# Patient Record
Sex: FEMALE | Race: BLACK OR AFRICAN AMERICAN | Employment: FULL TIME | URBAN - METROPOLITAN AREA
[De-identification: names, ages, dates, MRNs, and addresses within clinical notes are randomized per-mention and may not be internally consistent; named-entity substitution may affect disease eponyms.]

---

## 2017-03-02 DIAGNOSIS — I10 ESSENTIAL HYPERTENSION, BENIGN: ICD-10-CM

## 2017-03-03 RX ORDER — CHLORTHALIDONE 25 MG/1
TABLET ORAL
Qty: 30 TAB | Refills: 0 | Status: SHIPPED | OUTPATIENT
Start: 2017-03-03 | End: 2017-07-06 | Stop reason: SINTOL

## 2017-03-08 DIAGNOSIS — I10 ESSENTIAL HYPERTENSION, BENIGN: ICD-10-CM

## 2017-03-09 RX ORDER — AMLODIPINE BESYLATE 5 MG/1
5 TABLET ORAL DAILY
Qty: 30 TAB | Refills: 0 | Status: SHIPPED | OUTPATIENT
Start: 2017-03-09 | End: 2017-03-22 | Stop reason: DRUGHIGH

## 2017-03-16 LAB — MAMMOGRAPHY, EXTERNAL: NORMAL

## 2017-03-22 ENCOUNTER — OFFICE VISIT (OUTPATIENT)
Dept: FAMILY MEDICINE CLINIC | Age: 42
End: 2017-03-22

## 2017-03-22 VITALS
SYSTOLIC BLOOD PRESSURE: 140 MMHG | HEIGHT: 62 IN | OXYGEN SATURATION: 98 % | TEMPERATURE: 98.6 F | DIASTOLIC BLOOD PRESSURE: 84 MMHG | BODY MASS INDEX: 40.03 KG/M2 | HEART RATE: 98 BPM | RESPIRATION RATE: 20 BRPM | WEIGHT: 217.5 LBS

## 2017-03-22 DIAGNOSIS — G47.09 OTHER INSOMNIA: ICD-10-CM

## 2017-03-22 DIAGNOSIS — J45.20 RAD (REACTIVE AIRWAY DISEASE) WITH WHEEZING, MILD INTERMITTENT, UNCOMPLICATED: ICD-10-CM

## 2017-03-22 DIAGNOSIS — D75.839 THROMBOCYTOSIS: ICD-10-CM

## 2017-03-22 DIAGNOSIS — J20.8 ACUTE BRONCHITIS DUE TO OTHER SPECIFIED ORGANISMS: ICD-10-CM

## 2017-03-22 DIAGNOSIS — I10 ESSENTIAL HYPERTENSION, BENIGN: Primary | ICD-10-CM

## 2017-03-22 DIAGNOSIS — E66.9 OBESITY, CLASS II, BMI 35-39.9: ICD-10-CM

## 2017-03-22 DIAGNOSIS — R05.9 COUGH: ICD-10-CM

## 2017-03-22 DIAGNOSIS — E78.00 PURE HYPERCHOLESTEROLEMIA: ICD-10-CM

## 2017-03-22 DIAGNOSIS — E55.9 VITAMIN D DEFICIENCY: ICD-10-CM

## 2017-03-22 DIAGNOSIS — J30.89 SEASONAL ALLERGIC RHINITIS DUE TO OTHER ALLERGIC TRIGGER: ICD-10-CM

## 2017-03-22 DIAGNOSIS — D72.828 OTHER ELEVATED WHITE BLOOD CELL (WBC) COUNT: ICD-10-CM

## 2017-03-22 DIAGNOSIS — N92.0 MENORRHAGIA WITH REGULAR CYCLE: ICD-10-CM

## 2017-03-22 DIAGNOSIS — D50.8 OTHER IRON DEFICIENCY ANEMIA: ICD-10-CM

## 2017-03-22 PROBLEM — D72.829 LEUKOCYTOSIS: Status: ACTIVE | Noted: 2017-03-22

## 2017-03-22 PROBLEM — J30.2 SEASONAL ALLERGIC RHINITIS: Status: ACTIVE | Noted: 2017-03-22

## 2017-03-22 LAB
QUICKVUE INFLUENZA TEST: NEGATIVE
VALID INTERNAL CONTROL?: YES

## 2017-03-22 RX ORDER — ALBUTEROL SULFATE 90 UG/1
AEROSOL, METERED RESPIRATORY (INHALATION)
Refills: 0 | COMMUNITY
Start: 2016-12-31 | End: 2017-03-22 | Stop reason: SDUPTHER

## 2017-03-22 RX ORDER — MONTELUKAST SODIUM 10 MG/1
10 TABLET ORAL DAILY
Qty: 30 TAB | Refills: 5 | Status: SHIPPED | OUTPATIENT
Start: 2017-03-22 | End: 2017-07-17

## 2017-03-22 RX ORDER — HYDROCODONE POLISTIREX AND CHLORPHENIRAMINE POLISTIREX 10; 8 MG/5ML; MG/5ML
5 SUSPENSION, EXTENDED RELEASE ORAL
Qty: 200 ML | Refills: 0 | Status: SHIPPED | OUTPATIENT
Start: 2017-03-22 | End: 2017-07-06 | Stop reason: ALTCHOICE

## 2017-03-22 RX ORDER — ALBUTEROL SULFATE 90 UG/1
2 AEROSOL, METERED RESPIRATORY (INHALATION)
Qty: 1 INHALER | Refills: 1 | Status: SHIPPED | OUTPATIENT
Start: 2017-03-22 | End: 2018-04-04 | Stop reason: SDUPTHER

## 2017-03-22 RX ORDER — AMLODIPINE BESYLATE 10 MG/1
10 TABLET ORAL DAILY
Qty: 30 TAB | Refills: 5 | Status: SHIPPED | OUTPATIENT
Start: 2017-03-22 | End: 2018-07-23

## 2017-03-22 RX ORDER — LISINOPRIL 40 MG/1
40 TABLET ORAL DAILY
Qty: 30 TAB | Refills: 11 | Status: SHIPPED | OUTPATIENT
Start: 2017-03-22 | End: 2018-04-04 | Stop reason: SDUPTHER

## 2017-03-22 NOTE — PROGRESS NOTES
HISTORY OF PRESENT ILLNESS  Shannon Aldana is a 35 y.o. female presents with Blood Pressure Check; ED Follow-up; Cough; and Medication Refill    Agree with nurse note. I have not seen  since 3/16/16. At that time, she was sent to the ER from our office due to chest pain. No recurrence. Pt reports going to [facility] ER in 12/2016. Dx'd with bronchitis. Rx'd antibiotic, cough syrup, and ProAir HFA. Pt with allergies. She mentions she had flu-like sxs last week. Today, rapid influenza is negative. She has episodes of coughing that causes her not to be able to breathe. Her chest becomes tight and she wheezes with more episodes occurring at night. She uses ProAir during these episodes with relief. Hypertensive pt presents to the office with an elevated BP of 154/94. For BP, she takes Norvasc 5 mg daily and Lisinopril 40 mg daily, tolerating well. She is not taking Chlorthalidone 25 mg. Patient denies vision changes, headaches, dizziness, chest pain, SOB, or swelling. She weighs 217 lbs, gained 4 lbs since last ov. Pt is followed by optometrist, Dr. Tigre Pruitt. Pt with Vit D deficiency, hypercholesterolemia, iron deficiency anemia, elevated WBC, and thrombocytosis. She takes Ferrous Sulfate 250 mg daily. In 03/2016, WBC was 11.1. Hgb was 9.2. Hct was 30.9. Pt with hx of menorrhagia; resolved after D&C in 2015. Health Maintenance    Pt reports her last pap smear was last year with GYN, Dr. Shant Singh. She reports having a mammogram on 3/17/17 at the [facility] with normal results. ROS    Review of Systems negative except as noted above in HPI.     ALLERGIES:    Allergies   Allergen Reactions    Bactrim [Sulfamethoxazole-Trimethoprim] Other (comments)     GI Upset       CURRENT MEDICATIONS:    Outpatient Prescriptions Marked as Taking for the 3/22/17 encounter (Office Visit) with Maria Alejandra Herrmann, DO   Medication Sig Dispense Refill    Ferrous Sulfate 250 mg (50 mg iron) TbER Take  by mouth.  PROAIR HFA 90 mcg/actuation inhaler Take 2 Puffs by inhalation every four (4) hours as needed for Wheezing. Indications: BRONCHOSPASM PREVENTION 1 Inhaler 1    lisinopril (PRINIVIL, ZESTRIL) 40 mg tablet Take 1 Tab by mouth daily. Indications: hypertension 30 Tab 11    amLODIPine (NORVASC) 10 mg tablet Take 1 Tab by mouth daily. Indications: hypertension 30 Tab 5    montelukast (SINGULAIR) 10 mg tablet Take 1 Tab by mouth daily. Indications: ALLERGIC RHINITIS 30 Tab 5    chlorpheniramine-HYDROcodone (TUSSIONEX) 10-8 mg/5 mL suspension Take 5 mL by mouth every twelve (12) hours as needed for Cough. Max Daily Amount: 10 mL. Indications: COUGH 200 mL 0       PAST MEDICAL HISTORY:    Past Medical History:   Diagnosis Date    Allergy, unspecified not elsewhere classified     Anemia     Anemia     Ankle sprain 03/2009    bilateral.  Dr. Linh Coleman Breast mass 5/5/09    Left. benign.  Chickenpox childhood    Ectopic pregnancy 04/2010    Right. Dr. Radha Fernandez hypertension, benign     Menorrhagia 08/2015    Dr. Suma Rod Ovarian cyst 2011    R. Dr. Villagomez  Pure hypercholesterolemia 05/2009    Thrombocytosis ([address] 05/2009    Tubal pregnancy 06/2011    Dr Sarah Sanchez     Vitamin D deficiency 05/2009       PAST SURGICAL HISTORY:    Past Surgical History:   Procedure Laterality Date    HX DILATION AND CURETTAGE  8/11/15    Dr. Ambreen Zavala. due to heavy vag bleeding and thick endometrial stripe.  HX SALPINGO-OOPHORECTOMY Right 04/2010    Right. due to ectopic pregnancy. Dr. Sarah Sanchez.     HX TUBAL LIGATION  2002    Dr. Eunice Archibald HISTORY:    Family History   Problem Relation Age of Onset    Diabetes Mother      type ll    Hypertension Mother     Diabetes Father      type ll       SOCIAL HISTORY:    Social History     Social History    Marital status:      Spouse name: N/A   Zia [place] Number of children: N/A    Years of education: N/A     Social History Main Topics    Smoking status: Never Smoker    Smokeless tobacco: None    Alcohol use No    Drug use: No    Sexual activity: Yes     Partners: Male     Birth control/ protection: None     Other Topics Concern    None     Social History Narrative       IMMUNIZATIONS:    There is no immunization history on file for this patient. PHYSICAL EXAMINATION    Vital Signs    Visit Vitals    BP (!) 154/94 (BP 1 Location: Right arm, BP Patient Position: Sitting)    Pulse 98    Temp 98.6 °F (37 °C) (Oral)    Resp 20    Ht 5' 2\" (1.575 m)    Wt 217 lb 8 oz (98.7 kg)    LMP 03/06/2017 (Approximate)    SpO2 98%    BMI 39.78 kg/m2       Weight Metrics 3/22/2017 3/16/2016 3/16/2016 9/9/2015 8/26/2015 7/21/2015 6/25/2015   Weight 217 lb 8 oz 213 lb 3 oz 213 lb 6.4 oz 180 lb 202 lb 9.6 oz 201 lb 11.2 oz 203 lb   BMI 39.78 kg/m2 38.98 kg/m2 39.02 kg/m2 32.91 kg/m2 37.05 kg/m2 36.88 kg/m2 37.12 kg/m2       General appearance - Well nourished. Well appearing. Well developed. No acute distress. Overweight. Head - Normocephalic. Atraumatic. Eyes - pupils equal and reactive. Extraocular eye movements intact. Sclera anicteric. Mildly injected sclera. Ears - Hearing is grossly normal bilaterally. Nose - normal and patent. No polyps noted. No erythema. No discharge. Mouth - mucous membranes with adequate moisture. Posterior pharynx normal with cobblestone appearance. No erythema, white exudate or obstruction. Neck - supple. Midline trachea. No carotid bruits noted bilaterally. No thyromegaly noted. Chest - clear to auscultation bilaterally anteriorly and posteriorly. No wheezes. No rales or rhonchi. Breath sounds are symmetrical bilaterally. Unlabored respirations. Heart - normal rate. Regular rhythm. Normal S1, S2. No murmur noted. No rubs, clicks or gallops noted. Abdomen - soft and distended.   No masses or organomegaly. No rebound, rigidity or guarding. Bowel sounds normal x 4 quadrants. No tenderness noted. Neurological - awake, alert and oriented to person, place, and time and event. Cranial nerves II through XII intact. Clear speech. Muscle strength is +5/5 x 4 extremities. Sensation is intact to light touch bilaterally. Steady gait. Heme/Lymph - peripheral pulses normal x 4 extremities. No peripheral edema is noted. Musculoskeletal - Intact x 4 extremities. Full ROM x 4 extremities. No pain with movement. Back exam - normal range of motion. No pain on palpation of the spinous processes in the cervical, thoracic, lumbar, sacral regions. No CVA tenderness. Skin - no rashes, erythema, ecchymosis, lacerations, abrasions, suspicious moles noted  Psychological -   normal behavior, dress and thought processes. Good insight. Good eye contact. Normal affect. Appropriate mood. Normal speech. DATA REVIEWED    Results for orders placed or performed in visit on 03/22/17   AMB POC RAPID INFLUENZA TEST   Result Value Ref Range    VALID INTERNAL CONTROL POC Yes     QuickVue Influenza test Negative Negative     Lab Results   Component Value Date/Time    WBC 11.1 03/16/2016 11:38 AM    HGB 9.2 03/16/2016 11:38 AM    HCT 30.9 03/16/2016 11:38 AM    PLATELET 777 55/43/0762 11:38 AM    MCV 71.7 03/16/2016 11:38 AM     Lab Results   Component Value Date/Time    Sodium 140 03/16/2016 11:38 AM    Potassium 3.6 03/16/2016 11:38 AM    Chloride 106 03/16/2016 11:38 AM    CO2 28 03/16/2016 11:38 AM    Anion gap 6 03/16/2016 11:38 AM    Glucose 104 03/16/2016 11:38 AM    BUN 9 03/16/2016 11:38 AM    Creatinine 0.84 03/16/2016 11:38 AM    BUN/Creatinine ratio 11 03/16/2016 11:38 AM    GFR est AA >60 03/16/2016 11:38 AM    GFR est non-AA >60 03/16/2016 11:38 AM    Calcium 8.8 03/16/2016 11:38 AM    Bilirubin, total 0.2 03/16/2016 11:38 AM    AST (SGOT) 10 03/16/2016 11:38 AM    Alk.  phosphatase 101 03/16/2016 11:38 AM    Protein, total 8.2 03/16/2016 11:38 AM    Albumin 3.2 03/16/2016 11:38 AM    Globulin 5.0 03/16/2016 11:38 AM    A-G Ratio 0.6 03/16/2016 11:38 AM    ALT (SGPT) 22 03/16/2016 11:38 AM     Lab Results   Component Value Date/Time    Cholesterol, total 195 08/27/2015 08:56 AM    HDL Cholesterol 76 08/27/2015 08:56 AM    LDL, calculated 106 08/27/2015 08:56 AM    VLDL, calculated 13 08/27/2015 08:56 AM    Triglyceride 66 08/27/2015 08:56 AM     Lab Results   Component Value Date/Time    VITAMIN D, 25-HYDROXY 12.0 08/27/2015 08:56 AM       Lab Results   Component Value Date/Time    Hemoglobin A1c 5.6 08/27/2015 08:56 AM     Lab Results   Component Value Date/Time    TSH 1.290 08/27/2015 08:56 AM       ASSESSMENT and PLAN      ICD-10-CM ICD-9-CM    1. Essential hypertension, benign I10 401.1 lisinopril (PRINIVIL, ZESTRIL) 40 mg tablet      amLODIPine (NORVASC) 10 mg tablet      LIPID PANEL      METABOLIC PANEL, COMPREHENSIVE      TSH 3RD GENERATION      URINALYSIS W/ RFLX MICROSCOPIC      MICROALBUMIN, UR, RAND W/ MICROALBUMIN/CREA RATIO    uncontrolled   2. Seasonal allergic rhinitis due to other allergic trigger J30.89  montelukast (SINGULAIR) 10 mg tablet   3. RAD (reactive airway disease) with wheezing, mild intermittent, uncomplicated J69.80 959.48 PROAIR HFA 90 mcg/actuation inhaler   4. Vitamin D deficiency E55.9 268.9 VITAMIN D, 25 HYDROXY   5. Cough R05 786.2 AMB POC RAPID INFLUENZA TEST      REFERRAL TO ALLERGY      CBC WITH AUTOMATED DIFF   6. Other iron deficiency anemia D50.8  Ferrous Sulfate 250 mg (50 mg iron) TbER   7. Acute bronchitis due to other specified organisms J20.8 466.0     resolved after antibiotic   8. Pure hypercholesterolemia E78.00 272.0 LIPID PANEL      METABOLIC PANEL, COMPREHENSIVE   9. Menorrhagia with regular cycle N92.0 626.2     resolved after D and C   10. Other elevated white blood cell (WBC) count D72.828  CBC WITH AUTOMATED DIFF   11.  Thrombocytosis ([address] D47.3 238.71 CBC WITH AUTOMATED DIFF   12. Obesity, Class II, BMI 35-39.9 ([facility]) E66.01 278.01    13. Other insomnia G47.09 780.52 chlorpheniramine-HYDROcodone (TUSSIONEX) 10-8 mg/5 mL suspension    due to cough         Discussed the patient's BMI with her. The BMI follow up plan is as follows: I have counseled this patient on diet and exercise regimens. Addressed weight, diet and exercise with patient. Decrease carbohydrates (white foods, sweet foods, sweet drinks and alcohol), increase green leafy vegetables and protein (lean meats and beans) with each meal.  Avoid fried foods. Eat 3-5 small meals daily. Do not skip meals. Increase water intake. Increase physical activity to 30 minutes daily for health benefit or 60 minutes daily to prevent weight regain, as tolerated. Get 7-8 hours uninterrupted sleep nightly. Chart reviewed and updated. Continue current medications and care. Increase Norvasc from 5 mg to 10 mg daily. Continue with Lisinopril 40 mg daily; will not restart Chlorthalidone. Start Singulair 10 mg daily for allergies and cough  Prescriptions written and sent to pharmacy; medication side effects discussed. Norvasc 10 mg. Lisinopril 40 mg. Singulair 10 mg. ProAir HFA 90 mcg. Tussionex 10-8 mg/5 mL suspension. Most recent tests reviewed from 03/2016. Recheck pertinent labs when fasting. Get recent office visit notes from [facility], Dr. Jacob Easley, Dr. Marely Hutchins, and [facility]. Advised pt to sign release. Referrals given; patient urged to keep appointments with specialists. Allergist.   Counseled patient on health concerns: Allergies, cough, RAD, and BP. Relevant handouts given and discussed with patient. Immunizations noted. Offered empathy, support, legitimation, prayers, partnership to patient. Praised patient for progress. Advance Care Booklet given at office visit. Discussed with pt today. Referred for honoring choices.  Staff message sent to honoring choices team. Follow-up Disposition:  Return in about 3 months (around 6/22/2017) for BP, RESULTS. Patient was offered a choice/choices in the treatment plan today. Patient expresses understanding of the plan and agrees with recommendations. Written by gilda Irwin, as dictated by Dr. Deshaun Peterson DO. Documentation True and Accepted by Vickie Sparks. Ella Coe. Patient Instructions        DASH Diet: Care Instructions  Your Care Instructions  The DASH diet is an eating plan that can help lower your blood pressure. DASH stands for Dietary Approaches to Stop Hypertension. Hypertension is high blood pressure. The DASH diet focuses on eating foods that are high in calcium, potassium, and magnesium. These nutrients can lower blood pressure. The foods that are highest in these nutrients are fruits, vegetables, low-fat dairy products, nuts, seeds, and legumes. But taking calcium, potassium, and magnesium supplements instead of eating foods that are high in those nutrients does not have the same effect. The DASH diet also includes whole grains, fish, and poultry. The DASH diet is one of several lifestyle changes your doctor may recommend to lower your high blood pressure. Your doctor may also want you to decrease the amount of sodium in your diet. Lowering sodium while following the DASH diet can lower blood pressure even further than just the DASH diet alone. Follow-up care is a key part of your treatment and safety. Be sure to make and go to all appointments, and call your doctor if you are having problems. It's also a good idea to know your test results and keep a list of the medicines you take. How can you care for yourself at home? Following the DASH diet  · Eat 4 to 5 servings of fruit each day. A serving is 1 medium-sized piece of fruit, ½ cup chopped or canned fruit, 1/4 cup dried fruit, or 4 ounces (½ cup) of fruit juice. Choose fruit more often than fruit juice.   · Eat 4 to 5 servings of vegetables each day. A serving is 1 cup of lettuce or raw leafy vegetables, ½ cup of chopped or cooked vegetables, or 4 ounces (½ cup) of vegetable juice. Choose vegetables more often than vegetable juice. · Get 2 to 3 servings of low-fat and fat-free dairy each day. A serving is 8 ounces of milk, 1 cup of yogurt, or 1 ½ ounces of cheese. · Eat 6 to 8 servings of grains each day. A serving is 1 slice of bread, 1 ounce of dry cereal, or ½ cup of cooked rice, pasta, or cooked cereal. Try to choose whole-grain products as much as possible. · Limit lean meat, poultry, and fish to 2 servings each day. A serving is 3 ounces, about the size of a deck of cards. · Eat 4 to 5 servings of nuts, seeds, and legumes (cooked dried beans, lentils, and split peas) each week. A serving is 1/3 cup of nuts, 2 tablespoons of seeds, or ½ cup of cooked beans or peas. · Limit fats and oils to 2 to 3 servings each day. A serving is 1 teaspoon of vegetable oil or 2 tablespoons of salad dressing. · Limit sweets and added sugars to 5 servings or less a week. A serving is 1 tablespoon jelly or jam, ½ cup sorbet, or 1 cup of lemonade. · Eat less than 2,300 milligrams (mg) of sodium a day. If you limit your sodium to 1,500 mg a day, you can lower your blood pressure even more. Tips for success  · Start small. Do not try to make dramatic changes to your diet all at once. You might feel that you are missing out on your favorite foods and then be more likely to not follow the plan. Make small changes, and stick with them. Once those changes become habit, add a few more changes. · Try some of the following:  ¨ Make it a goal to eat a fruit or vegetable at every meal and at snacks. This will make it easy to get the recommended amount of fruits and vegetables each day. ¨ Try yogurt topped with fruit and nuts for a snack or healthy dessert. ¨ Add lettuce, tomato, cucumber, and onion to sandwiches.   ¨ Combine a ready-made pizza crust with low-fat mozzarella cheese and lots of vegetable toppings. Try using tomatoes, squash, spinach, broccoli, carrots, cauliflower, and onions. ¨ Have a variety of cut-up vegetables with a low-fat dip as an appetizer instead of chips and dip. ¨ Sprinkle sunflower seeds or chopped almonds over salads. Or try adding chopped walnuts or almonds to cooked vegetables. ¨ Try some vegetarian meals using beans and peas. Add garbanzo or kidney beans to salads. Make burritos and tacos with mashed glynn beans or black beans. Where can you learn more? Go to http://tlCogent [employer] Groupaguilar.info/. Enter C965 in the search box to learn more about \"DASH Diet: Care Instructions. \"  Current as of: March 23, 2016  Content Version: 11.1  © 5239-5876 [employer]. Care instructions adapted under license by [employer] (which disclaims liability or warranty for this information). If you have questions about a medical condition or this instruction, always ask your healthcare professional. [place] [address] any warranty or liability for your use of this information. Check BP twice weekly. Notify me if it consistently is above 140/90 or below 90/60. Bring your blood pressure log to your next office visit. Decrease salt, fats, caffeine, alcohol in your diet. Increase water, fiber. Exercise 5 times weekly for 30 minutes daily as tolerated. Continue current medications, care and subspecialty follow up. Visit eye doctor yearly to check your eyes blood pressure related changes. WEIGHT LOSS PLAN    1. Read food labels and count calories. Goal 7865-2019 calories daily for women and 7770-2426 calories daily for men. Achieve this by decreasing caloric intake by 500 calories by weekly increments. NOTE:  1 pound = 3500 calories! Www.loseit. com  Www.myOncodesignnesspal.com  Www.[employer]. ZummZumm  Www.healthfinder. gov  Weight watchers mobile    Calories needed to lose 1-2 pounds a week = 10 x your weight in pounds    2. Increase water intake. Per [facility] Weight loss Program, it is important to drink 1/2 your body weight in ounces of water daily. Decrease your water consumption 2-3 hours before bedtime to prevent sleep disturbance from frequent urination. 3.  Decrease sugary beverages. Each can or glass of soda increases your risk of obesity by 60%. Can lose 10 pounds in a month by avoiding any soda. 12 oz can of soda = 140 calories, 16 oz cup of sweet tea = 200 calories    16 oz orange juice = 200 calories, 10 oz apple juice = 150 calories   32 oz sports drink = 200 calories, 16 oz punch = 240 calories   3.5 oz alcohol = 100-150 calories     4. Avoid High Fructose Corn Syrup products. This ingredient makes products highly addictive! 5. Exercise 30 minutes daily 5 days weekly, minimally. If you burn 3500 calories that equals a pound! Use a pedometer to count steps. Visit www. [employer] for a free pedometer and diet recommendations. Your maximum heart rate = 220 - your age    Never exercise at your maximum heart rate. See handout for target heart rate. 6.  Decrease carbohydrates (white bread, pasta, rice, potatoes, sweet foods and sweet drinks like soda, tea, coffee, juice and sports drinks). Increase fiber and protein. Goal:   calories daily of carbohydrates     Try CHROMIUM PICONATE 200 MG THREE TIMES DAILY,    to decrease Premenstrual carbohydrate cravings. 7.  Eat 3-5 small meals daily, include lots of protein (beans/legumes, nuts, lean meat, eggs) and green vegetables with each. (Breakfast, lunch, dinner with 2 healthy snacks)    8. Get proper rest 7-8 hours uninterrupted. When you get less than 6 hours, it triggers hunger by affecting your [place]:Leptin ratio and this results in weight gain. 9.  Watch your food portions.   Green leafy vegetables should cover 1/2 of the plate, lean meat 1/4 of the plate, starchy vegetable 1/4 of the plate.  Use smaller plates. 10.  Do not eat until you are full. Eat until you are no longer hungry. If you are not sure, try drinking a glass of water before getting your second serving of food. 11.  Do not weigh yourself daily. Wait until your next office visit. Use how you feel and  how your clothes fit as measurements of success. 12.  Address your spirituality to draw strength from above during your journey. Remember \"I am fearfully and wonderfully made. Marvelous in His eyes. \"    13. Set realistic, appropriate and achievable weight loss goals:     RECOMMENDED TARGET WEIGHT LOSS:  Initial weight loss of 5-10% of your initial body weight achieved over 6 months or a decrease of 2 BMI units. MINIMUM GOAL OF WEIGHT LOSS:  Reduce body weight and maintain a lower body weight. Prevent weight gain. RELATED WEBSITES:      Www.obesityaction. org  (and consider joining the Obesity Action Coalition for $25/year. The [facility] mission is to elevated and empower those affected by obesity through education, advocacy and support. Quarterly journals included in membership fee. )    Www.Docstoc  www.GetAFive.com     RELATED DIETS:  Dr. Jamie Vazquez Weight loss challenge, Mediterranean diet, [address] [place] [address] Watchers, Paleo Diet (anti-inflammatory diet)        EXERCISE 150 MINUTES WEEKLY. THIS CAN BE ACHIEVED BY WORKING OUT OR WALKING A MINIMUM OF 30 MINUTES FOR 5 DAYS WEEKLY. YOU CAN EXERCISE IN INCREMENTS OF 10-15 MINUTES UP TO 3 TIMES A DAY. Consider performing \"brainless exercise. \"  Choose your favorite tv program.  Five minutes before and for 5 minutes after the tv program, stretch your body. While the program is on, walk in place watching the show. When commercials come on, rest or walk around the house to do other things. When the program begins, return to walking in place.   When you are able keep walking during the commercials and add light weights to your ankles or hands. By the end of the show, you would have walked 30 minutes. If you need shorter spurts of exercise, walk during the commercials and rest during the show. Drink to glasses of water prior to any exercise to prevent dehydration and to improve the results of the work out. Your RESTING HEART RATE is the number of times your heart beats per minute when you are not exerting yourself. The more fit you are, the lower your resting heart rate will be. Your MAXIMUM HEART RATE is the number of times per minute your heart pumps when it is working at 100% capacity. NEVER EXERCISE AT YOUR MAXIMUM HEART RATE. MAX HEART RATE = 220 - your age    For example, in a 48year old, the maximum heart rate is 220-50 = 170 beats per minute    Your [place] is the number of beats per minute our heart should pump during aerobic exercise. Reaching your target heart rate indicates that your body is receiving maximum cardiovascular and fat burning benefits. If you are fit, your TARGET HEART RATE = 70-85% of your maximum Heart rate      For a 48year old with vigorous intensity physical activity,         Target heart rate= 170 bpm x .70 = 119 bpm     Target heart rate = 170 bpm x .85=  145 bpm        Therefore, your target heart rate during physical activity is 119-145      If you are not fit, TARGET HEART RATE = 50-70% of your maximum Heart rate    MODERATE CONSISTENT AEROBIC EXERCISE OR WALKING FOR AT LEAST 150 MINUTES WEEKLY IS AN ESSENTIAL PART OF ANY WEIGHT LOSS OF WEIGHT MAINTENANCE PROGRAM.     During WEIGHT LOSS PHASE, at least 75% of your exercise needs to be walking or moderate aerobic activity and 25% or 0% can be Isometric or Resistance type exercises (the latter can be deferred to the weight maintenance phase.)     During WEIGHT MAINTENANCE PHASE, at least 50% of your exercise needs to be aerobic and 50% Isometric or Resistance type exercises.      BENEFITS OF MODERATE INTENSITY EXERCISE MOST DAYS OF THE WEEK:     1. Insulin Resistance improves 30-85%   2. Abdominal Fat decreases by 30%   3. Inflammatory Markers decrease by 30% (therefore pain decreases)   4. Systolic and Diastolic Blood Pressure decrease by 4 mmHg   5. HDL improves by 5%   6. Triglycerides decrease by 15%   7. Shift from small dense LDL to large dense LDL (therefore decrease Insulin Resistance)   8. Decrease coagulability                  DASH Diet: Care Instructions  Your Care Instructions  The DASH diet is an eating plan that can help lower your blood pressure. DASH stands for Dietary Approaches to Stop Hypertension. Hypertension is high blood pressure. The DASH diet focuses on eating foods that are high in calcium, potassium, and magnesium. These nutrients can lower blood pressure. The foods that are highest in these nutrients are fruits, vegetables, low-fat dairy products, nuts, seeds, and legumes. But taking calcium, potassium, and magnesium supplements instead of eating foods that are high in those nutrients does not have the same effect. The DASH diet also includes whole grains, fish, and poultry. The DASH diet is one of several lifestyle changes your doctor may recommend to lower your high blood pressure. Your doctor may also want you to decrease the amount of sodium in your diet. Lowering sodium while following the DASH diet can lower blood pressure even further than just the DASH diet alone. Follow-up care is a key part of your treatment and safety. Be sure to make and go to all appointments, and call your doctor if you are having problems. It's also a good idea to know your test results and keep a list of the medicines you take. How can you care for yourself at home? Following the DASH diet  · Eat 4 to 5 servings of fruit each day. A serving is 1 medium-sized piece of fruit, ½ cup chopped or canned fruit, 1/4 cup dried fruit, or 4 ounces (½ cup) of fruit juice.  Choose fruit more often than fruit juice.  · Eat 4 to 5 servings of vegetables each day. A serving is 1 cup of lettuce or raw leafy vegetables, ½ cup of chopped or cooked vegetables, or 4 ounces (½ cup) of vegetable juice. Choose vegetables more often than vegetable juice. · Get 2 to 3 servings of low-fat and fat-free dairy each day. A serving is 8 ounces of milk, 1 cup of yogurt, or 1 ½ ounces of cheese. · Eat 6 to 8 servings of grains each day. A serving is 1 slice of bread, 1 ounce of dry cereal, or ½ cup of cooked rice, pasta, or cooked cereal. Try to choose whole-grain products as much as possible. · Limit lean meat, poultry, and fish to 2 servings each day. A serving is 3 ounces, about the size of a deck of cards. · Eat 4 to 5 servings of nuts, seeds, and legumes (cooked dried beans, lentils, and split peas) each week. A serving is 1/3 cup of nuts, 2 tablespoons of seeds, or ½ cup of cooked beans or peas. · Limit fats and oils to 2 to 3 servings each day. A serving is 1 teaspoon of vegetable oil or 2 tablespoons of salad dressing. · Limit sweets and added sugars to 5 servings or less a week. A serving is 1 tablespoon jelly or jam, ½ cup sorbet, or 1 cup of lemonade. · Eat less than 2,300 milligrams (mg) of sodium a day. If you limit your sodium to 1,500 mg a day, you can lower your blood pressure even more. Tips for success  · Start small. Do not try to make dramatic changes to your diet all at once. You might feel that you are missing out on your favorite foods and then be more likely to not follow the plan. Make small changes, and stick with them. Once those changes become habit, add a few more changes. · Try some of the following:  ¨ Make it a goal to eat a fruit or vegetable at every meal and at snacks. This will make it easy to get the recommended amount of fruits and vegetables each day. ¨ Try yogurt topped with fruit and nuts for a snack or healthy dessert. ¨ Add lettuce, tomato, cucumber, and onion to sandwiches.   ¨ Combine a ready-made pizza crust with low-fat mozzarella cheese and lots of vegetable toppings. Try using tomatoes, squash, spinach, broccoli, carrots, cauliflower, and onions. ¨ Have a variety of cut-up vegetables with a low-fat dip as an appetizer instead of chips and dip. ¨ Sprinkle sunflower seeds or chopped almonds over salads. Or try adding chopped walnuts or almonds to cooked vegetables. ¨ Try some vegetarian meals using beans and peas. Add garbanzo or kidney beans to salads. Make burritos and tacos with mashed glynn beans or black beans. Where can you learn more? Go to http://tl-aguilar.info/. Enter W200 in the search box to learn more about \"DASH Diet: Care Instructions. \"  Current as of: March 23, 2016  Content Version: 11.1  © 1769-3038 [employer], [employer]. Care instructions adapted under license by [employer] (which disclaims liability or warranty for this information). If you have questions about a medical condition or this instruction, always ask your healthcare professional. [address] any warranty or liability for your use of this information.

## 2017-03-22 NOTE — PROGRESS NOTES
Chief Complaint   Patient presents with   [address] Follow Up     [facility] 3- note in encounters; per PT she stayed overnight     ED Follow-up     [facility]; Dx with bronchitis    Cough     still has cough from around ngozi time when she went to [facility]      1. Have you been to the ER, urgent care clinic since your last visit? Hospitalized since your last visit? Yes, went [facility] after last ov with Dr. Namrata Kwok per Dr. Namrata Kwok for HTN and chest pain; went to [facility] for Dx of Bronchitis. 2. Have you seen or consulted any other health care providers outside of the [address] [place] [address] since your last visit? Include any pap smears or colon screening. Yes, PT had mammogram done at [facility]; [facility] ED    In the event something were to happen to you and you were unable to speak on your behalf, do you have an Advance Directive/ Living Will in place stating your wishes? NO    If yes, do we have a copy on file NO    If no, would you like information:    PT offered and declined. PT stated that she was diagnosed with Bronchitis back on 12/31/2016 sp going to [facility] ED; stated that she thinks it's asthma because she is still coughing a lot even after taking all the medications she was given; still uses inhaler she received from visit. PT stated that she has not yet had a pap smear done. PT denies any fever, chills, body aches; stated that she did have a sore throat and an ear infection last week but \"I'm over that now. \"

## 2017-03-22 NOTE — PATIENT INSTRUCTIONS
DASH Diet: Care Instructions  Your Care Instructions  The DASH diet is an eating plan that can help lower your blood pressure. DASH stands for Dietary Approaches to Stop Hypertension. Hypertension is high blood pressure. The DASH diet focuses on eating foods that are high in calcium, potassium, and magnesium. These nutrients can lower blood pressure. The foods that are highest in these nutrients are fruits, vegetables, low-fat dairy products, nuts, seeds, and legumes. But taking calcium, potassium, and magnesium supplements instead of eating foods that are high in those nutrients does not have the same effect. The DASH diet also includes whole grains, fish, and poultry. The DASH diet is one of several lifestyle changes your doctor may recommend to lower your high blood pressure. Your doctor may also want you to decrease the amount of sodium in your diet. Lowering sodium while following the DASH diet can lower blood pressure even further than just the DASH diet alone. Follow-up care is a key part of your treatment and safety. Be sure to make and go to all appointments, and call your doctor if you are having problems. It's also a good idea to know your test results and keep a list of the medicines you take. How can you care for yourself at home? Following the DASH diet  · Eat 4 to 5 servings of fruit each day. A serving is 1 medium-sized piece of fruit, ½ cup chopped or canned fruit, 1/4 cup dried fruit, or 4 ounces (½ cup) of fruit juice. Choose fruit more often than fruit juice. · Eat 4 to 5 servings of vegetables each day. A serving is 1 cup of lettuce or raw leafy vegetables, ½ cup of chopped or cooked vegetables, or 4 ounces (½ cup) of vegetable juice. Choose vegetables more often than vegetable juice. · Get 2 to 3 servings of low-fat and fat-free dairy each day. A serving is 8 ounces of milk, 1 cup of yogurt, or 1 ½ ounces of cheese. · Eat 6 to 8 servings of grains each day.  A serving is 1 slice of bread, 1 ounce of dry cereal, or ½ cup of cooked rice, pasta, or cooked cereal. Try to choose whole-grain products as much as possible. · Limit lean meat, poultry, and fish to 2 servings each day. A serving is 3 ounces, about the size of a deck of cards. · Eat 4 to 5 servings of nuts, seeds, and legumes (cooked dried beans, lentils, and split peas) each week. A serving is 1/3 cup of nuts, 2 tablespoons of seeds, or ½ cup of cooked beans or peas. · Limit fats and oils to 2 to 3 servings each day. A serving is 1 teaspoon of vegetable oil or 2 tablespoons of salad dressing. · Limit sweets and added sugars to 5 servings or less a week. A serving is 1 tablespoon jelly or jam, ½ cup sorbet, or 1 cup of lemonade. · Eat less than 2,300 milligrams (mg) of sodium a day. If you limit your sodium to 1,500 mg a day, you can lower your blood pressure even more. Tips for success  · Start small. Do not try to make dramatic changes to your diet all at once. You might feel that you are missing out on your favorite foods and then be more likely to not follow the plan. Make small changes, and stick with them. Once those changes become habit, add a few more changes. · Try some of the following:  ¨ Make it a goal to eat a fruit or vegetable at every meal and at snacks. This will make it easy to get the recommended amount of fruits and vegetables each day. ¨ Try yogurt topped with fruit and nuts for a snack or healthy dessert. ¨ Add lettuce, tomato, cucumber, and onion to sandwiches. ¨ Combine a ready-made pizza crust with low-fat mozzarella cheese and lots of vegetable toppings. Try using tomatoes, squash, spinach, broccoli, carrots, cauliflower, and onions. ¨ Have a variety of cut-up vegetables with a low-fat dip as an appetizer instead of chips and dip. ¨ Sprinkle sunflower seeds or chopped almonds over salads. Or try adding chopped walnuts or almonds to cooked vegetables. ¨ Try some vegetarian meals using beans and peas. Add garbanzo or kidney beans to salads. Make burritos and tacos with mashed glynn beans or black beans. Where can you learn more? Go to http://tl-aguilar.info/. Enter D837 in the search box to learn more about \"DASH Diet: Care Instructions. \"  Current as of: March 23, 2016  Content Version: 11.1  © 3276-8376 [employer]. Care instructions adapted under license by [employer] (which disclaims liability or warranty for this information). If you have questions about a medical condition or this instruction, always ask your healthcare professional. [place] [address] any warranty or liability for your use of this information. Check BP twice weekly. Notify me if it consistently is above 140/90 or below 90/60. Bring your blood pressure log to your next office visit. Decrease salt, fats, caffeine, alcohol in your diet. Increase water, fiber. Exercise 5 times weekly for 30 minutes daily as tolerated. Continue current medications, care and subspecialty follow up. Visit eye doctor yearly to check your eyes blood pressure related changes. WEIGHT LOSS PLAN    1. Read food labels and count calories. Goal 1572-5184 calories daily for women and 9111-7332 calories daily for men. Achieve this by decreasing caloric intake by 500 calories by weekly increments. NOTE:  1 pound = 3500 calories! Www.loseit. com  Www.myfitnesspal.com  Www.Pellianoople. [employer]xSYS  Www.healthfinder. gov  Weight watchers mobile    Calories needed to lose 1-2 pounds a week = 10 x your weight in pounds    2. Increase water intake. Per [employer] [facility] loss Program, it is important to drink 1/2 your body weight in ounces of water daily. Decrease your water consumption 2-3 hours before bedtime to prevent sleep disturbance from frequent urination. 3.  Decrease sugary beverages. Each can or glass of soda increases your risk of obesity by 60%.   Can lose 10 pounds in a month by avoiding any soda. 12 oz can of soda = 140 calories, 16 oz cup of sweet tea = 200 calories    16 oz orange juice = 200 calories, 10 oz apple juice = 150 calories   32 oz sports drink = 200 calories, 16 oz punch = 240 calories   3.5 oz alcohol = 100-150 calories     4. Avoid High Fructose Corn Syrup products. This ingredient makes products highly addictive! 5. Exercise 30 minutes daily 5 days weekly, minimally. If you burn 3500 calories that equals a pound! Use a pedometer to count steps. Visit www. [employer] for a free pedometer and diet recommendations. Your maximum heart rate = 220 - your age    Never exercise at your maximum heart rate. See handout for target heart rate. 6.  Decrease carbohydrates (white bread, pasta, rice, potatoes, sweet foods and sweet drinks like soda, tea, coffee, juice and sports drinks). Increase fiber and protein. Goal:   calories daily of carbohydrates     Try CHROMIUM PICONATE 200 MG THREE TIMES DAILY,    to decrease Premenstrual carbohydrate cravings. 7.  Eat 3-5 small meals daily, include lots of protein (beans/legumes, nuts, lean meat, eggs) and green vegetables with each. (Breakfast, lunch, dinner with 2 healthy snacks)    8. Get proper rest 7-8 hours uninterrupted. When you get less than 6 hours, it triggers hunger by affecting your [place]hlin:Leptin ratio and this results in weight gain. 9.  Watch your food portions. Green leafy vegetables should cover 1/2 of the plate, lean meat 1/4 of the plate, starchy vegetable 1/4 of the plate. Use smaller plates. 10.  Do not eat until you are full. Eat until you are no longer hungry. If you are not sure, try drinking a glass of water before getting your second serving of food. 11.  Do not weigh yourself daily. Wait until your next office visit. Use how you feel and  how your clothes fit as measurements of success.     12.  Address your spirituality to draw strength from above during your journey. Remember \"I am fearfully and wonderfully made. Marvelous in His eyes. \"    13. Set realistic, appropriate and achievable weight loss goals:     RECOMMENDED TARGET WEIGHT LOSS:  Initial weight loss of 5-10% of your initial body weight achieved over 6 months or a decrease of 2 BMI units. MINIMUM GOAL OF WEIGHT LOSS:  Reduce body weight and maintain a lower body weight. Prevent weight gain. RELATED WEBSITES:      Www.obesityaction. org  (and consider joining the Obesity Action Coalition for $25/year. The [facility] mission is to elevated and empower those affected by obesity through education, advocacy and support. Quarterly journals included in membership fee. )    Www.Topmission  www.Queryly.com     RELATED DIETS:  Dr. Debra Lopez Weight loss challenge, Mediterranean diet, [address] [place] [address] Watchers, Paleo Diet (anti-inflammatory diet)        EXERCISE 150 MINUTES WEEKLY. THIS CAN BE ACHIEVED BY WORKING OUT OR WALKING A MINIMUM OF 30 MINUTES FOR 5 DAYS WEEKLY. YOU CAN EXERCISE IN INCREMENTS OF 10-15 MINUTES UP TO 3 TIMES A DAY. Consider performing \"brainless exercise. \"  Choose your favorite tv program.  Five minutes before and for 5 minutes after the tv program, stretch your body. While the program is on, walk in place watching the show. When commercials come on, rest or walk around the house to do other things. When the program begins, return to walking in place. When you are able keep walking during the commercials and add light weights to your ankles or hands. By the end of the show, you would have walked 30 minutes. If you need shorter spurts of exercise, walk during the commercials and rest during the show. Drink to glasses of water prior to any exercise to prevent dehydration and to improve the results of the work out. Your RESTING HEART RATE is the number of times your heart beats per minute when you are not exerting yourself.   The more fit you are, the lower your resting heart rate will be. Your MAXIMUM HEART RATE is the number of times per minute your heart pumps when it is working at 100% capacity. NEVER EXERCISE AT YOUR MAXIMUM HEART RATE. MAX HEART RATE = 220 - your age    For example, in a 48year old, the maximum heart rate is 220-50 = 170 beats per minute    Your [place] is the number of beats per minute our heart should pump during aerobic exercise. Reaching your target heart rate indicates that your body is receiving maximum cardiovascular and fat burning benefits. If you are fit, your TARGET HEART RATE = 70-85% of your maximum Heart rate      For a 48year old with vigorous intensity physical activity,         Target heart rate= 170 bpm x .70 = 119 bpm     Target heart rate = 170 bpm x .85=  145 bpm        Therefore, your target heart rate during physical activity is 119-145      If you are not fit, TARGET HEART RATE = 50-70% of your maximum Heart rate    MODERATE CONSISTENT AEROBIC EXERCISE OR WALKING FOR AT LEAST 150 MINUTES WEEKLY IS AN ESSENTIAL PART OF ANY WEIGHT LOSS OF WEIGHT MAINTENANCE PROGRAM.     During WEIGHT LOSS PHASE, at least 75% of your exercise needs to be walking or moderate aerobic activity and 25% or 0% can be Isometric or Resistance type exercises (the latter can be deferred to the weight maintenance phase.)     During WEIGHT MAINTENANCE PHASE, at least 50% of your exercise needs to be aerobic and 50% Isometric or Resistance type exercises. BENEFITS OF MODERATE INTENSITY EXERCISE MOST DAYS OF THE WEEK:     1. Insulin Resistance improves 30-85%   2. Abdominal Fat decreases by 30%   3. Inflammatory Markers decrease by 30% (therefore pain decreases)   4. Systolic and Diastolic Blood Pressure decrease by 4 mmHg   5. HDL improves by 5%   6. Triglycerides decrease by 15%   7. Shift from small dense LDL to large dense LDL (therefore decrease Insulin Resistance)   8.   Decrease coagulability                  DASH Diet: Care Instructions  Your Care Instructions  The DASH diet is an eating plan that can help lower your blood pressure. DASH stands for Dietary Approaches to Stop Hypertension. Hypertension is high blood pressure. The DASH diet focuses on eating foods that are high in calcium, potassium, and magnesium. These nutrients can lower blood pressure. The foods that are highest in these nutrients are fruits, vegetables, low-fat dairy products, nuts, seeds, and legumes. But taking calcium, potassium, and magnesium supplements instead of eating foods that are high in those nutrients does not have the same effect. The DASH diet also includes whole grains, fish, and poultry. The DASH diet is one of several lifestyle changes your doctor may recommend to lower your high blood pressure. Your doctor may also want you to decrease the amount of sodium in your diet. Lowering sodium while following the DASH diet can lower blood pressure even further than just the DASH diet alone. Follow-up care is a key part of your treatment and safety. Be sure to make and go to all appointments, and call your doctor if you are having problems. It's also a good idea to know your test results and keep a list of the medicines you take. How can you care for yourself at home? Following the DASH diet  · Eat 4 to 5 servings of fruit each day. A serving is 1 medium-sized piece of fruit, ½ cup chopped or canned fruit, 1/4 cup dried fruit, or 4 ounces (½ cup) of fruit juice. Choose fruit more often than fruit juice. · Eat 4 to 5 servings of vegetables each day. A serving is 1 cup of lettuce or raw leafy vegetables, ½ cup of chopped or cooked vegetables, or 4 ounces (½ cup) of vegetable juice. Choose vegetables more often than vegetable juice. · Get 2 to 3 servings of low-fat and fat-free dairy each day. A serving is 8 ounces of milk, 1 cup of yogurt, or 1 ½ ounces of cheese.   · Eat 6 to 8 servings of grains each day. A serving is 1 slice of bread, 1 ounce of dry cereal, or ½ cup of cooked rice, pasta, or cooked cereal. Try to choose whole-grain products as much as possible. · Limit lean meat, poultry, and fish to 2 servings each day. A serving is 3 ounces, about the size of a deck of cards. · Eat 4 to 5 servings of nuts, seeds, and legumes (cooked dried beans, lentils, and split peas) each week. A serving is 1/3 cup of nuts, 2 tablespoons of seeds, or ½ cup of cooked beans or peas. · Limit fats and oils to 2 to 3 servings each day. A serving is 1 teaspoon of vegetable oil or 2 tablespoons of salad dressing. · Limit sweets and added sugars to 5 servings or less a week. A serving is 1 tablespoon jelly or jam, ½ cup sorbet, or 1 cup of lemonade. · Eat less than 2,300 milligrams (mg) of sodium a day. If you limit your sodium to 1,500 mg a day, you can lower your blood pressure even more. Tips for success  · Start small. Do not try to make dramatic changes to your diet all at once. You might feel that you are missing out on your favorite foods and then be more likely to not follow the plan. Make small changes, and stick with them. Once those changes become habit, add a few more changes. · Try some of the following:  ¨ Make it a goal to eat a fruit or vegetable at every meal and at snacks. This will make it easy to get the recommended amount of fruits and vegetables each day. ¨ Try yogurt topped with fruit and nuts for a snack or healthy dessert. ¨ Add lettuce, tomato, cucumber, and onion to sandwiches. ¨ Combine a ready-made pizza crust with low-fat mozzarella cheese and lots of vegetable toppings. Try using tomatoes, squash, spinach, broccoli, carrots, cauliflower, and onions. ¨ Have a variety of cut-up vegetables with a low-fat dip as an appetizer instead of chips and dip. ¨ Sprinkle sunflower seeds or chopped almonds over salads. Or try adding chopped walnuts or almonds to cooked vegetables.   ¨ Try some vegetarian meals using beans and peas. Add garbanzo or kidney beans to salads. Make burritos and tacos with mashed glynn beans or black beans. Where can you learn more? Go to http://tl-aguilar.info/. Enter M208 in the search box to learn more about \"DASH Diet: Care Instructions. \"  Current as of: March 23, 2016  Content Version: 11.1  © 0974-6353 [employer]. Care instructions adapted under license by [employer] (which disclaims liability or warranty for this information). If you have questions about a medical condition or this instruction, always ask your healthcare professional. [place] [address] any warranty or liability for your use of this information.

## 2017-03-22 NOTE — MR AVS SNAPSHOT
Visit Information Date & Time Provider Department Dept. Phone Encounter #  
 3/22/2017  2:00 PM [facility] DO [facility] [phone] [id number] Follow-up Instructions Return in about 3 months (around 6/22/2017) for BP, RESULTS. Routing History Upcoming Health Maintenance Date Due  
 PAP AKA CERVICAL CYTOLOGY 3/22/2020 DTaP/Tdap/Td series (2 - Td) 3/22/2027 Allergies as of 3/22/2017  Review Complete On: 3/22/2017 By: Augusta Guillaume Severity Noted Reaction Type Reactions Bactrim [Sulfamethoxazole-trimethoprim]  04/12/2010    Other (comments) GI Upset Current Immunizations  Reviewed on 3/22/2017 No immunizations on file. Reviewed by Karuna Baird DO on 3/22/2017 at  3:18 PM  
You Were Diagnosed With   
  
 Codes Comments Essential hypertension, benign    -  Primary ICD-10-CM: I10 
ICD-9-CM: 401.1 uncontrolled Seasonal allergic rhinitis due to other allergic trigger     ICD-10-CM: J30.89 RAD (reactive airway disease) with wheezing, mild intermittent, uncomplicated     DQN-45-MS: J45.20 ICD-9-CM: 493.90 Vitamin D deficiency     ICD-10-CM: E55.9 ICD-9-CM: 268.9 Cough     ICD-10-CM: R05 ICD-9-CM: 786.2 Other iron deficiency anemia     ICD-10-CM: D50.8 Acute bronchitis due to other specified organisms     ICD-10-CM: J20.8 ICD-9-CM: 466.0 resolved after antibiotic Pure hypercholesterolemia     ICD-10-CM: E78.00 ICD-9-CM: 272.0 Menorrhagia with regular cycle     ICD-10-CM: N92.0 ICD-9-CM: 626.2 resolved after D and C Other elevated white blood cell (WBC) count     ICD-10-CM: B20.734 Thrombocytosis ([address]     ICD-10-CM: D47.3 ICD-9-CM: 238.71 Obesity, Class II, BMI 35-39.9 (HCC)     ICD-10-CM: E66.01 
ICD-9-CM: 278.01 Vitals BP Pulse Temp Resp Height(growth percentile) Weight(growth percentile)  (!) 154/94 (BP 1 Location: Right arm, BP Patient Position: Sitting) 98 98.6 °F (37 °C) (Oral) 20 5' 2\" (1.575 m) 217 lb 8 oz (98.7 kg) LMP SpO2 BMI OB Status Smoking Status 03/06/2017 (Approximate) 98% 39.78 kg/m2 Having regular periods Never Smoker BMI and BSA Data Body Mass Index Body Surface Area 39.78 kg/m 2 2.08 m 2 Preferred Pharmacy Pharmacy Name Phone [facility] AT [facility] [facility] [address] [phone] Your Updated Medication List  
  
   
This list is accurate as of: 3/22/17  3:19 PM.  Always use your most recent med list. amLODIPine 10 mg tablet Commonly known as:  Brenden Chafe Take 1 Tab by mouth daily. Indications: hypertension  
  
 chlorthalidone 25 mg tablet Commonly known as:  HYGROTEN  
TAKE 1 TABLET BY MOUTH DAILY FOR HIGH BLOOD PRESSURE Ferrous Sulfate 250 mg (50 mg iron) Tber Take  by mouth. HYDROcodone-acetaminophen 5-325 mg per tablet Commonly known as:  Abida Chris Take 1 Tab by mouth every four (4) hours as needed for Pain. Max Daily Amount: 6 Tabs. lisinopril 40 mg tablet Commonly known as:  Manisha Lester Take 1 Tab by mouth daily. Indications: hypertension  
  
 montelukast 10 mg tablet Commonly known as:  SINGULAIR Take 1 Tab by mouth daily. Indications: ALLERGIC RHINITIS  
  
 PROAIR HFA 90 mcg/actuation inhaler Generic drug:  albuterol Take 2 Puffs by inhalation every four (4) hours as needed for Wheezing. Indications: BRONCHOSPASM PREVENTION Prescriptions Sent to Pharmacy Refills PROAIR HFA 90 mcg/actuation inhaler 1 Sig: Take 2 Puffs by inhalation every four (4) hours as needed for Wheezing. Indications: BRONCHOSPASM PREVENTION Class: Normal  
 Pharmacy: [facility] [address] AT [facility] [facility] [address] P Ph #: [phone]  Route: Inhalation  
 lisinopril (PRINIVIL, ZESTRIL) 40 mg tablet 11  
 Sig: Take 1 Tab by mouth daily. Indications: hypertension Class: Normal  
 Pharmacy: [facility] [address] AT [address] [place] [address] Drive P Ph #: [phone] Route: Oral  
 amLODIPine (NORVASC) 10 mg tablet 5 Sig: Take 1 Tab by mouth daily. Indications: hypertension Class: Normal  
 Pharmacy: [facility] [address] AT [address] [place] [address] P Ph #: [phone] Route: Oral  
 montelukast (SINGULAIR) 10 mg tablet 5 Sig: Take 1 Tab by mouth daily. Indications: ALLERGIC RHINITIS Class: Normal  
 Pharmacy: [facility] [address] AT [address] [place] [address] P Ph #: [phone] Route: Oral  
  
We Performed the Following AMB POC RAPID INFLUENZA TEST [31255 CPT(R)] CBC WITH AUTOMATED DIFF [77499 CPT(R)] LIPID PANEL [03979 CPT(R)] METABOLIC PANEL, COMPREHENSIVE [31635 CPT(R)] MICROALBUMIN, UR, RAND W/ MICROALBUMIN/CREA RATIO [id number] CPT(R)] REFERRAL TO ALLERGY [REF5 Custom] Comments:  
 Please evaluate patient for chronic cough, allergies, ? Asthma TSH 3RD GENERATION [51618 CPT(R)] URINALYSIS W/ RFLX MICROSCOPIC [51801 CPT(R)] VITAMIN D, 25 HYDROXY [id number] CPT(R)] Follow-up Instructions Return in about 3 months (around 6/22/2017) for BP, RESULTS. Referral Information Referral ID Referred By Referred To  
  
 [id number] Sayra Blas MD   
   [address] [place] [address] Phone: [phone] Fax: [phone] Visits Status Start Date End Date 1 New Request 3/22/17 3/22/18 If your referral has a status of pending review or denied, additional information will be sent to support the outcome of this decision. Patient Instructions DASH Diet: Care Instructions Your Care Instructions The DASH diet is an eating plan that can help lower your blood pressure. DASH stands for Dietary Approaches to Stop Hypertension. Hypertension is high blood pressure. The DASH diet focuses on eating foods that are high in calcium, potassium, and magnesium. These nutrients can lower blood pressure. The foods that are highest in these nutrients are fruits, vegetables, low-fat dairy products, nuts, seeds, and legumes. But taking calcium, potassium, and magnesium supplements instead of eating foods that are high in those nutrients does not have the same effect. The DASH diet also includes whole grains, fish, and poultry. The DASH diet is one of several lifestyle changes your doctor may recommend to lower your high blood pressure. Your doctor may also want you to decrease the amount of sodium in your diet. Lowering sodium while following the DASH diet can lower blood pressure even further than just the DASH diet alone. Follow-up care is a key part of your treatment and safety. Be sure to make and go to all appointments, and call your doctor if you are having problems. It's also a good idea to know your test results and keep a list of the medicines you take. How can you care for yourself at home? Following the DASH diet · Eat 4 to 5 servings of fruit each day. A serving is 1 medium-sized piece of fruit, ½ cup chopped or canned fruit, 1/4 cup dried fruit, or 4 ounces (½ cup) of fruit juice. Choose fruit more often than fruit juice. · Eat 4 to 5 servings of vegetables each day. A serving is 1 cup of lettuce or raw leafy vegetables, ½ cup of chopped or cooked vegetables, or 4 ounces (½ cup) of vegetable juice. Choose vegetables more often than vegetable juice. · Get 2 to 3 servings of low-fat and fat-free dairy each day. A serving is 8 ounces of milk, 1 cup of yogurt, or 1 ½ ounces of cheese. · Eat 6 to 8 servings of grains each day.  A serving is 1 slice of bread, 1 ounce of dry cereal, or ½ cup of cooked rice, pasta, or cooked cereal. Try to choose whole-grain products as much as possible. · Limit lean meat, poultry, and fish to 2 servings each day. A serving is 3 ounces, about the size of a deck of cards. · Eat 4 to 5 servings of nuts, seeds, and legumes (cooked dried beans, lentils, and split peas) each week. A serving is 1/3 cup of nuts, 2 tablespoons of seeds, or ½ cup of cooked beans or peas. · Limit fats and oils to 2 to 3 servings each day. A serving is 1 teaspoon of vegetable oil or 2 tablespoons of salad dressing. · Limit sweets and added sugars to 5 servings or less a week. A serving is 1 tablespoon jelly or jam, ½ cup sorbet, or 1 cup of lemonade. · Eat less than 2,300 milligrams (mg) of sodium a day. If you limit your sodium to 1,500 mg a day, you can lower your blood pressure even more. Tips for success · Start small. Do not try to make dramatic changes to your diet all at once. You might feel that you are missing out on your favorite foods and then be more likely to not follow the plan. Make small changes, and stick with them. Once those changes become habit, add a few more changes. · Try some of the following: ¨ Make it a goal to eat a fruit or vegetable at every meal and at snacks. This will make it easy to get the recommended amount of fruits and vegetables each day. ¨ Try yogurt topped with fruit and nuts for a snack or healthy dessert. ¨ Add lettuce, tomato, cucumber, and onion to sandwiches. ¨ Combine a ready-made pizza crust with low-fat mozzarella cheese and lots of vegetable toppings. Try using tomatoes, squash, spinach, broccoli, carrots, cauliflower, and onions. ¨ Have a variety of cut-up vegetables with a low-fat dip as an appetizer instead of chips and dip. ¨ Sprinkle sunflower seeds or chopped almonds over salads. Or try adding chopped walnuts or almonds to cooked vegetables. ¨ Try some vegetarian meals using beans and peas. Add garbanzo or kidney beans to salads. Make burritos and tacos with mashed glynn beans or black beans. Where can you learn more? Go to http://lt-aguilar.info/. Enter C796 in the search box to learn more about \"DASH Diet: Care Instructions. \" Current as of: March 23, 2016 Content Version: 11.1 © 6968-4226 [employer]Aero[employer]. Care instructions adapted under license by [employer] (which disclaims liability or warranty for this information). If you have questions about a medical condition or this instruction, always ask your healthcare professional. [place] [address] any warranty or liability for your use of this information. Check BP twice weekly. Notify me if it consistently is above 140/90 or below 90/60. Bring your blood pressure log to your next office visit. Decrease salt, fats, caffeine, alcohol in your diet. Increase water, fiber. Exercise 5 times weekly for 30 minutes daily as tolerated. Continue current medications, care and subspecialty follow up. Visit eye doctor yearly to check your eyes blood pressure related changes. WEIGHT LOSS PLAN 1. Read food labels and count calories. Goal 8088-2250 calories daily for women and 8941-4900 calories daily for men. Achieve this by decreasing caloric intake by 500 calories by weekly increments. NOTE:  1 pound = 3500 calories! Www.loseit. com Www.myfitnesspal.com Www.iChange. Braingaze Www.Talentory.comfinder. gov Weight watchers mobile Calories needed to lose 1-2 pounds a week = 10 x your weight in pounds 2. Increase water intake. Per [employer] Weight loss Program, it is important to drink 1/2 your body weight in ounces of water daily. Decrease your water consumption 2-3 hours before bedtime to prevent sleep disturbance from frequent urination. 3.  Decrease sugary beverages.   Each can or glass of soda increases your risk of obesity by 60%. Can lose 10 pounds in a month by avoiding any soda. 12 oz can of soda = 140 calories, 16 oz cup of sweet tea = 200 calories 16 oz orange juice = 200 calories, 10 oz apple juice = 150 calories 32 oz sports drink = 200 calories, 16 oz punch = 240 calories 3.5 oz alcohol = 100-150 calories 4. Avoid High Fructose Corn Syrup products. This ingredient makes products highly addictive! 5. Exercise 30 minutes daily 5 days weekly, minimally. If you burn 3500 calories that equals a pound! Use a pedometer to count steps. Visit www. Compound Time for a free pedometer and diet recommendations. Your maximum heart rate = 220 - your age Never exercise at your maximum heart rate. See handout for target heart rate. 6.  Decrease carbohydrates (white bread, pasta, rice, potatoes, sweet foods and sweet drinks like soda, tea, coffee, juice and sports drinks). Increase fiber and protein. Goal:   calories daily of carbohydrates Try CHROMIUM PICONATE 200 MG THREE TIMES DAILY,  
 to decrease Premenstrual carbohydrate cravings. 7.  Eat 3-5 small meals daily, include lots of protein (beans/legumes, nuts, lean meat, eggs) and green vegetables with each. (Breakfast, lunch, dinner with 2 healthy snacks) 8. Get proper rest 7-8 hours uninterrupted. When you get less than 6 hours, it triggers hunger by affecting your Grehlin:Leptin ratio and this results in weight gain. 9.  Watch your food portions. Green leafy vegetables should cover 1/2 of the plate, lean meat 1/4 of the plate, starchy vegetable 1/4 of the plate. Use smaller plates. 10.  Do not eat until you are full. Eat until you are no longer hungry. If you are not sure, try drinking a glass of water before getting your second serving of food. 11.  Do not weigh yourself daily. Wait until your next office visit. Use how you feel and  how your clothes fit as measurements of success. 12.  Address your spirituality to draw strength from above during your journey. Remember \"I am fearfully and wonderfully made. Marvelous in His eyes. \" 
 
13. Set realistic, appropriate and achievable weight loss goals: 
 
 RECOMMENDED TARGET WEIGHT LOSS:  Initial weight loss of 5-10% of your initial body weight achieved over 6 months or a decrease of 2 BMI units. MINIMUM GOAL OF WEIGHT LOSS:  Reduce body weight and maintain a lower body weight. Prevent weight gain. RELATED WEBSITES:     
Www.obesityaction. org 
(and consider joining the Obesity Action Coalition for $25/year. The [facility] mission is to elevated and empower those affected by obesity through education, advocacy and support. Quarterly journals included in membership fee. ) Www.ReadWorks 
www.[place] [employer] RELATED DIETS:  Dr. Francy Herndon Weight loss challenge, Mediterranean diet, Jackson Diet, Angiodroid Watchers, Paleo Diet (anti-inflammatory diet) EXERCISE 150 MINUTES WEEKLY. THIS CAN BE ACHIEVED BY WORKING OUT OR WALKING A MINIMUM OF 30 MINUTES FOR 5 DAYS WEEKLY. YOU CAN EXERCISE IN INCREMENTS OF 10-15 MINUTES UP TO 3 TIMES A DAY. Consider performing \"brainless exercise. \"  Choose your favorite tv program.  Five minutes before and for 5 minutes after the tv program, stretch your body. While the program is on, walk in place watching the show. When commercials come on, rest or walk around the house to do other things. When the program begins, return to walking in place. When you are able keep walking during the commercials and add light weights to your ankles or hands. By the end of the show, you would have walked 30 minutes. If you need shorter spurts of exercise, walk during the commercials and rest during the show. Drink to glasses of water prior to any exercise to prevent dehydration and to improve the results of the work out. Your RESTING HEART RATE is the number of times your heart beats per minute when you are not exerting yourself. The more fit you are, the lower your resting heart rate will be. Your MAXIMUM HEART RATE is the number of times per minute your heart pumps when it is working at 100% capacity. NEVER EXERCISE AT YOUR MAXIMUM HEART RATE. MAX HEART RATE = 220 - your age For example, in a 48year old, the maximum heart rate is 220-50 = 170 beats per minute Your TARGET HEART RATE is the number of beats per minute our heart should pump during aerobic exercise. Reaching your target heart rate indicates that your body is receiving maximum cardiovascular and fat burning benefits. If you are fit, your TARGET HEART RATE = 70-85% of your maximum Heart rate For a 48year old with vigorous intensity physical activity, Target heart rate= 170 bpm x .70 = 119 bpm 
   Target heart rate = 170 bpm x .85=  145 bpm 
 
    Therefore, your target heart rate during physical activity is 119-145 If you are not fit, TARGET HEART RATE = 50-70% of your maximum Heart rate MODERATE CONSISTENT AEROBIC EXERCISE OR WALKING FOR AT LEAST 150 MINUTES WEEKLY IS AN ESSENTIAL PART OF ANY WEIGHT LOSS OF WEIGHT MAINTENANCE PROGRAM. During WEIGHT LOSS PHASE, at least 75% of your exercise needs to be walking or moderate aerobic activity and 25% or 0% can be Isometric or Resistance type exercises (the latter can be deferred to the weight maintenance phase.) During WEIGHT MAINTENANCE PHASE, at least 50% of your exercise needs to be aerobic and 50% Isometric or Resistance type exercises. BENEFITS OF MODERATE INTENSITY EXERCISE MOST DAYS OF THE WEEK: 
 
 1. Insulin Resistance improves 30-85% 2. Abdominal Fat decreases by 30% 3. Inflammatory Markers decrease by 30% (therefore pain decreases) 4. Systolic and Diastolic Blood Pressure decrease by 4 mmHg 5. HDL improves by 5% 6. Triglycerides decrease by 15% 7.  Shift from small dense LDL to large dense LDL (therefore decrease Insulin Resistance) 8. Decrease coagulability DASH Diet: Care Instructions Your Care Instructions The DASH diet is an eating plan that can help lower your blood pressure. DASH stands for Dietary Approaches to Stop Hypertension. Hypertension is high blood pressure. The DASH diet focuses on eating foods that are high in calcium, potassium, and magnesium. These nutrients can lower blood pressure. The foods that are highest in these nutrients are fruits, vegetables, low-fat dairy products, nuts, seeds, and legumes. But taking calcium, potassium, and magnesium supplements instead of eating foods that are high in those nutrients does not have the same effect. The DASH diet also includes whole grains, fish, and poultry. The DASH diet is one of several lifestyle changes your doctor may recommend to lower your high blood pressure. Your doctor may also want you to decrease the amount of sodium in your diet. Lowering sodium while following the DASH diet can lower blood pressure even further than just the DASH diet alone. Follow-up care is a key part of your treatment and safety. Be sure to make and go to all appointments, and call your doctor if you are having problems. It's also a good idea to know your test results and keep a list of the medicines you take. How can you care for yourself at home? Following the DASH diet · Eat 4 to 5 servings of fruit each day. A serving is 1 medium-sized piece of fruit, ½ cup chopped or canned fruit, 1/4 cup dried fruit, or 4 ounces (½ cup) of fruit juice. Choose fruit more often than fruit juice. · Eat 4 to 5 servings of vegetables each day. A serving is 1 cup of lettuce or raw leafy vegetables, ½ cup of chopped or cooked vegetables, or 4 ounces (½ cup) of vegetable juice. Choose vegetables more often than vegetable juice. · Get 2 to 3 servings of low-fat and fat-free dairy each day. A serving is 8 ounces of milk, 1 cup of yogurt, or 1 ½ ounces of cheese. · Eat 6 to 8 servings of grains each day. A serving is 1 slice of bread, 1 ounce of dry cereal, or ½ cup of cooked rice, pasta, or cooked cereal. Try to choose whole-grain products as much as possible. · Limit lean meat, poultry, and fish to 2 servings each day. A serving is 3 ounces, about the size of a deck of cards. · Eat 4 to 5 servings of nuts, seeds, and legumes (cooked dried beans, lentils, and split peas) each week. A serving is 1/3 cup of nuts, 2 tablespoons of seeds, or ½ cup of cooked beans or peas. · Limit fats and oils to 2 to 3 servings each day. A serving is 1 teaspoon of vegetable oil or 2 tablespoons of salad dressing. · Limit sweets and added sugars to 5 servings or less a week. A serving is 1 tablespoon jelly or jam, ½ cup sorbet, or 1 cup of lemonade. · Eat less than 2,300 milligrams (mg) of sodium a day. If you limit your sodium to 1,500 mg a day, you can lower your blood pressure even more. Tips for success · Start small. Do not try to make dramatic changes to your diet all at once. You might feel that you are missing out on your favorite foods and then be more likely to not follow the plan. Make small changes, and stick with them. Once those changes become habit, add a few more changes. · Try some of the following: ¨ Make it a goal to eat a fruit or vegetable at every meal and at snacks. This will make it easy to get the recommended amount of fruits and vegetables each day. ¨ Try yogurt topped with fruit and nuts for a snack or healthy dessert. ¨ Add lettuce, tomato, cucumber, and onion to sandwiches. ¨ Combine a ready-made pizza crust with low-fat mozzarella cheese and lots of vegetable toppings. Try using tomatoes, squash, spinach, broccoli, carrots, cauliflower, and onions. ¨ Have a variety of cut-up vegetables with a low-fat dip as an appetizer instead of chips and dip. ¨ Sprinkle sunflower seeds or chopped almonds over salads. Or try adding chopped walnuts or almonds to cooked vegetables. ¨ Try some vegetarian meals using beans and peas. Add garbanzo or kidney beans to salads. Make burritos and tacos with mashed glynn beans or black beans. Where can you learn more? Go to http://tl-aguilar.info/. Enter S270 in the search box to learn more about \"DASH Diet: Care Instructions. \" Current as of: March 23, 2016 Content Version: 11.1 © 3171-2485 Certona. Care instructions adapted under license by VCE (which disclaims liability or warranty for this information). If you have questions about a medical condition or this instruction, always ask your healthcare professional. [place] [address] any warranty or liability for your use of this information. Introducing [facility]! Dear Elena Andres: Thank you for requesting a Smart Education account. Our records indicate that you already have an active Smart Education account. You can access your account anytime at [email or web address] Did you know that you can access your hospital and ER discharge instructions at any time in Smart Education? You can also review all of your test results from your hospital stay or ER visit. Additional Information If you have questions, please visit the Frequently Asked Questions section of the Smart Education website at [email or web address] Remember, Smart Education is NOT to be used for urgent needs. For medical emergencies, dial 911. Now available from your iPhone and Android! Please provide this summary of care documentation to your next provider. Your primary care clinician is listed as Abdullahi Salter. If you have any questions after today's visit, please call [phone].

## 2017-03-22 NOTE — ACP (ADVANCE CARE PLANNING)
Discussed ACP with patient. Gave pt an Honoring Choices folder. Accepts referral to Honoring Choices team.  RICK Tomlin RN and Rafa Valverde RN notified by staff message for follow up.

## 2017-03-30 LAB
25(OH)D3+25(OH)D2 SERPL-MCNC: 8.8 NG/ML (ref 30–100)
ALBUMIN SERPL-MCNC: 3.8 G/DL (ref 3.5–5.5)
ALBUMIN/GLOB SERPL: 1.1 {RATIO} (ref 1.2–2.2)
ALP SERPL-CCNC: 96 IU/L (ref 39–117)
ALT SERPL-CCNC: 14 IU/L (ref 0–32)
AST SERPL-CCNC: 15 IU/L (ref 0–40)
BASOPHILS # BLD AUTO: 0 X10E3/UL (ref 0–0.2)
BASOPHILS NFR BLD AUTO: 0 %
BILIRUB SERPL-MCNC: 0.2 MG/DL (ref 0–1.2)
BUN SERPL-MCNC: 10 MG/DL (ref 6–24)
BUN/CREAT SERPL: 13 (ref 9–23)
CALCIUM SERPL-MCNC: 8.9 MG/DL (ref 8.7–10.2)
CHLORIDE SERPL-SCNC: 101 MMOL/L (ref 96–106)
CHOLEST SERPL-MCNC: 205 MG/DL (ref 100–199)
CO2 SERPL-SCNC: 24 MMOL/L (ref 18–29)
CREAT SERPL-MCNC: 0.79 MG/DL (ref 0.57–1)
CREAT UR-MCNC: NORMAL MG/DL
EOSINOPHIL # BLD AUTO: 0.2 X10E3/UL (ref 0–0.4)
EOSINOPHIL NFR BLD AUTO: 3 %
ERYTHROCYTE [DISTWIDTH] IN BLOOD BY AUTOMATED COUNT: 17.7 % (ref 12.3–15.4)
GLOBULIN SER CALC-MCNC: 3.6 G/DL (ref 1.5–4.5)
GLUCOSE SERPL-MCNC: 106 MG/DL (ref 65–99)
GLUCOSE UR QL: NORMAL
HCT VFR BLD AUTO: 32.1 % (ref 34–46.6)
HDLC SERPL-MCNC: 77 MG/DL
HGB BLD-MCNC: 9.5 G/DL (ref 11.1–15.9)
IMM GRANULOCYTES # BLD: 0 X10E3/UL (ref 0–0.1)
IMM GRANULOCYTES NFR BLD: 0 %
INTERPRETATION, 910389: NORMAL
KETONES UR QL STRIP: NORMAL
LDLC SERPL CALC-MCNC: 118 MG/DL (ref 0–99)
LYMPHOCYTES # BLD AUTO: 1.8 X10E3/UL (ref 0.7–3.1)
LYMPHOCYTES NFR BLD AUTO: 20 %
MCH RBC QN AUTO: 21.2 PG (ref 26.6–33)
MCHC RBC AUTO-ENTMCNC: 29.6 G/DL (ref 31.5–35.7)
MCV RBC AUTO: 72 FL (ref 79–97)
MICROALBUMIN UR-MCNC: NORMAL
MONOCYTES # BLD AUTO: 0.5 X10E3/UL (ref 0.1–0.9)
MONOCYTES NFR BLD AUTO: 5 %
NEUTROPHILS # BLD AUTO: 6.4 X10E3/UL (ref 1.4–7)
NEUTROPHILS NFR BLD AUTO: 72 %
PH UR STRIP: NORMAL [PH]
PLATELET # BLD AUTO: 532 X10E3/UL (ref 150–379)
POTASSIUM SERPL-SCNC: 4 MMOL/L (ref 3.5–5.2)
PROT SERPL-MCNC: 7.4 G/DL (ref 6–8.5)
PROT UR QL STRIP: NORMAL
RBC # BLD AUTO: 4.49 X10E6/UL (ref 3.77–5.28)
SODIUM SERPL-SCNC: 141 MMOL/L (ref 134–144)
SP GR UR: NORMAL
TRIGL SERPL-MCNC: 51 MG/DL (ref 0–149)
TSH SERPL DL<=0.005 MIU/L-ACNC: 0.87 UIU/ML (ref 0.45–4.5)
VLDLC SERPL CALC-MCNC: 10 MG/DL (ref 5–40)
WBC # BLD AUTO: 8.9 X10E3/UL (ref 3.4–10.8)

## 2017-07-06 ENCOUNTER — OFFICE VISIT (OUTPATIENT)
Dept: FAMILY MEDICINE CLINIC | Age: 42
End: 2017-07-06

## 2017-07-06 VITALS
WEIGHT: 215.4 LBS | TEMPERATURE: 98.5 F | RESPIRATION RATE: 16 BRPM | DIASTOLIC BLOOD PRESSURE: 88 MMHG | OXYGEN SATURATION: 100 % | HEART RATE: 93 BPM | SYSTOLIC BLOOD PRESSURE: 147 MMHG | HEIGHT: 62 IN | BODY MASS INDEX: 39.64 KG/M2

## 2017-07-06 DIAGNOSIS — H69.83 ETD (EUSTACHIAN TUBE DYSFUNCTION), BILATERAL: ICD-10-CM

## 2017-07-06 DIAGNOSIS — D50.0 IRON DEFICIENCY ANEMIA DUE TO CHRONIC BLOOD LOSS: ICD-10-CM

## 2017-07-06 DIAGNOSIS — I10 ESSENTIAL HYPERTENSION, BENIGN: Primary | ICD-10-CM

## 2017-07-06 DIAGNOSIS — H60.332 ACUTE SWIMMER'S EAR OF LEFT SIDE: ICD-10-CM

## 2017-07-06 DIAGNOSIS — J30.1 SEASONAL ALLERGIC RHINITIS DUE TO POLLEN: ICD-10-CM

## 2017-07-06 DIAGNOSIS — E55.9 VITAMIN D DEFICIENCY: ICD-10-CM

## 2017-07-06 DIAGNOSIS — D75.839 THROMBOCYTOSIS: ICD-10-CM

## 2017-07-06 DIAGNOSIS — N92.0 MENORRHAGIA WITH REGULAR CYCLE: ICD-10-CM

## 2017-07-06 DIAGNOSIS — E78.00 PURE HYPERCHOLESTEROLEMIA: ICD-10-CM

## 2017-07-06 DIAGNOSIS — E66.9 OBESITY, CLASS II, BMI 35-39.9: ICD-10-CM

## 2017-07-06 PROBLEM — H69.80 ETD (EUSTACHIAN TUBE DYSFUNCTION): Status: ACTIVE | Noted: 2017-07-06

## 2017-07-06 RX ORDER — ERGOCALCIFEROL 1.25 MG/1
50000 CAPSULE ORAL
Qty: 5 CAP | Refills: 2 | Status: SHIPPED | OUTPATIENT
Start: 2017-07-06 | End: 2017-07-17

## 2017-07-06 RX ORDER — CIPROFLOXACIN AND DEXAMETHASONE 3; 1 MG/ML; MG/ML
4 SUSPENSION/ DROPS AURICULAR (OTIC) 2 TIMES DAILY
Qty: 10 ML | Refills: 1 | Status: SHIPPED | OUTPATIENT
Start: 2017-07-06 | End: 2017-07-16

## 2017-07-06 NOTE — PROGRESS NOTES
Chief Complaint   Patient presents with    Blood Pressure Check    Results    Ear Pain     left ear; more of a discomfort than a pain; \"feels tense\"     1. Have you been to the ER, urgent care clinic since your last visit? Hospitalized since your last visit? No    2. Have you seen or consulted any other health care providers outside of the [address] [place] [address] since your last visit? Include any pap smears or colon screening. No    In the event something were to happen to you and you were unable to speak on your behalf, do you have an Advance Directive/ Living Will in place stating your wishes? NO    If yes, do we have a copy on file NO    If no, would you like information:   Pt offered and declined.

## 2017-07-06 NOTE — MR AVS SNAPSHOT
Visit Information Date & Time Provider Department Dept. Phone Encounter #  
 7/6/2017  3:00 PM Giana Elaine DO [facility] [phone] [id number] Follow-up Instructions Return in about 4 months (around 11/6/2017) for bp, results. Upcoming Health Maintenance Date Due INFLUENZA AGE 9 TO ADULT 8/1/2017 Pneumococcal 19-64 Highest Risk (2 of 3 - PCV13) 3/30/2018 PAP AKA CERVICAL CYTOLOGY 3/22/2020 DTaP/Tdap/Td series (2 - Td) 3/22/2027 Allergies as of 7/6/2017  Review Complete On: 7/6/2017 By: Theodora Choudhury Severity Noted Reaction Type Reactions Bactrim [Sulfamethoxazole-trimethoprim]  04/12/2010    Other (comments) GI Upset Current Immunizations  Reviewed on 7/6/2017 No immunizations on file. Reviewed by Giana Elaine DO on 7/6/2017 at  4:54 PM  
You Were Diagnosed With   
  
 Codes Comments Essential hypertension, benign    -  Primary ICD-10-CM: I10 
ICD-9-CM: 401.1 Pure hypercholesterolemia     ICD-10-CM: E78.00 ICD-9-CM: 272.0 slightly worse Menorrhagia with regular cycle     ICD-10-CM: N92.0 ICD-9-CM: 626.2 with clots and fewer days Seasonal allergic rhinitis due to pollen     ICD-10-CM: J30.1 ICD-9-CM: 477.0 stable with singulair Vitamin D deficiency     ICD-10-CM: E55.9 ICD-9-CM: 268.9 Iron deficiency anemia due to chronic blood loss     ICD-10-CM: D50.0 ICD-9-CM: 280.0 heavy menses, slightly better Thrombocytosis ([address]     ICD-10-CM: D47.3 ICD-9-CM: 238.71 Obesity, Class II, BMI 35-39.9 (HCC)     ICD-10-CM: E66.9 ICD-9-CM: 278.00 Acute swimmer's ear of left side     ICD-10-CM: H60.332 ICD-9-CM: 380.12 Vitals BP Pulse Temp Resp Height(growth percentile) Weight(growth percentile) 147/88 (BP 1 Location: Right arm, BP Patient Position: Sitting) 93 98.5 °F (36.9 °C) (Oral) 16 5' 2.01\" (1.575 m) 215 lb 6.4 oz (97.7 kg) SpO2 BMI OB Status Smoking Status 100% 39.39 kg/m2 Having regular periods Never Smoker BMI and BSA Data Body Mass Index Body Surface Area  
 39.39 kg/m 2 2.07 m 2 Preferred Pharmacy Pharmacy Name Phone [facility], [facility] AT [address] [place] [address] Drive [phone] Your Updated Medication List  
  
   
This list is accurate as of: 7/6/17  5:01 PM.  Always use your most recent med list. amLODIPine 10 mg tablet Commonly known as:  Orval Arpita Take 1 Tab by mouth daily. Indications: hypertension  
  
 ergocalciferol 50,000 unit capsule Commonly known as:  ERGOCALCIFEROL Take 1 Cap by mouth every seven (7) days. Indications: VITAMIN D DEFICIENCY Ferrous Sulfate 250 mg (50 mg iron) Tber Take  by mouth.  
  
 lisinopril 40 mg tablet Commonly known as:  Michael Handing Take 1 Tab by mouth daily. Indications: hypertension  
  
 montelukast 10 mg tablet Commonly known as:  SINGULAIR Take 1 Tab by mouth daily. Indications: ALLERGIC RHINITIS  
  
 PROAIR HFA 90 mcg/actuation inhaler Generic drug:  albuterol Take 2 Puffs by inhalation every four (4) hours as needed for Wheezing. Indications: BRONCHOSPASM PREVENTION Prescriptions Sent to Pharmacy Refills  
 ergocalciferol (ERGOCALCIFEROL) 50,000 unit capsule 2 Sig: Take 1 Cap by mouth every seven (7) days. Indications: VITAMIN D DEFICIENCY Class: Normal  
 Pharmacy: [facility] [address] AT [address] [place] [address] Drive P Ph #: [phone] Route: Oral  
  
We Performed the Following CBC W/O DIFF [25477 CPT(R)] LIPID PANEL [97166 CPT(R)] MICROALBUMIN, UR, RAND W/ MICROALBUMIN/CREA RATIO [id number] CPT(R)] REFERRAL TO OBSTETRICS AND GYNECOLOGY [REF51 Custom] Comments:  
 [address], [address], [address] URINALYSIS W/ RFLX MICROSCOPIC [04225 CPT(R)] VITAMIN D, 25 HYDROXY [id number] CPT(R)] Follow-up Instructions Return in about 4 months (around 11/6/2017) for bp, results. Referral Information Referral ID Referred By Referred To  
  
 [id number] [facility] [address] [place], [address] Phone: [phone] Fax: [phone] Visits Status Start Date End Date 1 New Request 7/6/17 7/6/18 If your referral has a status of pending review or denied, additional information will be sent to support the outcome of this decision. Introducing [facility] HEALTH SERVICES! Dear Salena Graham: Thank you for requesting a [employer] account. Our records indicate that you already have an active [employer] account. You can access your account anytime at [email or web address] Did you know that you can access your hospital and ER discharge instructions at any time in [employer]? You can also review all of your test results from your hospital stay or ER visit. Additional Information If you have questions, please visit the Frequently Asked Questions section of the [employer] website at [email or web address] Remember, [employer] is NOT to be used for urgent needs. For medical emergencies, dial 911. Now available from your iPhone and Android! Please provide this summary of care documentation to your next provider. Your primary care clinician is listed as Rayne Mcfarlane. If you have any questions after today's visit, please call [phone].

## 2017-07-06 NOTE — PROGRESS NOTES
HISTORY OF PRESENT ILLNESS  Maikol Calvillo is a 35 y.o. female presents with Blood Pressure Check; Results; Ear Pain (left ear; more of a discomfort than a pain; \"feels tense\"); and Menstrual Problem    Agree with nurse note. Hypertensive pt with iron deficiency anemia, thrombocytosis, Vit D deficiency, and hypercholesterolemia presents to the office with a BP of 147/88. At last ov, we increased Norvasc from 5 to 10 mg and kept Lisinopril at 40 mg. For BP, she takes Lisinopril 40 mg daily and Norvasc 10 mg daily, tolerating well. She requests her most recent labs from 03/29/17. Hgb was 9.5. Hct was 32.1. MCV was 72. Platelets were 064. Vit D was 8.8. TSH was 0.872. Glucose was 106. LDL was 118. Denies fatigue. Pt weighs 215 lbs, lost 2 lbs since last ov. She has started walking x2 days weekly. Pt with seasonal allergies. At her last ov, I started her on Singulair 10 mg and she reports that her sxs have been well controlled. She uses ProAir HFA prn. She never saw the pulmonologist since she has been stable. She notes she still has heavy periods with clots but overall her cycle lasts fewer days. She is s/p D&C since 08/11/15 due to heavy vaginal bleeding and thick endometrial stripe, performed by GYN, Dr. Kim Segundo. Requests a referral to a new GYN. Health Maintenance    Pt's most recent mammogram was on 03/16/17 at the [facility]; normal.     Written by gilda Andrade, as dictated by Dr. Regina Correa DO.    ROS    Review of Systems negative except as noted above in HPI. ALLERGIES:    Allergies   Allergen Reactions    Bactrim [Sulfamethoxazole-Trimethoprim] Other (comments)     GI Upset       CURRENT MEDICATIONS:    Outpatient Prescriptions Marked as Taking for the 7/6/17 encounter (Office Visit) with Maggie Lafleur DO   Medication Sig Dispense Refill    ergocalciferol (ERGOCALCIFEROL) 50,000 unit capsule Take 1 Cap by mouth every seven (7) days. Indications: VITAMIN D DEFICIENCY 5 Cap 2    ciprofloxacin-dexamethasone (CIPRODEX) 0.3-0.1 % otic suspension Administer 4 Drops in left ear two (2) times a day for 10 days. Indications: outer ear infection 10 mL 1    Ferrous Sulfate 250 mg (50 mg iron) TbER Take  by mouth.  PROAIR HFA 90 mcg/actuation inhaler Take 2 Puffs by inhalation every four (4) hours as needed for Wheezing. Indications: BRONCHOSPASM PREVENTION 1 Inhaler 1    lisinopril (PRINIVIL, ZESTRIL) 40 mg tablet Take 1 Tab by mouth daily. Indications: hypertension 30 Tab 11    amLODIPine (NORVASC) 10 mg tablet Take 1 Tab by mouth daily. Indications: hypertension 30 Tab 5    montelukast (SINGULAIR) 10 mg tablet Take 1 Tab by mouth daily. Indications: ALLERGIC RHINITIS 30 Tab 5       PAST MEDICAL HISTORY:    Past Medical History:   Diagnosis Date    Allergy, unspecified not elsewhere classified     Anemia     Anemia     Ankle sprain 03/2009    bilateral.  Dr. Taniya Grewal Breast mass 5/5/09    Left. benign.  Chickenpox childhood    Ectopic pregnancy 04/2010    Right. Dr. Nolberto Cervantes hypertension, benign     Menorrhagia 08/2015    Dr. Willy Askew Ovarian cyst 2011    R. Dr. Richard Chavez Pure hypercholesterolemia 05/2009    Thrombocytosis ([address] 05/2009    Tubal pregnancy 06/2011    Dr Diana Louie     Vitamin D deficiency 05/2009       PAST SURGICAL HISTORY:    Past Surgical History:   Procedure Laterality Date    HX DILATION AND CURETTAGE  8/11/15    Dr. Maco Lisa. due to heavy vag bleeding and thick endometrial stripe.  HX SALPINGO-OOPHORECTOMY Right 04/2010    Right. due to ectopic pregnancy. Dr. Diana Luoie.     HX TUBAL LIGATION  2002    Dr. Leo Morillo HISTORY:    Family History   Problem Relation Age of Onset    Diabetes Mother      type ll    Hypertension Mother     Diabetes Father      type ll       SOCIAL HISTORY:    Social History     Social History    Marital status:      Spouse name: N/A    Number of children: N/A    Years of education: N/A     Social History Main Topics    Smoking status: Never Smoker    Smokeless tobacco: Never Used    Alcohol use No    Drug use: No    Sexual activity: Yes     Partners: Male     Birth control/ protection: None     Other Topics Concern    None     Social History Narrative       IMMUNIZATIONS:    There is no immunization history on file for this patient. PHYSICAL EXAMINATION    Vital Signs    Visit Vitals    /88 (BP 1 Location: Right arm, BP Patient Position: Sitting)    Pulse 93    Temp 98.5 °F (36.9 °C) (Oral)    Resp 16    Ht 5' 2.01\" (1.575 m)    Wt 215 lb 6.4 oz (97.7 kg)    SpO2 100%    BMI 39.39 kg/m2       Weight Metrics 7/6/2017 3/22/2017 3/16/2016 3/16/2016 9/9/2015 8/26/2015 7/21/2015   Weight 215 lb 6.4 oz 217 lb 8 oz 213 lb 3 oz 213 lb 6.4 oz 180 lb 202 lb 9.6 oz 201 lb 11.2 oz   BMI 39.39 kg/m2 39.78 kg/m2 38.98 kg/m2 39.02 kg/m2 32.91 kg/m2 37.05 kg/m2 36.88 kg/m2       General appearance - Well nourished. Well appearing. Well developed. No acute distress. Obese. Head - Normocephalic. Atraumatic. Eyes - pupils equal and reactive. Extraocular eye movements intact. Sclera anicteric. Mildly injected sclera. Ears - Hearing is grossly normal bilaterally. Over 50% occlusion on R EAC with moderate TM retraction. Pain with otoscope insertion in L ear. Nose - normal and patent. No polyps noted. No erythema. No discharge. Mouth - mucous membranes with adequate moisture. Posterior pharynx normal with cobblestone appearance. No erythema, white exudate or obstruction. Neck - supple. Midline trachea. No carotid bruits noted bilaterally. No thyromegaly noted. Chest - clear to auscultation bilaterally anteriorly and posteriorly. No wheezes. No rales or rhonchi. Breath sounds are symmetrical bilaterally. Unlabored respirations. Heart - normal rate.   Regular rhythm. Normal S1, S2. No murmur noted. No rubs, clicks or gallops noted. Abdomen - soft and distended. No masses or organomegaly. No rebound, rigidity or guarding. Bowel sounds normal x 4 quadrants. No tenderness noted. Neurological - awake, alert and oriented to person, place, and time and event. Cranial nerves II through XII intact. Clear speech. Muscle strength is +5/5 x 4 extremities. Sensation is intact to light touch bilaterally. Steady gait. Heme/Lymph - peripheral pulses normal x 4 extremities. No peripheral edema is noted. Musculoskeletal - Intact x 4 extremities. Full ROM x 4 extremities. No pain with movement. Back exam - normal range of motion. No pain on palpation of the spinous processes in the cervical, thoracic, lumbar, sacral regions. No CVA tenderness. Skin - no rashes, erythema, ecchymosis, lacerations, abrasions, suspicious moles noted  Psychological -   normal behavior, dress and thought processes. Good insight. Good eye contact. Normal affect. Appropriate mood. Normal speech.       DATA REVIEWED    Results for orders placed or performed in visit on 04/04/17    MAMMOGRAPHY   Result Value Ref Range    Mammography, External BR1 negative mammogram, return x1yr      Lab Results   Component Value Date/Time    WBC 8.9 03/29/2017 08:28 AM    HGB 9.5 03/29/2017 08:28 AM    HCT 32.1 03/29/2017 08:28 AM    PLATELET 330 35/60/9059 08:28 AM    MCV 72 03/29/2017 08:28 AM     Lab Results   Component Value Date/Time    Sodium 141 03/29/2017 08:28 AM    Potassium 4.0 03/29/2017 08:28 AM    Chloride 101 03/29/2017 08:28 AM    CO2 24 03/29/2017 08:28 AM    Anion gap 6 03/16/2016 11:38 AM    Glucose 106 03/29/2017 08:28 AM    BUN 10 03/29/2017 08:28 AM    Creatinine 0.79 03/29/2017 08:28 AM    BUN/Creatinine ratio 13 03/29/2017 08:28 AM    GFR est  03/29/2017 08:28 AM    GFR est non-AA 92 03/29/2017 08:28 AM    Calcium 8.9 03/29/2017 08:28 AM    Bilirubin, total 0.2 03/29/2017 08:28 AM    AST (SGOT) 15 03/29/2017 08:28 AM    Alk. phosphatase 96 03/29/2017 08:28 AM    Protein, total 7.4 03/29/2017 08:28 AM    Albumin 3.8 03/29/2017 08:28 AM    Globulin 5.0 03/16/2016 11:38 AM    A-G Ratio 1.1 03/29/2017 08:28 AM    ALT (SGPT) 14 03/29/2017 08:28 AM     Lab Results   Component Value Date/Time    Cholesterol, total 205 03/29/2017 08:28 AM    HDL Cholesterol 77 03/29/2017 08:28 AM    LDL, calculated 118 03/29/2017 08:28 AM    VLDL, calculated 10 03/29/2017 08:28 AM    Triglyceride 51 03/29/2017 08:28 AM     Lab Results   Component Value Date/Time    VITAMIN D, 25-HYDROXY 8.8 03/29/2017 08:28 AM       Lab Results   Component Value Date/Time    Hemoglobin A1c 5.6 08/27/2015 08:56 AM     Lab Results   Component Value Date/Time    TSH 0.872 03/29/2017 08:28 AM     Lab Results   Component Value Date/Time    Microalb/Creat ratio (ug/mg creat.) 31.0 08/27/2015 09:09 AM       ASSESSMENT and PLAN      ICD-10-CM ICD-9-CM    1. Essential hypertension, benign I10 401.1 MICROALBUMIN, UR, RAND W/ MICROALBUMIN/CREA RATIO      URINALYSIS W/ RFLX MICROSCOPIC   2. Pure hypercholesterolemia E78.00 272.0 LIPID PANEL    slightly worse   3. Menorrhagia with regular cycle N92.0 626.2 REFERRAL TO OBSTETRICS AND GYNECOLOGY      CBC W/O DIFF    with clots and fewer days   4. Seasonal allergic rhinitis due to pollen J30.1 477.0     stable with singulair   5. Acute swimmer's ear of left side H60.332 380.12 ciprofloxacin-dexamethasone (CIPRODEX) 0.3-0.1 % otic suspension   6. Vitamin D deficiency E55.9 268.9 ergocalciferol (ERGOCALCIFEROL) 50,000 unit capsule      VITAMIN D, 25 HYDROXY   7. Iron deficiency anemia due to chronic blood loss D50.0 280.0 REFERRAL TO OBSTETRICS AND GYNECOLOGY      CBC W/O DIFF    heavy menses, slightly better   8. Thrombocytosis (HCC) D47.3 238.71    9. Obesity, Class II, BMI 35-39.9 (HCC) E66.9 278.00    10.  ETD (eustachian tube dysfunction), bilateral H69.83 381.81 Discussed the patient's BMI with her. The BMI follow up plan is as follows: I have counseled this patient on diet and exercise regimens. Decrease carbohydrates (white foods, sweet foods, sweet drinks and alcohol), increase green leafy vegetables and protein (lean meats and beans) with each meal.  Avoid fried foods. Eat 3-5 small meals daily. Do not skip meals. Increase water intake. Increase physical activity to 30 minutes daily for health benefit or 60 minutes daily to prevent weight regain, as tolerated. Get 7-8 hours uninterrupted sleep nightly. Chart reviewed and updated. Continue current medications and care. Use Ciprodex otic drops in L ear. Recommend OTC nasal spray. Start Rx Vitamin D 50,000IU weekly x3 months and then take OTC Vitamin D 5,000IU daily. Prescriptions written and sent to pharmacy; medication side effects discussed. Vit D 50,000IU. Ciprodex 0.3-0.1% otic suspension. Most recent tests reviewed from 03/29/17. Recheck urine today. Recheck pertinent labs when fasting in 3 months. Get recent office visit notes from Dr. Zuri Scott. Advised pt to sign release. Referrals given; patient urged to keep appointments with specialists. GYN. Counseled patient on health concerns:  BP, anemia, thrombocytosis, cholesterol, Vit D deficiency, swimmers ear and ETD. Immunizations noted. Offered empathy, support, legitimation, prayers, partnership to patient. Praised patient for progress. Follow-up Disposition:  Return in about 4 months (around 11/6/2017) for bp, results. Patient was offered a choice/choices in the treatment plan today. Patient expresses understanding of the plan and agrees with recommendations. Patient declines any additional handouts. Patient is satisfied with previous handouts received from our office    Written by gilda Carbajal, as dictated by Dr. Nina Negrete DO. Documentation True and Accepted by Thomas Matias.  Elio Zavala.

## 2017-07-17 ENCOUNTER — HOSPITAL ENCOUNTER (EMERGENCY)
Age: 42
Discharge: HOME | End: 2017-07-17
Attending: EMERGENCY MEDICINE
Payer: OTHER MISCELLANEOUS

## 2017-07-17 VITALS
SYSTOLIC BLOOD PRESSURE: 151 MMHG | BODY MASS INDEX: 39.6 KG/M2 | HEIGHT: 62 IN | DIASTOLIC BLOOD PRESSURE: 85 MMHG | RESPIRATION RATE: 18 BRPM | OXYGEN SATURATION: 100 % | WEIGHT: 215.2 LBS | TEMPERATURE: 98.1 F | HEART RATE: 89 BPM

## 2017-07-17 DIAGNOSIS — Z77.098 EXPOSURE TO INDUSTRIAL FUMES: Primary | ICD-10-CM

## 2017-07-17 LAB
ALBUMIN SERPL BCP-MCNC: 3.2 G/DL (ref 3.5–5)
ALBUMIN/GLOB SERPL: 0.6 {RATIO} (ref 1.1–2.2)
ALP SERPL-CCNC: 111 U/L (ref 45–117)
ALT SERPL-CCNC: 24 U/L (ref 12–78)
ANION GAP BLD CALC-SCNC: 7 MMOL/L (ref 5–15)
ARTERIAL PATENCY WRIST A: YES
AST SERPL W P-5'-P-CCNC: 15 U/L (ref 15–37)
BASE EXCESS BLD CALC-SCNC: 2 MMOL/L
BASOPHILS # BLD AUTO: 0 K/UL (ref 0–0.1)
BASOPHILS # BLD: 0 % (ref 0–1)
BDY SITE: ABNORMAL
BILIRUB SERPL-MCNC: 0.1 MG/DL (ref 0.2–1)
BUN SERPL-MCNC: 8 MG/DL (ref 6–20)
BUN/CREAT SERPL: 8 (ref 12–20)
CALCIUM SERPL-MCNC: 8.7 MG/DL (ref 8.5–10.1)
CHLORIDE SERPL-SCNC: 101 MMOL/L (ref 97–108)
CO2 SERPL-SCNC: 29 MMOL/L (ref 21–32)
CREAT SERPL-MCNC: 1.02 MG/DL (ref 0.55–1.02)
DIFFERENTIAL METHOD BLD: ABNORMAL
EOSINOPHIL # BLD: 0.4 K/UL (ref 0–0.4)
EOSINOPHIL NFR BLD: 3 % (ref 0–7)
ERYTHROCYTE [DISTWIDTH] IN BLOOD BY AUTOMATED COUNT: 18.4 % (ref 11.5–14.5)
GAS FLOW.O2 O2 DELIVERY SYS: ABNORMAL L/MIN
GLOBULIN SER CALC-MCNC: 5.3 G/DL (ref 2–4)
GLUCOSE SERPL-MCNC: 135 MG/DL (ref 65–100)
HCO3 BLD-SCNC: 26.6 MMOL/L (ref 22–26)
HCT VFR BLD AUTO: 33 % (ref 35–47)
HGB BLD-MCNC: 9.6 G/DL (ref 11.5–16)
LYMPHOCYTES # BLD AUTO: 24 % (ref 12–49)
LYMPHOCYTES # BLD: 2.8 K/UL (ref 0.8–3.5)
MCH RBC QN AUTO: 20.2 PG (ref 26–34)
MCHC RBC AUTO-ENTMCNC: 29.1 G/DL (ref 30–36.5)
MCV RBC AUTO: 69.3 FL (ref 80–99)
MONOCYTES # BLD: 0.7 K/UL (ref 0–1)
MONOCYTES NFR BLD AUTO: 6 % (ref 5–13)
NEUTS SEG # BLD: 7.9 K/UL (ref 1.8–8)
NEUTS SEG NFR BLD AUTO: 67 % (ref 32–75)
O2/TOTAL GAS SETTING VFR VENT: 0.21 %
PCO2 BLD: 44 MMHG (ref 35–45)
PH BLD: 7.39 [PH] (ref 7.35–7.45)
PLATELET # BLD AUTO: 525 K/UL (ref 150–400)
PO2 BLD: 98 MMHG (ref 80–100)
POTASSIUM SERPL-SCNC: 3.2 MMOL/L (ref 3.5–5.1)
PROT SERPL-MCNC: 8.5 G/DL (ref 6.4–8.2)
RBC # BLD AUTO: 4.76 M/UL (ref 3.8–5.2)
RBC MORPH BLD: ABNORMAL
SAO2 % BLD: 97 % (ref 92–97)
SODIUM SERPL-SCNC: 137 MMOL/L (ref 136–145)
SPECIMEN TYPE: ABNORMAL
TROPONIN I SERPL-MCNC: <0.04 NG/ML
WBC # BLD AUTO: 11.8 K/UL (ref 3.6–11)

## 2017-07-17 PROCEDURE — 82375 ASSAY CARBOXYHB QUANT: CPT | Performed by: EMERGENCY MEDICINE

## 2017-07-17 PROCEDURE — 84484 ASSAY OF TROPONIN QUANT: CPT | Performed by: EMERGENCY MEDICINE

## 2017-07-17 PROCEDURE — 99284 EMERGENCY DEPT VISIT MOD MDM: CPT

## 2017-07-17 PROCEDURE — 82803 BLOOD GASES ANY COMBINATION: CPT

## 2017-07-17 PROCEDURE — 93005 ELECTROCARDIOGRAM TRACING: CPT

## 2017-07-17 PROCEDURE — 85025 COMPLETE CBC W/AUTO DIFF WBC: CPT | Performed by: EMERGENCY MEDICINE

## 2017-07-17 PROCEDURE — 80053 COMPREHEN METABOLIC PANEL: CPT | Performed by: EMERGENCY MEDICINE

## 2017-07-17 PROCEDURE — 36600 WITHDRAWAL OF ARTERIAL BLOOD: CPT

## 2017-07-17 PROCEDURE — 36415 COLL VENOUS BLD VENIPUNCTURE: CPT | Performed by: EMERGENCY MEDICINE

## 2017-07-17 NOTE — LETTER
[address] 
[address] [facility] [id number] 
[phone] Work/School Note Date: 7/17/2017 To Whom It May concern: 
 
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 
Attending Provider: Katarina Wagner MD   
 
Minor Ren may return to work on 7/18/17 Sincerely, Katarina Wagner MD
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
## 2017-07-18 LAB
ATRIAL RATE: 83 BPM
CALCULATED P AXIS, ECG09: 57 DEGREES
CALCULATED R AXIS, ECG10: 7 DEGREES
CALCULATED T AXIS, ECG11: 4 DEGREES
DIAGNOSIS, 93000: NORMAL
P-R INTERVAL, ECG05: 156 MS
Q-T INTERVAL, ECG07: 382 MS
QRS DURATION, ECG06: 76 MS
QTC CALCULATION (BEZET), ECG08: 448 MS
VENTRICULAR RATE, ECG03: 83 BPM

## 2018-04-04 ENCOUNTER — OFFICE VISIT (OUTPATIENT)
Dept: FAMILY MEDICINE CLINIC | Age: 43
End: 2018-04-04

## 2018-04-04 VITALS
WEIGHT: 200.5 LBS | TEMPERATURE: 98 F | HEART RATE: 92 BPM | HEIGHT: 62 IN | SYSTOLIC BLOOD PRESSURE: 130 MMHG | BODY MASS INDEX: 36.9 KG/M2 | RESPIRATION RATE: 12 BRPM | OXYGEN SATURATION: 100 % | DIASTOLIC BLOOD PRESSURE: 80 MMHG

## 2018-04-04 DIAGNOSIS — D72.828 OTHER ELEVATED WHITE BLOOD CELL (WBC) COUNT: ICD-10-CM

## 2018-04-04 DIAGNOSIS — T75.3XXA MOTION SICKNESS, INITIAL ENCOUNTER: ICD-10-CM

## 2018-04-04 DIAGNOSIS — D75.839 THROMBOCYTOSIS: ICD-10-CM

## 2018-04-04 DIAGNOSIS — J45.20 RAD (REACTIVE AIRWAY DISEASE) WITH WHEEZING, MILD INTERMITTENT, UNCOMPLICATED: ICD-10-CM

## 2018-04-04 DIAGNOSIS — J00 ACUTE NASOPHARYNGITIS: ICD-10-CM

## 2018-04-04 DIAGNOSIS — E78.00 PURE HYPERCHOLESTEROLEMIA: ICD-10-CM

## 2018-04-04 DIAGNOSIS — N93.8 DUB (DYSFUNCTIONAL UTERINE BLEEDING): ICD-10-CM

## 2018-04-04 DIAGNOSIS — E55.9 VITAMIN D DEFICIENCY: ICD-10-CM

## 2018-04-04 DIAGNOSIS — J30.89 SEASONAL ALLERGIC RHINITIS DUE TO OTHER ALLERGIC TRIGGER: ICD-10-CM

## 2018-04-04 DIAGNOSIS — Z00.00 WELL WOMAN EXAM WITHOUT GYNECOLOGICAL EXAM: Primary | ICD-10-CM

## 2018-04-04 DIAGNOSIS — D50.8 OTHER IRON DEFICIENCY ANEMIA: ICD-10-CM

## 2018-04-04 DIAGNOSIS — R63.4 WEIGHT LOSS: ICD-10-CM

## 2018-04-04 DIAGNOSIS — J45.21 MILD INTERMITTENT ASTHMA WITH ACUTE EXACERBATION: ICD-10-CM

## 2018-04-04 DIAGNOSIS — Z23 NEED FOR TDAP VACCINATION: ICD-10-CM

## 2018-04-04 DIAGNOSIS — R30.0 DYSURIA: ICD-10-CM

## 2018-04-04 DIAGNOSIS — I10 ESSENTIAL HYPERTENSION, BENIGN: ICD-10-CM

## 2018-04-04 RX ORDER — LISINOPRIL 40 MG/1
60 TABLET ORAL DAILY
Qty: 45 TAB | Refills: 11 | Status: SHIPPED | OUTPATIENT
Start: 2018-04-04 | End: 2019-04-11 | Stop reason: SDUPTHER

## 2018-04-04 RX ORDER — GUAIFENESIN 600 MG/1
600 TABLET, EXTENDED RELEASE ORAL 2 TIMES DAILY
COMMUNITY
End: 2018-07-23 | Stop reason: ALTCHOICE

## 2018-04-04 RX ORDER — CETIRIZINE HCL 10 MG
TABLET ORAL
COMMUNITY

## 2018-04-04 RX ORDER — ALBUTEROL SULFATE 90 UG/1
2 AEROSOL, METERED RESPIRATORY (INHALATION)
Qty: 1 INHALER | Refills: 1 | Status: SHIPPED | OUTPATIENT
Start: 2018-04-04 | End: 2019-03-19 | Stop reason: SDUPTHER

## 2018-04-04 RX ORDER — MONTELUKAST SODIUM 10 MG/1
10 TABLET ORAL DAILY
Qty: 90 TAB | Refills: 3 | Status: SHIPPED | OUTPATIENT
Start: 2018-04-04

## 2018-04-04 RX ORDER — SCOLOPAMINE TRANSDERMAL SYSTEM 1 MG/1
1 PATCH, EXTENDED RELEASE TRANSDERMAL
Qty: 5 PATCH | Refills: 0 | Status: SHIPPED | OUTPATIENT
Start: 2018-04-04 | End: 2018-07-23 | Stop reason: ALTCHOICE

## 2018-04-04 NOTE — ACP (ADVANCE CARE PLANNING)
Discussed ACP with patient. Gave pt an ACP handout. Patient prefers to read it on her own. Accepts referral to [facility] team at this time. Staff message sent to RICK Mckoy RN.

## 2018-04-04 NOTE — PROGRESS NOTES
HISTORY OF PRESENT ILLNESS  Timmy White is a 38 y.o. female here for an annual physical exam    Agree with nurse note. Hypertensive pt with hypercholesterolemia, vit d deficiency, iron deficiency anemia, thrombocytosis, and elevated WBC presents to the office with a BP of 179/93. For BP, she takes Norvasc 10 mg daily and Lisinopril 40 mg daily, tolerating well. Patient denies vision changes, headaches, dizziness, chest pain, SOB, or swelling. She weighs 200 lbs, down 15 lbs since 07/2017. She is going to the gym twice weekly with her sister. She reports having a pap smear around 08/2015 but she prefers not to return to her GYN, Dr. Fabiola Hernandez. She has thick, large blood clots and cramping with her menses. This is unchanged since her D and D in 08/2015 with Dr. Tayo Machado. She has mammograms at [facility] and has her mammogram scheduled for around 04/16/18. Negative mammogram in 03/2017. She sees optometrist, Dr. Dick Navarro annually and plans to schedule her appt for this year. New Concerns    She is going on a cruise to the [facility] in 06/2018 x1.5 weeks and requests medication for motion sickness. She complains of burning with urination. The patient denies fever, chills, abdominal pain, blood in the urine, increased frequency, back pain or other associated symptoms. She complains of productive cough with yellow sputum, wheezing, itchy nose, and nasal congestion with yellow discharge x1 week. She is taking Zyrtec and Mucinex without relief. She uses her ProAir inhaler about 3x per week and she requests a refill. Patient denies fever, chills, ear pain, dizziness, post nasal drainage, sore throat, headache, itchy or watery eyes, chest pain or tightness, SOB, GI symptoms, bladder symptoms and body aches. Written by gilda Carson, as dictated by DO. BEATRIZ Walker    Review of Systems is negative except as mentioned above in HPI.     ALLERGIES: Allergies   Allergen Reactions    Bactrim [Sulfamethoxazole-Trimethoprim] Other (comments)     GI Upset       CURRENT MEDICATIONS:    Outpatient Prescriptions Marked as Taking for the 4/4/18 encounter (Office Visit) with Hilda Arreola DO   Medication Sig Dispense Refill    PROAIR HFA 90 mcg/actuation inhaler Take 2 Puffs by inhalation every four (4) hours as needed for Wheezing. Indications: BRONCHOSPASM PREVENTION 1 Inhaler 1    cetirizine (ZYRTEC) 10 mg tablet Take  by mouth.  guaiFENesin ER (MUCINEX) 600 mg ER tablet Take 600 mg by mouth two (2) times a day.  lisinopril (PRINIVIL, ZESTRIL) 40 mg tablet Take 1.5 Tabs by mouth daily. Indications: hypertension 45 Tab 11    scopolamine (TRANSDERM-SCOP) 1 mg over 3 days pt3d 1 Patch by TransDERmal route every seventy-two (72) hours. Indications: PREVENTION OF MOTION SICKNESS 5 Patch 0    montelukast (SINGULAIR) 10 mg tablet Take 1 Tab by mouth daily. Indications: Allergic Rhinitis 90 Tab 3    mometasone (ASMANEX TWISTHALER) 220 mcg (30 doses) inhaler Take 1 Puff by inhalation two (2) times a day. Indications: MAINTENANCE THERAPY FOR ASTHMA 30 Cap 5    amLODIPine (NORVASC) 10 mg tablet Take 1 Tab by mouth daily. Indications: hypertension 30 Tab 5       PAST MEDICAL HISTORY:    Past Medical History:   Diagnosis Date    Allergy, unspecified not elsewhere classified     Anemia     Ankle sprain 03/2009    bilateral.  Dr. Whalen Peak Asthma childhood    Breast mass 5/5/09    Left. benign.  Chickenpox childhood    Ectopic pregnancy 04/2010    Right. Dr. Sandra Chapman hypertension, benign     Menorrhagia 08/2015    Dr. Giron Sports Ovarian cyst 2011    R.   Dr. Marci Cuevas Pure hypercholesterolemia 05/2009    Thrombocytosis ([address] 05/2009    Tubal pregnancy 06/2011    Dr Jojo Maravilla     Vitamin D deficiency 05/2009       PAST SURGICAL HISTORY:    Past Surgical History:   Procedure Laterality Date    HX DILATION AND CURETTAGE  8/11/15    Dr. Srinivasan Tejada. due to heavy vag bleeding and thick endometrial stripe.  HX SALPINGO-OOPHORECTOMY Right 04/2010    Right. due to ectopic pregnancy. Dr. Rommel Espitia.  HX TUBAL LIGATION  2002    Dr. Chance Warren HISTORY:    Family History   Problem Relation Age of Onset    Diabetes Mother      type ll    Hypertension Mother     Diabetes Father      type ll       SOCIAL HISTORY:    Social History     Social History    Marital status:      Spouse name: N/A    Number of children: N/A    Years of education: N/A     Social History Main Topics    Smoking status: Never Smoker    Smokeless tobacco: Never Used    Alcohol use No    Drug use: No    Sexual activity: Yes     Partners: Male     Birth control/ protection: Surgical      Comment: TL     Other Topics Concern    None     Social History Narrative       IMMUNIZATIONS:    There is no immunization history on file for this patient. PHYSICAL EXAMINATION    Vital signs     Visit Vitals    BP (!) 179/93 (BP 1 Location: Left arm, BP Patient Position: Sitting)    Pulse 92    Temp 98 °F (36.7 °C) (Oral)    Resp 12    Ht 5' 2\" (1.575 m)    Wt 200 lb 8 oz (90.9 kg)    LMP 03/21/2018 (Exact Date)    SpO2 100%    BMI 36.67 kg/m2        General appearance - Well nourished. Well appearing. Well developed. No acute distress. Obese. Head - Normocephalic. Atraumatic. Non tender sinuses x 4. Eyes - pupils equal and reactive, extraocular eye movements intact, sclera anicteric. Mildly injected sclera. Ears - Hearing is grossly normal bilaterally. Bilateral TM's intact. External ear canals normal without evidence of blood or swelling. Nose - normal and patent. No erythema or turbinate edema. No discharge. No polyps. Mouth - mucous membranes with adequate moisture. Posterior pharynx normal without lesions, white exudate, or obstruction. Neck - supple. Midline trachea.   No carotid bruits are noted. No thyromegaly noted. Neck is supple without rigidity. Chest - clear to auscultation bilaterally anterriorly and posteriorly. No wheezes, rales or rhonchi. Breath sounds are symmetrical bilaterally. Unlabored respirations. Heart - normal rate. Regular rhythm. Normal S1, S2. No murmurs. No rubs, clicks or gallops noted. Abdomen - soft and distended. No masses or organomegaly. No rebound, rigidity or guarding. Bowel sounds normal x 4 quadrants. Suprapubic tenderness noted. Back exam - normal range of motion. No pain on palpation of the spinous processes in the cervical, thoracic, lumbar, sacral regions. No CVA tenderness. Neurological - awake, alert and oriented to person, place, and time and event. Cranial nerves II through XII intact. No focal findings. Clear speech. Muscle strength is +5/5 x 4 extremities. Sensation is intact to light touch bilaterally. Steady gait. Musculoskeletal - Intact x 4 extremities. Full ROM x 4 extremities. No pain with movement. No pain on palpation of the bilateral shoulders, elbows, wrists, hands. No tenderness in the pelvis, pubic bone, bilateral hips, knees, ankles. No obvious deformity  Heme/Lymph - peripheral pulses normal x 4 extremities. No peripheral edema is noted. No cervical adenopathy noted. Skin - no rashes, erythema, ecchymosis, lacerations, abrasions, suspicious moles  Psychological -   normal behavior, dress and thought processes. Good insight. Good eye contact. Normal affect. Appropriate mood. Normal speech.       DATA REVIEWED    Lab Results   Component Value Date/Time    WBC 11.8 (H) 07/17/2017 01:43 PM    HGB 9.6 (L) 07/17/2017 01:43 PM    HCT 33.0 (L) 07/17/2017 01:43 PM    PLATELET 201 (H) 47/19/2778 01:43 PM    MCV 69.3 (L) 07/17/2017 01:43 PM     Lab Results   Component Value Date/Time    Sodium 137 07/17/2017 01:43 PM    Potassium 3.2 (L) 07/17/2017 01:43 PM    Chloride 101 07/17/2017 01:43 PM    CO2 29 07/17/2017 01:43 PM    Anion gap 7 07/17/2017 01:43 PM    Glucose 135 (H) 07/17/2017 01:43 PM    BUN 8 07/17/2017 01:43 PM    Creatinine 1.02 07/17/2017 01:43 PM    BUN/Creatinine ratio 8 (L) 07/17/2017 01:43 PM    GFR est AA >60 07/17/2017 01:43 PM    GFR est non-AA 59 (L) 07/17/2017 01:43 PM    Calcium 8.7 07/17/2017 01:43 PM    Bilirubin, total 0.1 (L) 07/17/2017 01:43 PM    AST (SGOT) 15 07/17/2017 01:43 PM    Alk. phosphatase 111 07/17/2017 01:43 PM    Protein, total 8.5 (H) 07/17/2017 01:43 PM    Albumin 3.2 (L) 07/17/2017 01:43 PM    Globulin 5.3 (H) 07/17/2017 01:43 PM    A-G Ratio 0.6 (L) 07/17/2017 01:43 PM    ALT (SGPT) 24 07/17/2017 01:43 PM     Lab Results   Component Value Date/Time    Cholesterol, total 205 (H) 03/29/2017 08:28 AM    HDL Cholesterol 77 03/29/2017 08:28 AM    LDL, calculated 118 (H) 03/29/2017 08:28 AM    VLDL, calculated 10 03/29/2017 08:28 AM    Triglyceride 51 03/29/2017 08:28 AM     Lab Results   Component Value Date/Time    VITAMIN D, 25-HYDROXY 8.8 (L) 03/29/2017 08:28 AM       Lab Results   Component Value Date/Time    Hemoglobin A1c 5.6 08/27/2015 08:56 AM     Lab Results   Component Value Date/Time    TSH 0.872 03/29/2017 08:28 AM     Lab Results   Component Value Date/Time    Microalb/Creat ratio (ug/mg creat.) 31.0 (H) 08/27/2015 09:09 AM       ASSESSMENT and PLAN    ICD-10-CM ICD-9-CM    1. Well woman exam without gynecological exam Z00.00 V70.0    2. Essential hypertension, benign I10 401.1 LIPID PANEL      METABOLIC PANEL, COMPREHENSIVE      URINALYSIS W/ RFLX MICROSCOPIC      MICROALBUMIN, UR, RAND W/ MICROALB/CREAT RATIO      lisinopril (PRINIVIL, ZESTRIL) 40 mg tablet   3. Acute nasopharyngitis J00 460 guaiFENesin ER (MUCINEX) 600 mg ER tablet   4. Dysuria R30.0 788. 1 CULTURE, URINE   5. Mild intermittent asthma with acute exacerbation J45.21 493.92 mometasone (ASMANEX TWISTHALER) 220 mcg (30 doses) inhaler    due to allergies, URI   6. Thrombocytosis (HCC) D47.3 238.71 CBC W/O DIFF   7. Other elevated white blood cell (WBC) count D72.828 288.69 CBC W/O DIFF   8. Seasonal allergic rhinitis due to other allergic trigger J30.89 477.8 cetirizine (ZYRTEC) 10 mg tablet      montelukast (SINGULAIR) 10 mg tablet   9. Motion sickness, initial encounter T75. 3XXA 994.6 scopolamine (TRANSDERM-SCOP) 1 mg over 3 days pt3d   10. Vitamin D deficiency E55.9 268.9 VITAMIN D, 25 HYDROXY   11. Other iron deficiency anemia D50.8 280.8 CBC W/O DIFF      TSH 3RD GENERATION   12. Pure hypercholesterolemia E78.00 272.0 LIPID PANEL   13. RAD (reactive airway disease) with wheezing, mild intermittent, uncomplicated I36.19 873.40 PROAIR HFA 90 mcg/actuation inhaler   14. Weight loss R63.4 783.21     15# since 2017 due to increased exercise   15. DUB (dysfunctional uterine bleeding) N93.8 626.8 REFERRAL TO OBSTETRICS AND GYNECOLOGY    with large clots, unchanged since D and C   16. Need for Tdap vaccination Z23 V06.1      See ophthalmologist every 2 to 3 years unless otherwise directed. See dentist every 6 months. See dermatologist for annual check. Pt was advised that new acute care problems during a CPE could generate a second bill with their insurance. She is agreeable. Continue current medications. Increase Lisinopril from 40 mg daily to 60 mg daily, pt instructed to take 1.5 tabs of Lisinopril 40 mg daily. Continue with Norvasc. Start Singulair 10 mg nightly; informed pt this is free a [employer]. Take Asmanex 220 mcg 1 puff BIG. Prescriptions written and sent to pharmacist or given to patient. Singulair 10 mg. Scopolamine patches. Asmanex 220 mcg. Chart reviewed and updated. Specialist notes reviewed and appreciated. Get ov notes from Dr. Gita Carlson. Advised pt to sign medical release form at check out today. Referrals given. Keep appointments with specialists. GYN. Discussed recent test results and goals with patient.   Recheck pertinent labs when fasting. Recent PAP and mammograms reviewed. Recheck as instructed by specialists. Immunizations are noted. Administered TDAP vaccine today. Advised balanced diet and exercise. Proper rest.  Increase water and fiber. Avoid tobacco.  Decrease alcohol and caffeine. Decrease carbohydrates, increase green leafy vegetables and protein. Increase water intake. Eat 3-5 small meals daily. Increase physical activity. Relevant handouts provided and discussed with pt. Counselled pt on:  Patient health concerns. BP, allergies, asthma, and advance care planning. Discussed the patient's BMI with her. The BMI follow up plan is as follows: I have counseled this patient on diet and exercise regimens. Follow-up Disposition:  Return in about 3 months (around 7/4/2018) for bp, results, referral follow up. Patient was offered a choice/choices in the treatment plan today. Patient expresses understanding of the plan and agrees with recommendations. Written by gilda Chavarria, as dictated by Dr. Justin Robbins DO. Documentation True and Accepted by April Simeon. Patient Instructions          Well Visit, Ages 25 to 48: Care Instructions  Your Care Instructions    Physical exams can help you stay healthy. Your doctor has checked your overall health and may have suggested ways to take good care of yourself. He or she also may have recommended tests. At home, you can help prevent illness with healthy eating, regular exercise, and other steps. Follow-up care is a key part of your treatment and safety. Be sure to make and go to all appointments, and call your doctor if you are having problems. It's also a good idea to know your test results and keep a list of the medicines you take. How can you care for yourself at home? · Reach and stay at a healthy weight. This will lower your risk for many problems, such as obesity, diabetes, heart disease, and high blood pressure.   · Get at least 27 minutes of physical activity on most days of the week. Walking is a good choice. You also may want to do other activities, such as running, swimming, cycling, or playing tennis or team sports. Discuss any changes in your exercise program with your doctor. · Do not smoke or allow others to smoke around you. If you need help quitting, talk to your doctor about stop-smoking programs and medicines. These can increase your chances of quitting for good. · Talk to your doctor about whether you have any risk factors for sexually transmitted infections (STIs). Having one sex partner (who does not have STIs and does not have sex with anyone else) is a good way to avoid these infections. · Use birth control if you do not want to have children at this time. Talk with your doctor about the choices available and what might be best for you. · Protect your skin from too much sun. When you're outdoors from 10 a.m. to 4 p.m., stay in the shade or cover up with clothing and a hat with a wide brim. Wear sunglasses that block UV rays. Even when it's cloudy, put broad-spectrum sunscreen (SPF 30 or higher) on any exposed skin. · See a dentist one or two times a year for checkups and to have your teeth cleaned. · Wear a seat belt in the car. · Drink alcohol in moderation, if at all. That means no more than 2 drinks a day for men and 1 drink a day for women. Follow your doctor's advice about when to have certain tests. These tests can spot problems early. For everyone  · Cholesterol. Have the fat (cholesterol) in your blood tested after age 21. Your doctor will tell you how often to have this done based on your age, family history, or other things that can increase your risk for heart disease. · Blood pressure. Have your blood pressure checked during a routine doctor visit. Your doctor will tell you how often to check your blood pressure based on your age, your blood pressure results, and other factors. · Vision.  Talk with your doctor about how often to have a glaucoma test.  · Diabetes. Ask your doctor whether you should have tests for diabetes. · Colon cancer. Have a test for colon cancer at age 48. You may have one of several tests. If you are younger than 48, you may need a test earlier if you have any risk factors. Risk factors include whether you already had a precancerous polyp removed from your colon or whether your parent, brother, sister, or child has had colon cancer. For women  · Breast exam and mammogram. Talk to your doctor about when you should have a clinical breast exam and a mammogram. Medical experts differ on whether and how often women under 50 should have these tests. Your doctor can help you decide what is right for you. · Pap test and pelvic exam. Begin Pap tests at age 24. A Pap test is the best way to find cervical cancer. The test often is part of a pelvic exam. Ask how often to have this test.  · Tests for sexually transmitted infections (STIs). Ask whether you should have tests for STIs. You may be at risk if you have sex with more than one person, especially if your partners do not wear condoms. For men  · Tests for sexually transmitted infections (STIs). Ask whether you should have tests for STIs. You may be at risk if you have sex with more than one person, especially if you do not wear a condom. · Testicular cancer exam. Ask your doctor whether you should check your testicles regularly. · Prostate exam. Talk to your doctor about whether you should have a blood test (called a PSA test) for prostate cancer. Experts differ on whether and when men should have this test. Some experts suggest it if you are older than 39 and are -American or have a father or brother who got prostate cancer when he was younger than 72. When should you call for help? Watch closely for changes in your health, and be sure to contact your doctor if you have any problems or symptoms that concern you.   Where can you learn more?  Go to http://tl-aguilar.info/. Enter P072 in the search box to learn more about \"Well Visit, Ages 25 to 48: Care Instructions. \"  Current as of: May 12, 2017  Content Version: 11.4  © 4193-2796 [employer]. Care instructions adapted under license by [employer] (which disclaims liability or warranty for this information). If you have questions about a medical condition or this instruction, always ask your healthcare professional. [place] [address] any warranty or liability for your use of this information. Advance Care Planning: Care Instructions  Your Care Instructions    It can be hard to live with an illness that cannot be cured. But if your health is getting worse, you may want to make decisions about end-of-life care. Planning for the end of your life does not mean that you are giving up. It is a way to make sure that your wishes are met. Clearly stating your wishes can make it easier for your loved ones. Making plans while you are still able may also ease your mind and make your final days less stressful and more meaningful. Follow-up care is a key part of your treatment and safety. Be sure to make and go to all appointments, and call your doctor if you are having problems. It's also a good idea to know your test results and keep a list of the medicines you take. What can you do to plan for the end of life? · You can bring these issues up with your doctor. You do not need to wait until your doctor starts the conversation. You might start with \"I would not be willing to live with . Anastasiia Oriental Anastasiia Oriental Anastasiia Yvette \" When you complete this sentence it helps your doctor understand your wishes. · Talk openly and honestly with your doctor. This is the best way to understand the decisions you will need to make as your health changes. Know that you can always change your mind. · Ask your doctor about commonly used life-support measures.  These include tube feedings, breathing machines, and fluids given through a vein (IV). Understanding these treatments will help you decide whether you want them. · You may choose to have these life-supporting treatments for a limited time. This allows a trial period to see whether they will help you. You may also decide that you want your doctor to take only certain measures to keep you alive. It is important to spell out these conditions so that your doctor and family understand them. · Talk to your doctor about how long you are likely to live. He or she may be able to give you an idea of what usually happens with your specific illness. · Think about preparing papers that state your wishes. This way there will not be any confusion about what you want. You can change your instructions at any time. Which papers should you prepare? Advance directives are legal papers that tell doctors how you want to be cared for at the end of your life. You do not need a  to write these papers. Ask your doctor or your state [facility] department for information on how to write your advance directives. They may have the forms for each of these types of papers. Make sure your doctor has a copy of these on file, and give a copy to a family member or close friend. · Consider a do-not-resuscitate order (DNR). This order asks that no extra treatments be done if your heart stops or you stop breathing. Extra treatments may include cardiopulmonary resuscitation (CPR), electrical shock to restart your heart, or a machine to breathe for you. If you decide to have a DNR order, ask your doctor to explain and write it. Place the order in your home where everyone can easily see it. · Consider a living will. A living will explains your wishes about life support and other treatments at the end of your life if you become unable to speak for yourself. Living lester tell doctors to use or not use treatments that would keep you alive.  You must have one or two witnesses or a notary present when you sign this form. · Consider a durable power of  for health care. This allows you to name a person to make decisions about your care if you are not able to. Most people ask a close friend or family member. Talk to this person about the kinds of treatments you want and those that you do not want. Make sure this person understands your wishes. These legal papers are simple to change. Tell your doctor what you want to change, and ask him or her to make a note in your medical file. Give your family updated copies of the papers. Where can you learn more? Go to http://tl-aguilar.info/. Enter P184 in the search box to learn more about \"Advance Care Planning: Care Instructions. \"  Current as of: September 24, 2016  Content Version: 11.4  © 3032-2009 [employer]. Care instructions adapted under license by [employer] (which disclaims liability or warranty for this information). If you have questions about a medical condition or this instruction, always ask your healthcare professional. [place] [address] any warranty or liability for your use of this information. DASH Diet: Care Instructions  Your Care Instructions    The DASH diet is an eating plan that can help lower your blood pressure. DASH stands for Dietary Approaches to Stop Hypertension. Hypertension is high blood pressure. The DASH diet focuses on eating foods that are high in calcium, potassium, and magnesium. These nutrients can lower blood pressure. The foods that are highest in these nutrients are fruits, vegetables, low-fat dairy products, nuts, seeds, and legumes. But taking calcium, potassium, and magnesium supplements instead of eating foods that are high in those nutrients does not have the same effect. The DASH diet also includes whole grains, fish, and poultry.   The DASH diet is one of several lifestyle changes your doctor may recommend to lower your high blood pressure. Your doctor may also want you to decrease the amount of sodium in your diet. Lowering sodium while following the DASH diet can lower blood pressure even further than just the DASH diet alone. Follow-up care is a key part of your treatment and safety. Be sure to make and go to all appointments, and call your doctor if you are having problems. It's also a good idea to know your test results and keep a list of the medicines you take. How can you care for yourself at home? Following the DASH diet  · Eat 4 to 5 servings of fruit each day. A serving is 1 medium-sized piece of fruit, ½ cup chopped or canned fruit, 1/4 cup dried fruit, or 4 ounces (½ cup) of fruit juice. Choose fruit more often than fruit juice. · Eat 4 to 5 servings of vegetables each day. A serving is 1 cup of lettuce or raw leafy vegetables, ½ cup of chopped or cooked vegetables, or 4 ounces (½ cup) of vegetable juice. Choose vegetables more often than vegetable juice. · Get 2 to 3 servings of low-fat and fat-free dairy each day. A serving is 8 ounces of milk, 1 cup of yogurt, or 1 ½ ounces of cheese. · Eat 6 to 8 servings of grains each day. A serving is 1 slice of bread, 1 ounce of dry cereal, or ½ cup of cooked rice, pasta, or cooked cereal. Try to choose whole-grain products as much as possible. · Limit lean meat, poultry, and fish to 2 servings each day. A serving is 3 ounces, about the size of a deck of cards. · Eat 4 to 5 servings of nuts, seeds, and legumes (cooked dried beans, lentils, and split peas) each week. A serving is 1/3 cup of nuts, 2 tablespoons of seeds, or ½ cup of cooked beans or peas. · Limit fats and oils to 2 to 3 servings each day. A serving is 1 teaspoon of vegetable oil or 2 tablespoons of salad dressing. · Limit sweets and added sugars to 5 servings or less a week. A serving is 1 tablespoon jelly or jam, ½ cup sorbet, or 1 cup of lemonade. · Eat less than 2,300 milligrams (mg) of sodium a day.  If you limit your sodium to 1,500 mg a day, you can lower your blood pressure even more. Tips for success  · Start small. Do not try to make dramatic changes to your diet all at once. You might feel that you are missing out on your favorite foods and then be more likely to not follow the plan. Make small changes, and stick with them. Once those changes become habit, add a few more changes. · Try some of the following:  ¨ Make it a goal to eat a fruit or vegetable at every meal and at snacks. This will make it easy to get the recommended amount of fruits and vegetables each day. ¨ Try yogurt topped with fruit and nuts for a snack or healthy dessert. ¨ Add lettuce, tomato, cucumber, and onion to sandwiches. ¨ Combine a ready-made pizza crust with low-fat mozzarella cheese and lots of vegetable toppings. Try using tomatoes, squash, spinach, broccoli, carrots, cauliflower, and onions. ¨ Have a variety of cut-up vegetables with a low-fat dip as an appetizer instead of chips and dip. ¨ Sprinkle sunflower seeds or chopped almonds over salads. Or try adding chopped walnuts or almonds to cooked vegetables. ¨ Try some vegetarian meals using beans and peas. Add garbanzo or kidney beans to salads. Make burritos and tacos with mashed glynn beans or black beans. Where can you learn more? Go to http://tl-aguilar.info/. Enter R679 in the search box to learn more about \"DASH Diet: Care Instructions. \"  Current as of: September 21, 2016  Content Version: 11.4  © 8570-2433 [employer] ?. Care instructions adapted under license by [employer] (which disclaims liability or warranty for this information). If you have questions about a medical condition or this instruction, always ask your healthcare professional. [place] [address] any warranty or liability for your use of this information.        Starting a Weight Loss Plan: Care Instructions  Your Care Instructions    If you are thinking about losing weight, it can be hard to know where to start. Your doctor can help you set up a weight loss plan that best meets your needs. You may want to take a class on nutrition or exercise, or join a weight loss support group. If you have questions about how to make changes to your eating or exercise habits, ask your doctor about seeing a registered dietitian or an exercise specialist.  It can be a big challenge to lose weight. But you do not have to make huge changes at once. Make small changes, and stick with them. When those changes become habit, add a few more changes. If you do not think you are ready to make changes right now, try to pick a date in the future. Make an appointment to see your doctor to discuss whether the time is right for you to start a plan. Follow-up care is a key part of your treatment and safety. Be sure to make and go to all appointments, and call your doctor if you are having problems. It's also a good idea to know your test results and keep a list of the medicines you take. How can you care for yourself at home? · Set realistic goals. Many people expect to lose much more weight than is likely. A weight loss of 5% to 10% of your body weight may be enough to improve your health. · Get family and friends involved to provide support. Talk to them about why you are trying to lose weight, and ask them to help. They can help by participating in exercise and having meals with you, even if they may be eating something different. · Find what works best for you. If you do not have time or do not like to cook, a program that offers meal replacement bars or shakes may be better for you. Or if you like to prepare meals, finding a plan that includes daily menus and recipes may be best.  · Ask your doctor about other health professionals who can help you achieve your weight loss goals. ¨ A dietitian can help you make healthy changes in your diet.   ¨ An exercise specialist or  can help you develop a safe and effective exercise program.  ¨ A counselor or psychiatrist can help you cope with issues such as depression, anxiety, or family problems that can make it hard to focus on weight loss. · Consider joining a support group for people who are trying to lose weight. Your doctor can suggest groups in your area. Where can you learn more? Go to http://tl-aguilar.info/. Enter X199 in the search box to learn more about \"Starting a Weight Loss Plan: Care Instructions. \"  Current as of: October 13, 2016  Content Version: 11.4  © 2370-4577 [employer] Care instructions adapted under license by [employer] (which disclaims liability or warranty for this information). If you have questions about a medical condition or this instruction, always ask your healthcare professional. [address] any warranty or liability for your use of this information. WEIGHT LOSS PLAN    1. Read food labels and count calories. Goal 7985-2626 calories daily for women and 0067-1013 calories daily for men. Achieve this by decreasing caloric intake by 500 calories by weekly increments. NOTE:  1 pound = 3500 calories! Www.loseit. com  Www.myfitnesspal.com  Www.[employer]. [employer]  Www.RFMicronfinder. gov  Weight watchers mobile    Calories needed to lose 1-2 pounds a week = 10 x your weight in pounds    2. Increase water intake. Per [facility], it is important to drink 1/2 your body weight in ounces of water daily. Decrease your water consumption 2-3 hours before bedtime to prevent sleep disturbance from frequent urination. 3.  Decrease sugary beverages. Each can or glass of soda increases your risk of obesity by 60%. Can lose 10 pounds in a month by avoiding any soda.      12 oz can of soda = 140 calories, 16 oz cup of sweet tea = 200 calories    16 oz orange juice = 200 calories, 10 oz apple juice = 150 calories   32 oz sports drink = 200 calories, 16 oz punch = 240 calories   3.5 oz alcohol = 100-150 calories     4. Avoid High Fructose Corn Syrup products. This ingredient makes products highly addictive! 5. Exercise 30 minutes daily 5 days weekly, minimally. If you burn 3500 calories that equals a pound! Use a pedometer to count steps. Visit www. [employer] for a free pedometer and diet recommendations. Your maximum heart rate = 220 - your age    Never exercise at your maximum heart rate. See handout for target heart rate. 6.  Decrease carbohydrates (white bread, pasta, rice, potatoes, sweet foods and sweet drinks like soda, tea, coffee, juice and sports drinks). Increase fiber and protein. Goal:   calories daily of carbohydrates     Try CHROMIUM PICONATE 200 MG THREE TIMES DAILY,    to decrease Premenstrual carbohydrate cravings. 7.  Eat 3-5 small meals daily, include lots of protein (beans/legumes, nuts, lean meat, eggs) and green vegetables with each. (Breakfast, lunch, dinner with 2 healthy snacks)    8. Get proper rest 7-8 hours uninterrupted. When you get less than 6 hours, it triggers hunger by affecting your [place]:Leptin ratio and this results in weight gain. 9.  Watch your food portions. Green leafy vegetables should cover 1/2 of the plate, lean meat 1/4 of the plate, starchy vegetable 1/4 of the plate. Use smaller plates. 10.  Do not eat until you are full. Eat until you are no longer hungry. If you are not sure, try drinking a glass of water before getting your second serving of food. 11.  Do not weigh yourself daily. Wait until your next office visit. Use how you feel and  how your clothes fit as measurements of success. 12.  Address your spirituality to draw strength from above during your journey. Remember \"I am fearfully and wonderfully made. Marvelous in His eyes. \"    13.   Set realistic, appropriate and achievable weight loss goals:     RECOMMENDED TARGET WEIGHT LOSS:  Initial weight loss of 5-10% of your initial body weight achieved over 6 months or a decrease of 2 BMI units. MINIMUM GOAL OF WEIGHT LOSS:  Reduce body weight and maintain a lower body weight. Prevent weight gain. RELATED WEBSITES:      Www.obesityaction. org  (and consider joining the Obesity Action Coalition for $25/year. The [facility] mission is to elevated and empower those affected by obesity through education, advocacy and support. Quarterly journals included in membership fee. )    Www.Compass  www.TRAFI.com     RELATED DIETS:  Dr. Magda Mendoza Weight loss challenge, Mediterranean diet, [address] [place] [address], [facility] Watchers, Paleo Diet (anti-inflammatory diet)      EXERCISE 150 MINUTES WEEKLY. THIS CAN BE ACHIEVED BY WORKING OUT OR WALKING A MINIMUM OF 30 MINUTES FOR 5 DAYS WEEKLY. YOU CAN EXERCISE IN INCREMENTS OF 10-15 MINUTES UP TO 3 TIMES A DAY. Consider performing \"brainless exercise. \"  Choose your favorite tv program.  Five minutes before and for 5 minutes after the tv program, stretch your body. While the program is on, walk in place watching the show. When commercials come on, rest or walk around the house to do other things. When the program begins, return to walking in place. When you are able keep walking during the commercials and add light weights to your ankles or hands. By the end of the show, you would have walked 30 minutes. If you need shorter spurts of exercise, walk during the commercials and rest during the show. Drink to glasses of water prior to any exercise to prevent dehydration and to improve the results of the work out. Your RESTING HEART RATE is the number of times your heart beats per minute when you are not exerting yourself. The more fit you are, the lower your resting heart rate will be.     Your MAXIMUM HEART RATE is the number of times per minute your heart pumps when it is working at 100% capacity. NEVER EXERCISE AT YOUR MAXIMUM HEART RATE. MAX HEART RATE = 220 - your age    For example, in a 48year old, the maximum heart rate is 220-50 = 170 beats per minute    Your [place] is the number of beats per minute our heart should pump during aerobic exercise. Reaching your target heart rate indicates that your body is receiving maximum cardiovascular and fat burning benefits. If you are fit, your TARGET HEART RATE = 70-85% of your maximum Heart rate      For a 48year old with vigorous intensity physical activity,         Target heart rate= 170 bpm x .70 = 119 bpm     Target heart rate = 170 bpm x .85=  145 bpm        Therefore, your target heart rate during physical activity is 119-145      If you are not fit, TARGET HEART RATE = 50-70% of your maximum Heart rate    MODERATE CONSISTENT AEROBIC EXERCISE OR WALKING FOR AT LEAST 150 MINUTES WEEKLY IS AN ESSENTIAL PART OF ANY WEIGHT LOSS OF WEIGHT MAINTENANCE PROGRAM.     During WEIGHT LOSS PHASE, at least 75% of your exercise needs to be walking or moderate aerobic activity and 25% or 0% can be Isometric or Resistance type exercises (the latter can be deferred to the weight maintenance phase.)     During WEIGHT MAINTENANCE PHASE, at least 50% of your exercise needs to be aerobic and 50% Isometric or Resistance type exercises. BENEFITS OF MODERATE INTENSITY EXERCISE MOST DAYS OF THE WEEK:     1. Insulin Resistance improves 30-85%   2. Abdominal Fat decreases by 30%   3. Inflammatory Markers decrease by 30% (therefore pain decreases)   4. Systolic and Diastolic Blood Pressure decrease by 4 mmHg   5. HDL improves by 5%   6. Triglycerides decrease by 15%   7. Shift from small dense LDL to large dense LDL (therefore decrease Insulin Resistance)   8.   Decrease coagulability                  Saline Nasal Washes: Care Instructions  Your Care Instructions  Saline nasal washes help keep the nasal passages open by washing out thick or dried mucus. This simple remedy can help relieve symptoms of allergies, sinusitis, and colds. It also can make the nose feel more comfortable by keeping the mucous membranes moist. You may notice a little burning sensation in your nose the first few times you use the solution, but this usually gets better in a few days. Follow-up care is a key part of your treatment and safety. Be sure to make and go to all appointments, and call your doctor if you are having problems. It's also a good idea to know your test results and keep a list of the medicines you take. How can you care for yourself at home? · You can buy premixed saline solution in a squeeze bottle or other sinus rinse products at a drugstore. Read and follow the instructions on the label. · You also can make your own saline solution by adding 1 teaspoon of salt and 1 teaspoon of baking soda to 2 cups of distilled water. · If you use a homemade solution, pour a small amount into a clean bowl. Using a rubber bulb syringe, squeeze the syringe and place the tip in the salt water. Pull a small amount of the salt water into the syringe by relaxing your hand. · Sit down with your head tilted slightly back. Do not lie down. Put the tip of the bulb syringe or the squeeze bottle a little way into one of your nostrils. Gently drip or squirt a few drops into the nostril. Repeat with the other nostril. Some sneezing and gagging are normal at first.  · Gently blow your nose. · Wipe the syringe or bottle tip clean after each use. · Repeat this 2 or 3 times a day. · Use nasal washes gently if you have nosebleeds often. When should you call for help? Watch closely for changes in your health, and be sure to contact your doctor if:  ? · You often get nosebleeds. ? · You have problems doing the nasal washes. Where can you learn more? Go to http://tl-aguilar.info/.   Enter [id number] in the search box to learn more about \"Saline Nasal Washes: Care Instructions. \"  Current as of: May 12, 2017  Content Version: 11.4  © 1275-8804 [employer], GOOD. Care instructions adapted under license by [employer] (which disclaims liability or warranty for this information). If you have questions about a medical condition or this instruction, always ask your healthcare professional. [place] [address] any warranty or liability for your use of this information.

## 2018-04-04 NOTE — PROGRESS NOTES
Zainab Jacobo  Identified pt with two pt identifiers(name and ). Chief Complaint   Patient presents with    Complete Physical       1. Have you been to the ER, urgent care clinic since your last visit? Hospitalized since your last visit? No    2. Have you seen or consulted any other health care providers outside of the [address] [place] [address] since your last visit? Include any pap smears or colon screening. No    In the event something were to happen to you and you were unable to speak on your behalf, do you have an Advance Directive/ Living Will in place stating your wishes? NO    If yes, do we have a copy on file NO    If no, would you like information:  Pt accepted. Medication reconciliation up to date and corrected with patient at this time. Today's provider has been notified of reason for visit, vitals and flowsheets obtained on patients. Reviewed record in preparation for visit, huddled with provider and have obtained necessary documentation.       Health Maintenance Due   Topic    Influenza Age 5 to Adult     Pneumococcal 19-64 Highest Risk (2 of 3 - PCV13)       Wt Readings from Last 3 Encounters:   18 200 lb 8 oz (90.9 kg)   17 215 lb 3.2 oz (97.6 kg)   17 215 lb 6.4 oz (97.7 kg)     Temp Readings from Last 3 Encounters:   17 98.1 °F (36.7 °C)   17 98.5 °F (36.9 °C) (Oral)   17 98.6 °F (37 °C) (Oral)     BP Readings from Last 3 Encounters:   17 151/85   17 147/88   17 140/84     Pulse Readings from Last 3 Encounters:   17 89   17 93   17 98     Vitals:    18 1124   Resp: 12   Weight: 200 lb 8 oz (90.9 kg)   Height: 5' 2\" (1.575 m)   PainSc:   0 - No pain   LMP: 2018         Learning Assessment:  :     Learning Assessment 2018   PRIMARY LEARNER Patient   PRIMARY LANGUAGE ENGLISH   LEARNER PREFERENCE PRIMARY DEMONSTRATION   ANSWERED BY Patient   RELATIONSHIP SELF       Depression Screening:  : PHQ over the last two weeks 6/23/2015   Little interest or pleasure in doing things Not at all   Feeling down, depressed or hopeless Not at all   Total Score PHQ 2 0       Fall Risk Assessment:  :     Fall Risk Assessment, last 12 mths 4/4/2018   Able to walk? Yes   Fall in past 12 months? No       Abuse Screening:  :     Abuse Screening Questionnaire 4/4/2018   Do you ever feel afraid of your partner? N   Are you in a relationship with someone who physically or mentally threatens you? N   Is it safe for you to go home?  Y       ADL Screening:  :     ADL Assessment 4/4/2018   Feeding yourself No Help Needed   Getting from bed to chair No Help Needed   Getting dressed No Help Needed   Bathing or showering No Help Needed   Walk across the room (includes cane/walker) No Help Needed   Using the telphone No Help Needed   Taking your medications No Help Needed   Preparing meals No Help Needed   Managing money (expenses/bills) No Help Needed   Moderately strenuous housework (laundry) No Help Needed   Shopping for personal items (toiletries/medicines) No Help Needed   Shopping for groceries No Help Needed   Driving No Help Needed   Climbing a flight of stairs No Help Needed   Getting to places beyond walking distances No Help Needed

## 2018-04-04 NOTE — PATIENT INSTRUCTIONS
Well Visit, Ages 25 to 48: Care Instructions  Your Care Instructions    Physical exams can help you stay healthy. Your doctor has checked your overall health and may have suggested ways to take good care of yourself. He or she also may have recommended tests. At home, you can help prevent illness with healthy eating, regular exercise, and other steps. Follow-up care is a key part of your treatment and safety. Be sure to make and go to all appointments, and call your doctor if you are having problems. It's also a good idea to know your test results and keep a list of the medicines you take. How can you care for yourself at home? · Reach and stay at a healthy weight. This will lower your risk for many problems, such as obesity, diabetes, heart disease, and high blood pressure. · Get at least 30 minutes of physical activity on most days of the week. Walking is a good choice. You also may want to do other activities, such as running, swimming, cycling, or playing tennis or team sports. Discuss any changes in your exercise program with your doctor. · Do not smoke or allow others to smoke around you. If you need help quitting, talk to your doctor about stop-smoking programs and medicines. These can increase your chances of quitting for good. · Talk to your doctor about whether you have any risk factors for sexually transmitted infections (STIs). Having one sex partner (who does not have STIs and does not have sex with anyone else) is a good way to avoid these infections. · Use birth control if you do not want to have children at this time. Talk with your doctor about the choices available and what might be best for you. · Protect your skin from too much sun. When you're outdoors from 10 a.m. to 4 p.m., stay in the shade or cover up with clothing and a hat with a wide brim. Wear sunglasses that block UV rays. Even when it's cloudy, put broad-spectrum sunscreen (SPF 30 or higher) on any exposed skin.   · See a dentist one or two times a year for checkups and to have your teeth cleaned. · Wear a seat belt in the car. · Drink alcohol in moderation, if at all. That means no more than 2 drinks a day for men and 1 drink a day for women. Follow your doctor's advice about when to have certain tests. These tests can spot problems early. For everyone  · Cholesterol. Have the fat (cholesterol) in your blood tested after age 21. Your doctor will tell you how often to have this done based on your age, family history, or other things that can increase your risk for heart disease. · Blood pressure. Have your blood pressure checked during a routine doctor visit. Your doctor will tell you how often to check your blood pressure based on your age, your blood pressure results, and other factors. · Vision. Talk with your doctor about how often to have a glaucoma test.  · Diabetes. Ask your doctor whether you should have tests for diabetes. · Colon cancer. Have a test for colon cancer at age 48. You may have one of several tests. If you are younger than 48, you may need a test earlier if you have any risk factors. Risk factors include whether you already had a precancerous polyp removed from your colon or whether your parent, brother, sister, or child has had colon cancer. For women  · Breast exam and mammogram. Talk to your doctor about when you should have a clinical breast exam and a mammogram. Medical experts differ on whether and how often women under 50 should have these tests. Your doctor can help you decide what is right for you. · Pap test and pelvic exam. Begin Pap tests at age 24. A Pap test is the best way to find cervical cancer. The test often is part of a pelvic exam. Ask how often to have this test.  · Tests for sexually transmitted infections (STIs). Ask whether you should have tests for STIs. You may be at risk if you have sex with more than one person, especially if your partners do not wear condoms.   For men  · Tests for sexually transmitted infections (STIs). Ask whether you should have tests for STIs. You may be at risk if you have sex with more than one person, especially if you do not wear a condom. · Testicular cancer exam. Ask your doctor whether you should check your testicles regularly. · Prostate exam. Talk to your doctor about whether you should have a blood test (called a PSA test) for prostate cancer. Experts differ on whether and when men should have this test. Some experts suggest it if you are older than 39 and are -American or have a father or brother who got prostate cancer when he was younger than 72. When should you call for help? Watch closely for changes in your health, and be sure to contact your doctor if you have any problems or symptoms that concern you. Where can you learn more? Go to http://tl-aguilar.info/. Enter P072 in the search box to learn more about \"Well Visit, Ages 25 to 48: Care Instructions. \"  Current as of: May 12, 2017  Content Version: 11.4  © 6086-4986 [employer]. Care instructions adapted under license by [employer] (which disclaims liability or warranty for this information). If you have questions about a medical condition or this instruction, always ask your healthcare professional. [address] any warranty or liability for your use of this information. Advance Care Planning: Care Instructions  Your Care Instructions    It can be hard to live with an illness that cannot be cured. But if your health is getting worse, you may want to make decisions about end-of-life care. Planning for the end of your life does not mean that you are giving up. It is a way to make sure that your wishes are met. Clearly stating your wishes can make it easier for your loved ones.  Making plans while you are still able may also ease your mind and make your final days less stressful and more meaningful. Follow-up care is a key part of your treatment and safety. Be sure to make and go to all appointments, and call your doctor if you are having problems. It's also a good idea to know your test results and keep a list of the medicines you take. What can you do to plan for the end of life? · You can bring these issues up with your doctor. You do not need to wait until your doctor starts the conversation. You might start with \"I would not be willing to live with . Artist Hazy Artist Hazy Artist Hazy \" When you complete this sentence it helps your doctor understand your wishes. · Talk openly and honestly with your doctor. This is the best way to understand the decisions you will need to make as your health changes. Know that you can always change your mind. · Ask your doctor about commonly used life-support measures. These include tube feedings, breathing machines, and fluids given through a vein (IV). Understanding these treatments will help you decide whether you want them. · You may choose to have these life-supporting treatments for a limited time. This allows a trial period to see whether they will help you. You may also decide that you want your doctor to take only certain measures to keep you alive. It is important to spell out these conditions so that your doctor and family understand them. · Talk to your doctor about how long you are likely to live. He or she may be able to give you an idea of what usually happens with your specific illness. · Think about preparing papers that state your wishes. This way there will not be any confusion about what you want. You can change your instructions at any time. Which papers should you prepare? Advance directives are legal papers that tell doctors how you want to be cared for at the end of your life. You do not need a  to write these papers. Ask your doctor or your state health department for information on how to write your advance directives.  They may have the forms for each of these types of papers. Make sure your doctor has a copy of these on file, and give a copy to a family member or close friend. · Consider a do-not-resuscitate order (DNR). This order asks that no extra treatments be done if your heart stops or you stop breathing. Extra treatments may include cardiopulmonary resuscitation (CPR), electrical shock to restart your heart, or a machine to breathe for you. If you decide to have a DNR order, ask your doctor to explain and write it. Place the order in your home where everyone can easily see it. · Consider a living will. A living will explains your wishes about life support and other treatments at the end of your life if you become unable to speak for yourself. Living lester tell doctors to use or not use treatments that would keep you alive. You must have one or two witnesses or a notary present when you sign this form. · Consider a durable power of  for health care. This allows you to name a person to make decisions about your care if you are not able to. Most people ask a close friend or family member. Talk to this person about the kinds of treatments you want and those that you do not want. Make sure this person understands your wishes. These legal papers are simple to change. Tell your doctor what you want to change, and ask him or her to make a note in your medical file. Give your family updated copies of the papers. Where can you learn more? Go to http://tl-aguilar.info/. Enter P184 in the search box to learn more about \"Advance Care Planning: Care Instructions. \"  Current as of: September 24, 2016  Content Version: 11.4  © 4999-2831 [employer]. Care instructions adapted under license by [employer] (which disclaims liability or warranty for this information).  If you have questions about a medical condition or this instruction, always ask your healthcare professional. [address]  any warranty or liability for your use of this information. DASH Diet: Care Instructions  Your Care Instructions    The DASH diet is an eating plan that can help lower your blood pressure. DASH stands for Dietary Approaches to Stop Hypertension. Hypertension is high blood pressure. The DASH diet focuses on eating foods that are high in calcium, potassium, and magnesium. These nutrients can lower blood pressure. The foods that are highest in these nutrients are fruits, vegetables, low-fat dairy products, nuts, seeds, and legumes. But taking calcium, potassium, and magnesium supplements instead of eating foods that are high in those nutrients does not have the same effect. The DASH diet also includes whole grains, fish, and poultry. The DASH diet is one of several lifestyle changes your doctor may recommend to lower your high blood pressure. Your doctor may also want you to decrease the amount of sodium in your diet. Lowering sodium while following the DASH diet can lower blood pressure even further than just the DASH diet alone. Follow-up care is a key part of your treatment and safety. Be sure to make and go to all appointments, and call your doctor if you are having problems. It's also a good idea to know your test results and keep a list of the medicines you take. How can you care for yourself at home? Following the DASH diet  · Eat 4 to 5 servings of fruit each day. A serving is 1 medium-sized piece of fruit, ½ cup chopped or canned fruit, 1/4 cup dried fruit, or 4 ounces (½ cup) of fruit juice. Choose fruit more often than fruit juice. · Eat 4 to 5 servings of vegetables each day. A serving is 1 cup of lettuce or raw leafy vegetables, ½ cup of chopped or cooked vegetables, or 4 ounces (½ cup) of vegetable juice. Choose vegetables more often than vegetable juice. · Get 2 to 3 servings of low-fat and fat-free dairy each day. A serving is 8 ounces of milk, 1 cup of yogurt, or 1 ½ ounces of cheese.   · Eat 6 to 8 servings of grains each day. A serving is 1 slice of bread, 1 ounce of dry cereal, or ½ cup of cooked rice, pasta, or cooked cereal. Try to choose whole-grain products as much as possible. · Limit lean meat, poultry, and fish to 2 servings each day. A serving is 3 ounces, about the size of a deck of cards. · Eat 4 to 5 servings of nuts, seeds, and legumes (cooked dried beans, lentils, and split peas) each week. A serving is 1/3 cup of nuts, 2 tablespoons of seeds, or ½ cup of cooked beans or peas. · Limit fats and oils to 2 to 3 servings each day. A serving is 1 teaspoon of vegetable oil or 2 tablespoons of salad dressing. · Limit sweets and added sugars to 5 servings or less a week. A serving is 1 tablespoon jelly or jam, ½ cup sorbet, or 1 cup of lemonade. · Eat less than 2,300 milligrams (mg) of sodium a day. If you limit your sodium to 1,500 mg a day, you can lower your blood pressure even more. Tips for success  · Start small. Do not try to make dramatic changes to your diet all at once. You might feel that you are missing out on your favorite foods and then be more likely to not follow the plan. Make small changes, and stick with them. Once those changes become habit, add a few more changes. · Try some of the following:  ¨ Make it a goal to eat a fruit or vegetable at every meal and at snacks. This will make it easy to get the recommended amount of fruits and vegetables each day. ¨ Try yogurt topped with fruit and nuts for a snack or healthy dessert. ¨ Add lettuce, tomato, cucumber, and onion to sandwiches. ¨ Combine a ready-made pizza crust with low-fat mozzarella cheese and lots of vegetable toppings. Try using tomatoes, squash, spinach, broccoli, carrots, cauliflower, and onions. ¨ Have a variety of cut-up vegetables with a low-fat dip as an appetizer instead of chips and dip. ¨ Sprinkle sunflower seeds or chopped almonds over salads.  Or try adding chopped walnuts or almonds to cooked vegetables. ¨ Try some vegetarian meals using beans and peas. Add garbanzo or kidney beans to salads. Make burritos and tacos with mashed glnyn beans or black beans. Where can you learn more? Go to http://lt-aguilar.info/. Enter Q870 in the search box to learn more about \"DASH Diet: Care Instructions. \"  Current as of: September 21, 2016  Content Version: 11.4  © 6139-8103 [employer]. Care instructions adapted under license by [employer] (which disclaims liability or warranty for this information). If you have questions about a medical condition or this instruction, always ask your healthcare professional. [address] any warranty or liability for your use of this information. Starting a Weight Loss Plan: Care Instructions  Your Care Instructions    If you are thinking about losing weight, it can be hard to know where to start. Your doctor can help you set up a weight loss plan that best meets your needs. You may want to take a class on nutrition or exercise, or join a weight loss support group. If you have questions about how to make changes to your eating or exercise habits, ask your doctor about seeing a registered dietitian or an exercise specialist.  It can be a big challenge to lose weight. But you do not have to make huge changes at once. Make small changes, and stick with them. When those changes become habit, add a few more changes. If you do not think you are ready to make changes right now, try to pick a date in the future. Make an appointment to see your doctor to discuss whether the time is right for you to start a plan. Follow-up care is a key part of your treatment and safety. Be sure to make and go to all appointments, and call your doctor if you are having problems. It's also a good idea to know your test results and keep a list of the medicines you take. How can you care for yourself at home? · Set realistic goals. Many people expect to lose much more weight than is likely. A weight loss of 5% to 10% of your body weight may be enough to improve your health. · Get family and friends involved to provide support. Talk to them about why you are trying to lose weight, and ask them to help. They can help by participating in exercise and having meals with you, even if they may be eating something different. · Find what works best for you. If you do not have time or do not like to cook, a program that offers meal replacement bars or shakes may be better for you. Or if you like to prepare meals, finding a plan that includes daily menus and recipes may be best.  · Ask your doctor about other health professionals who can help you achieve your weight loss goals. ¨ A dietitian can help you make healthy changes in your diet. ¨ An exercise specialist or  can help you develop a safe and effective exercise program.  ¨ A counselor or psychiatrist can help you cope with issues such as depression, anxiety, or family problems that can make it hard to focus on weight loss. · Consider joining a support group for people who are trying to lose weight. Your doctor can suggest groups in your area. Where can you learn more? Go to http://tlYummlyaguilar.info/. Enter K984 in the search box to learn more about \"Starting a Weight Loss Plan: Care Instructions. \"  Current as of: October 13, 2016  Content Version: 11.4  © 5756-0720 [employer], [employer]. Care instructions adapted under license by [employer] (which disclaims liability or warranty for this information). If you have questions about a medical condition or this instruction, always ask your healthcare professional. [place] [address] any warranty or liability for your use of this information. WEIGHT LOSS PLAN    1. Read food labels and count calories.   Goal 7161-7799 calories daily for women and 4914-9959 calories daily for men.  Achieve this by decreasing caloric intake by 500 calories by weekly increments. NOTE:  1 pound = 3500 calories! Www.loseit. [employer]  Www.Toolwinesspal.[employer]Digital  Www.[employer]  Www.[employer]. gov  Weight watchers mobile    Calories needed to lose 1-2 pounds a week = 10 x your weight in pounds    2. Increase water intake. Per [facility], it is important to drink 1/2 your body weight in ounces of water daily. Decrease your water consumption 2-3 hours before bedtime to prevent sleep disturbance from frequent urination. 3.  Decrease sugary beverages. Each can or glass of soda increases your risk of obesity by 60%. Can lose 10 pounds in a month by avoiding any soda. 12 oz can of soda = 140 calories, 16 oz cup of sweet tea = 200 calories    16 oz orange juice = 200 calories, 10 oz apple juice = 150 calories   32 oz sports drink = 200 calories, 16 oz punch = 240 calories   3.5 oz alcohol = 100-150 calories     4. Avoid High Fructose Corn Syrup products. This ingredient makes products highly addictive! 5. Exercise 30 minutes daily 5 days weekly, minimally. If you burn 3500 calories that equals a pound! Use a pedometer to count steps. Visit www. [employer] for a free pedometer and diet recommendations. Your maximum heart rate = 220 - your age    Never exercise at your maximum heart rate. See handout for target heart rate. 6.  Decrease carbohydrates (white bread, pasta, rice, potatoes, sweet foods and sweet drinks like soda, tea, coffee, juice and sports drinks). Increase fiber and protein. Goal:   calories daily of carbohydrates     Try CHROMIUM PICONATE 200 MG THREE TIMES DAILY,    to decrease Premenstrual carbohydrate cravings. 7.  Eat 3-5 small meals daily, include lots of protein (beans/legumes, nuts, lean meat, eggs) and green vegetables with each. (Breakfast, lunch, dinner with 2 healthy snacks)    8.   Get proper rest 7-8 hours uninterrupted. When you get less than 6 hours, it triggers hunger by affecting your [place]:Leptin ratio and this results in weight gain. 9.  Watch your food portions. Green leafy vegetables should cover 1/2 of the plate, lean meat 1/4 of the plate, starchy vegetable 1/4 of the plate. Use smaller plates. 10.  Do not eat until you are full. Eat until you are no longer hungry. If you are not sure, try drinking a glass of water before getting your second serving of food. 11.  Do not weigh yourself daily. Wait until your next office visit. Use how you feel and  how your clothes fit as measurements of success. 12.  Address your spirituality to draw strength from above during your journey. Remember \"I am fearfully and wonderfully made. Marvelous in His eyes. \"    13. Set realistic, appropriate and achievable weight loss goals:     RECOMMENDED TARGET WEIGHT LOSS:  Initial weight loss of 5-10% of your initial body weight achieved over 6 months or a decrease of 2 BMI units. MINIMUM GOAL OF WEIGHT LOSS:  Reduce body weight and maintain a lower body weight. Prevent weight gain. RELATED WEBSITES:      Www.obesityaction. org  (and consider joining the Obesity Action Coalition for $25/year. The [address] mission is to elevated and empower those affected by obesity through education, advocacy and support. Quarterly journals included in membership fee. )    Www.[employer] Global Support. App[employer]ially  www.Peer60.com     RELATED DIETS:  Dr. Elif Larose Weight loss challenge, Mediterranean diet, [address] [place] [address] Watchers, Paleo Diet (anti-inflammatory diet)      EXERCISE 150 MINUTES WEEKLY. THIS CAN BE ACHIEVED BY WORKING OUT OR WALKING A MINIMUM OF 30 MINUTES FOR 5 DAYS WEEKLY. YOU CAN EXERCISE IN INCREMENTS OF 10-15 MINUTES UP TO 3 TIMES A DAY. Consider performing \"brainless exercise. \"  Choose your favorite tv program.  Five minutes before and for 5 minutes after the tv program, stretch your body.  While the program is on, walk in place watching the show. When commercials come on, rest or walk around the house to do other things. When the program begins, return to walking in place. When you are able keep walking during the commercials and add light weights to your ankles or hands. By the end of the show, you would have walked 30 minutes. If you need shorter spurts of exercise, walk during the commercials and rest during the show. Drink to glasses of water prior to any exercise to prevent dehydration and to improve the results of the work out. Your RESTING HEART RATE is the number of times your heart beats per minute when you are not exerting yourself. The more fit you are, the lower your resting heart rate will be. Your MAXIMUM HEART RATE is the number of times per minute your heart pumps when it is working at 100% capacity. NEVER EXERCISE AT YOUR MAXIMUM HEART RATE. MAX HEART RATE = 220 - your age    For example, in a 48year old, the maximum heart rate is 220-50 = 170 beats per minute    Your [place] is the number of beats per minute our heart should pump during aerobic exercise. Reaching your target heart rate indicates that your body is receiving maximum cardiovascular and fat burning benefits.      If you are fit, your TARGET HEART RATE = 70-85% of your maximum Heart rate      For a 48year old with vigorous intensity physical activity,         Target heart rate= 170 bpm x .70 = 119 bpm     Target heart rate = 170 bpm x .85=  145 bpm        Therefore, your target heart rate during physical activity is 119-145      If you are not fit, TARGET HEART RATE = 50-70% of your maximum Heart rate    MODERATE CONSISTENT AEROBIC EXERCISE OR WALKING FOR AT LEAST 150 MINUTES WEEKLY IS AN ESSENTIAL PART OF ANY WEIGHT LOSS OF WEIGHT MAINTENANCE PROGRAM.     During WEIGHT LOSS PHASE, at least 75% of your exercise needs to be walking or moderate aerobic activity and 25% or 0% can be Isometric or Resistance type exercises (the latter can be deferred to the weight maintenance phase.)     During WEIGHT MAINTENANCE PHASE, at least 50% of your exercise needs to be aerobic and 50% Isometric or Resistance type exercises. BENEFITS OF MODERATE INTENSITY EXERCISE MOST DAYS OF THE WEEK:     1. Insulin Resistance improves 30-85%   2. Abdominal Fat decreases by 30%   3. Inflammatory Markers decrease by 30% (therefore pain decreases)   4. Systolic and Diastolic Blood Pressure decrease by 4 mmHg   5. HDL improves by 5%   6. Triglycerides decrease by 15%   7. Shift from small dense LDL to large dense LDL (therefore decrease Insulin Resistance)   8. Decrease coagulability                  Saline Nasal Washes: Care Instructions  Your Care Instructions  Saline nasal washes help keep the nasal passages open by washing out thick or dried mucus. This simple remedy can help relieve symptoms of allergies, sinusitis, and colds. It also can make the nose feel more comfortable by keeping the mucous membranes moist. You may notice a little burning sensation in your nose the first few times you use the solution, but this usually gets better in a few days. Follow-up care is a key part of your treatment and safety. Be sure to make and go to all appointments, and call your doctor if you are having problems. It's also a good idea to know your test results and keep a list of the medicines you take. How can you care for yourself at home? · You can buy premixed saline solution in a squeeze bottle or other sinus rinse products at a drugstore. Read and follow the instructions on the label. · You also can make your own saline solution by adding 1 teaspoon of salt and 1 teaspoon of baking soda to 2 cups of distilled water. · If you use a homemade solution, pour a small amount into a clean bowl. Using a rubber bulb syringe, squeeze the syringe and place the tip in the salt water.  Pull a small amount of the salt water into the syringe by relaxing your hand. · Sit down with your head tilted slightly back. Do not lie down. Put the tip of the bulb syringe or the squeeze bottle a little way into one of your nostrils. Gently drip or squirt a few drops into the nostril. Repeat with the other nostril. Some sneezing and gagging are normal at first.  · Gently blow your nose. · Wipe the syringe or bottle tip clean after each use. · Repeat this 2 or 3 times a day. · Use nasal washes gently if you have nosebleeds often. When should you call for help? Watch closely for changes in your health, and be sure to contact your doctor if:  ? · You often get nosebleeds. ? · You have problems doing the nasal washes. Where can you learn more? Go to http://tl-aguilar.info/. Enter [id number] [id number] in the search box to learn more about \"Saline Nasal Washes: Care Instructions. \"  Current as of: May 12, 2017  Content Version: 11.4  © 4493-0261 Healthwise, [employer]orporated. Care instructions adapted under license by [employer] (which disclaims liability or warranty for this information). If you have questions about a medical condition or this instruction, always ask your healthcare professional. [address] any warranty or liability for your use of this information.

## 2018-04-05 LAB
ALBUMIN/CREAT UR: 67.7 MG/G CREAT (ref 0–30)
APPEARANCE UR: CLEAR
BACTERIA #/AREA URNS HPF: ABNORMAL /[HPF]
BILIRUB UR QL STRIP: NEGATIVE
CASTS URNS QL MICRO: ABNORMAL /LPF
COLOR UR: YELLOW
CREAT UR-MCNC: 190.9 MG/DL
CRYSTALS URNS MICRO: ABNORMAL
EPI CELLS #/AREA URNS HPF: ABNORMAL /HPF
GLUCOSE UR QL: NEGATIVE
HGB UR QL STRIP: NEGATIVE
KETONES UR QL STRIP: NEGATIVE
LEUKOCYTE ESTERASE UR QL STRIP: NEGATIVE
MICRO URNS: ABNORMAL
MICROALBUMIN UR-MCNC: 129.3 UG/ML
MUCOUS THREADS URNS QL MICRO: PRESENT
NITRITE UR QL STRIP: NEGATIVE
PH UR STRIP: 6.5 [PH] (ref 5–7.5)
PROT UR QL STRIP: ABNORMAL
RBC #/AREA URNS HPF: ABNORMAL /HPF
SP GR UR: 1.02 (ref 1–1.03)
UNIDENT CRYS URNS QL MICRO: PRESENT
UROBILINOGEN UR STRIP-MCNC: 1 MG/DL (ref 0.2–1)
WBC #/AREA URNS HPF: ABNORMAL /HPF

## 2018-04-06 LAB — BACTERIA UR CULT: NORMAL

## 2018-05-09 ENCOUNTER — TELEPHONE (OUTPATIENT)
Dept: FAMILY MEDICINE CLINIC | Age: 43
End: 2018-05-09

## 2018-05-09 NOTE — TELEPHONE ENCOUNTER
----- Message from Language Learning Class sent at 5/9/2018  1:00 PM EDT -----  Regarding: Dr. Shar De Leon Refill  Pt is calling to see if she can get a refill on all of her current Rx sent to her pharmacy on file. Best contact: [phone].

## 2018-05-09 NOTE — TELEPHONE ENCOUNTER
Which medications or medication does she need? Dr. Olive Earl gave 6mo-1 year on 04/04/2018 for Lisinopril, Singulair, and Asamanex twisthaler. The only one left was the amlodipine that was filled last year on 03/22/2017.

## 2018-07-14 ENCOUNTER — APPOINTMENT (OUTPATIENT)
Dept: CT IMAGING | Age: 43
End: 2018-07-14
Attending: PHYSICIAN ASSISTANT
Payer: COMMERCIAL

## 2018-07-14 ENCOUNTER — HOSPITAL ENCOUNTER (EMERGENCY)
Age: 43
Discharge: HOME | End: 2018-07-14
Attending: EMERGENCY MEDICINE
Payer: COMMERCIAL

## 2018-07-14 ENCOUNTER — APPOINTMENT (OUTPATIENT)
Dept: ULTRASOUND IMAGING | Age: 43
End: 2018-07-14
Attending: PHYSICIAN ASSISTANT
Payer: COMMERCIAL

## 2018-07-14 VITALS
WEIGHT: 200 LBS | HEART RATE: 88 BPM | OXYGEN SATURATION: 100 % | TEMPERATURE: 98.4 F | BODY MASS INDEX: 36.8 KG/M2 | HEIGHT: 62 IN | DIASTOLIC BLOOD PRESSURE: 84 MMHG | RESPIRATION RATE: 18 BRPM | SYSTOLIC BLOOD PRESSURE: 172 MMHG

## 2018-07-14 DIAGNOSIS — N93.9 VAGINAL BLEEDING: ICD-10-CM

## 2018-07-14 DIAGNOSIS — D25.9 UTERINE LEIOMYOMA, UNSPECIFIED LOCATION: Primary | ICD-10-CM

## 2018-07-14 DIAGNOSIS — D64.9 ANEMIA, UNSPECIFIED TYPE: ICD-10-CM

## 2018-07-14 LAB
ALBUMIN SERPL-MCNC: 3.2 G/DL (ref 3.5–5)
ALBUMIN/GLOB SERPL: 0.7 {RATIO} (ref 1.1–2.2)
ALP SERPL-CCNC: 93 U/L (ref 45–117)
ALT SERPL-CCNC: 16 U/L (ref 12–78)
ANION GAP SERPL CALC-SCNC: 7 MMOL/L (ref 5–15)
APPEARANCE UR: ABNORMAL
AST SERPL-CCNC: 9 U/L (ref 15–37)
ATRIAL RATE: 82 BPM
BACTERIA URNS QL MICRO: ABNORMAL /HPF
BASOPHILS # BLD: 0 K/UL (ref 0–0.1)
BASOPHILS NFR BLD: 0 % (ref 0–1)
BILIRUB SERPL-MCNC: 0.3 MG/DL (ref 0.2–1)
BILIRUB UR QL: NEGATIVE
BUN SERPL-MCNC: 10 MG/DL (ref 6–20)
BUN/CREAT SERPL: 10 (ref 12–20)
CALCIUM SERPL-MCNC: 8.6 MG/DL (ref 8.5–10.1)
CALCULATED P AXIS, ECG09: -42 DEGREES
CALCULATED R AXIS, ECG10: 11 DEGREES
CALCULATED T AXIS, ECG11: -12 DEGREES
CHLORIDE SERPL-SCNC: 106 MMOL/L (ref 97–108)
CO2 SERPL-SCNC: 28 MMOL/L (ref 21–32)
COLOR UR: ABNORMAL
CREAT SERPL-MCNC: 0.96 MG/DL (ref 0.55–1.02)
DIAGNOSIS, 93000: NORMAL
DIFFERENTIAL METHOD BLD: ABNORMAL
EOSINOPHIL # BLD: 0.3 K/UL (ref 0–0.4)
EOSINOPHIL NFR BLD: 3 % (ref 0–7)
EPITH CASTS URNS QL MICRO: ABNORMAL /LPF
ERYTHROCYTE [DISTWIDTH] IN BLOOD BY AUTOMATED COUNT: 21.4 % (ref 11.5–14.5)
GLOBULIN SER CALC-MCNC: 4.6 G/DL (ref 2–4)
GLUCOSE SERPL-MCNC: 88 MG/DL (ref 65–100)
GLUCOSE UR STRIP.AUTO-MCNC: NEGATIVE MG/DL
HCG UR QL: NEGATIVE
HCT VFR BLD AUTO: 27.4 % (ref 35–47)
HGB BLD-MCNC: 7.5 G/DL (ref 11.5–16)
HGB UR QL STRIP: ABNORMAL
IMM GRANULOCYTES # BLD: 0 K/UL (ref 0–0.04)
IMM GRANULOCYTES NFR BLD AUTO: 0 % (ref 0–0.5)
KETONES UR QL STRIP.AUTO: NEGATIVE MG/DL
LEUKOCYTE ESTERASE UR QL STRIP.AUTO: ABNORMAL
LYMPHOCYTES # BLD: 1.7 K/UL (ref 0.8–3.5)
LYMPHOCYTES NFR BLD: 20 % (ref 12–49)
MCH RBC QN AUTO: 17.5 PG (ref 26–34)
MCHC RBC AUTO-ENTMCNC: 27.4 G/DL (ref 30–36.5)
MCV RBC AUTO: 63.9 FL (ref 80–99)
MONOCYTES # BLD: 0.5 K/UL (ref 0–1)
MONOCYTES NFR BLD: 6 % (ref 5–13)
NEUTS SEG # BLD: 5.8 K/UL (ref 1.8–8)
NEUTS SEG NFR BLD: 71 % (ref 32–75)
NITRITE UR QL STRIP.AUTO: NEGATIVE
NRBC # BLD: 0 K/UL (ref 0–0.01)
NRBC BLD-RTO: 0 PER 100 WBC
P-R INTERVAL, ECG05: 126 MS
PH UR STRIP: 6 [PH] (ref 5–8)
PLATELET # BLD AUTO: 551 K/UL (ref 150–400)
PMV BLD AUTO: 10 FL (ref 8.9–12.9)
POTASSIUM SERPL-SCNC: 3.3 MMOL/L (ref 3.5–5.1)
PROT SERPL-MCNC: 7.8 G/DL (ref 6.4–8.2)
PROT UR STRIP-MCNC: 30 MG/DL
Q-T INTERVAL, ECG07: 388 MS
QRS DURATION, ECG06: 78 MS
QTC CALCULATION (BEZET), ECG08: 453 MS
RBC # BLD AUTO: 4.29 M/UL (ref 3.8–5.2)
RBC #/AREA URNS HPF: >100 /HPF (ref 0–5)
RBC MORPH BLD: ABNORMAL
SODIUM SERPL-SCNC: 141 MMOL/L (ref 136–145)
SP GR UR REFRACTOMETRY: 1.02 (ref 1–1.03)
TROPONIN I SERPL-MCNC: <0.05 NG/ML
UR CULT HOLD, URHOLD: NORMAL
UROBILINOGEN UR QL STRIP.AUTO: 1 EU/DL (ref 0.2–1)
VENTRICULAR RATE, ECG03: 82 BPM
WBC # BLD AUTO: 8.3 K/UL (ref 3.6–11)
WBC URNS QL MICRO: ABNORMAL /HPF (ref 0–4)

## 2018-07-14 PROCEDURE — 74011250636 HC RX REV CODE- 250/636: Performed by: EMERGENCY MEDICINE

## 2018-07-14 PROCEDURE — 87086 URINE CULTURE/COLONY COUNT: CPT | Performed by: EMERGENCY MEDICINE

## 2018-07-14 PROCEDURE — 74011636320 HC RX REV CODE- 636/320: Performed by: EMERGENCY MEDICINE

## 2018-07-14 PROCEDURE — 70450 CT HEAD/BRAIN W/O DYE: CPT

## 2018-07-14 PROCEDURE — 84484 ASSAY OF TROPONIN QUANT: CPT | Performed by: PHYSICIAN ASSISTANT

## 2018-07-14 PROCEDURE — 81025 URINE PREGNANCY TEST: CPT

## 2018-07-14 PROCEDURE — 70498 CT ANGIOGRAPHY NECK: CPT

## 2018-07-14 PROCEDURE — 81001 URINALYSIS AUTO W/SCOPE: CPT | Performed by: PHYSICIAN ASSISTANT

## 2018-07-14 PROCEDURE — 74011000258 HC RX REV CODE- 258: Performed by: EMERGENCY MEDICINE

## 2018-07-14 PROCEDURE — 96374 THER/PROPH/DIAG INJ IV PUSH: CPT

## 2018-07-14 PROCEDURE — 96361 HYDRATE IV INFUSION ADD-ON: CPT

## 2018-07-14 PROCEDURE — 93005 ELECTROCARDIOGRAM TRACING: CPT

## 2018-07-14 PROCEDURE — 74011250637 HC RX REV CODE- 250/637: Performed by: EMERGENCY MEDICINE

## 2018-07-14 PROCEDURE — 76856 US EXAM PELVIC COMPLETE: CPT

## 2018-07-14 PROCEDURE — 99285 EMERGENCY DEPT VISIT HI MDM: CPT

## 2018-07-14 PROCEDURE — 80053 COMPREHEN METABOLIC PANEL: CPT | Performed by: PHYSICIAN ASSISTANT

## 2018-07-14 PROCEDURE — 76830 TRANSVAGINAL US NON-OB: CPT

## 2018-07-14 PROCEDURE — 85025 COMPLETE CBC W/AUTO DIFF WBC: CPT | Performed by: PHYSICIAN ASSISTANT

## 2018-07-14 PROCEDURE — 36415 COLL VENOUS BLD VENIPUNCTURE: CPT | Performed by: PHYSICIAN ASSISTANT

## 2018-07-14 RX ORDER — ACETAMINOPHEN 500 MG
1000 TABLET ORAL ONCE
Status: COMPLETED | OUTPATIENT
Start: 2018-07-14 | End: 2018-07-14

## 2018-07-14 RX ORDER — LANOLIN ALCOHOL/MO/W.PET/CERES
165 CREAM (GRAM) TOPICAL
Qty: 30 TAB | Refills: 0 | Status: SHIPPED | OUTPATIENT
Start: 2018-07-14 | End: 2018-08-13

## 2018-07-14 RX ORDER — HYDRALAZINE HYDROCHLORIDE 20 MG/ML
20 INJECTION INTRAMUSCULAR; INTRAVENOUS
Status: COMPLETED | OUTPATIENT
Start: 2018-07-14 | End: 2018-07-14

## 2018-07-14 RX ORDER — SODIUM CHLORIDE 0.9 % (FLUSH) 0.9 %
10 SYRINGE (ML) INJECTION
Status: COMPLETED | OUTPATIENT
Start: 2018-07-14 | End: 2018-07-14

## 2018-07-14 RX ADMIN — Medication 10 ML: at 16:10

## 2018-07-14 RX ADMIN — ACETAMINOPHEN 1000 MG: 500 TABLET, FILM COATED ORAL at 15:38

## 2018-07-14 RX ADMIN — SODIUM CHLORIDE 1000 ML: 900 INJECTION, SOLUTION INTRAVENOUS at 15:38

## 2018-07-14 RX ADMIN — SODIUM CHLORIDE 100 ML: 900 INJECTION, SOLUTION INTRAVENOUS at 16:10

## 2018-07-14 RX ADMIN — IOPAMIDOL 100 ML: 755 INJECTION, SOLUTION INTRAVENOUS at 16:10

## 2018-07-14 RX ADMIN — HYDRALAZINE HYDROCHLORIDE 20 MG: 20 INJECTION INTRAMUSCULAR; INTRAVENOUS at 15:38

## 2018-07-14 NOTE — ED TRIAGE NOTES
Triage Note: Patient complains of generalized headaches x 2 weeks that has gotten worse today. Patient also reports being on her monthly cycle, but is having large \"clots\". Denies abdominal pain. +lower back pain.

## 2018-07-14 NOTE — DISCHARGE INSTRUCTIONS
Anemia: Care Instructions  Your Care Instructions    Anemia is a low level of red blood cells, which carry oxygen throughout your body. Many things can cause anemia. Lack of iron is one of the most common causes. Your body needs iron to make hemoglobin, a substance in red blood cells that carries oxygen from the lungs to your body's cells. Without enough iron, the body produces fewer and smaller red blood cells. As a result, your body's cells do not get enough oxygen, and you feel tired and weak. And you may have trouble concentrating. Bleeding is the most common cause of a lack of iron. You may have heavy menstrual bleeding or bleeding caused by conditions such as ulcers, hemorrhoids, or cancer. Regular use of aspirin or other anti-inflammatory medicines (such as ibuprofen) also can cause bleeding in some people. A lack of iron in your diet also can cause anemia, especially at times when the body needs more iron, such as during pregnancy, infancy, and the teen years. Your doctor may have prescribed iron pills. It may take several months of treatment for your iron levels to return to normal. Your doctor also may suggest that you eat foods that are rich in iron, such as meat and beans. There are many other causes of anemia. It is not always due to a lack of iron. Finding the specific cause of your anemia will help your doctor find the right treatment for you. Follow-up care is a key part of your treatment and safety. Be sure to make and go to all appointments, and call your doctor if you are having problems. It's also a good idea to know your test results and keep a list of the medicines you take. How can you care for yourself at home? · Take your medicines exactly as prescribed. Call your doctor if you think you are having a problem with your medicine. · If your doctor recommends iron pills, take them as directed:  ¨ Try to take the pills on an empty stomach about 1 hour before or 2 hours after meals. But you may need to take iron with food to avoid an upset stomach. ¨ Do not take antacids or drink milk or caffeine drinks (such as coffee, tea, or cola) at the same time or within 2 hours of the time that you take your iron. They can make it hard for your body to absorb the iron. ¨ Vitamin C (from food or supplements) helps your body absorb iron. Try taking iron pills with a glass of orange juice or some other food that is high in vitamin C, such as citrus fruits. ¨ Iron pills may cause stomach problems, such as heartburn, nausea, diarrhea, constipation, and cramps. Be sure to drink plenty of fluids, and include fruits, vegetables, and fiber in your diet each day. Iron pills often make your bowel movements dark or green. ¨ If you forget to take an iron pill, do not take a double dose of iron the next time you take a pill. ¨ Keep iron pills out of the reach of small children. An overdose of iron can be very dangerous. · Follow your doctor's advice about eating iron-rich foods. These include red meat, shellfish, poultry, eggs, beans, raisins, whole-grain bread, and leafy green vegetables. · Steam vegetables to help them keep their iron content. When should you call for help? Call 911 anytime you think you may need emergency care. For example, call if:    · You have symptoms of a heart attack. These may include:  ¨ Chest pain or pressure, or a strange feeling in the chest.  ¨ Sweating. ¨ Shortness of breath. ¨ Nausea or vomiting. ¨ Pain, pressure, or a strange feeling in the back, neck, jaw, or upper belly or in one or both shoulders or arms. ¨ Lightheadedness or sudden weakness. ¨ A fast or irregular heartbeat. After you call 911, the  may tell you to chew 1 adult-strength or 2 to 4 low-dose aspirin. Wait for an ambulance.  Do not try to drive yourself.     · You passed out (lost consciousness).    Call your doctor now or seek immediate medical care if:    · You have new or increased shortness of breath.     · You are dizzy or lightheaded, or you feel like you may faint.     · Your fatigue and weakness continue or get worse.     · You have any abnormal bleeding, such as:  ¨ Nosebleeds. ¨ Vaginal bleeding that is different (heavier, more frequent, at a different time of the month) than what you are used to. ¨ Bloody or black stools, or rectal bleeding. ¨ Bloody or pink urine.    Watch closely for changes in your health, and be sure to contact your doctor if:    · You do not get better as expected. Where can you learn more? Go to http://tl-aguilar.info/. Enter R301 in the search box to learn more about \"Anemia: Care Instructions. \"  Current as of: October 9, 2017  Content Version: 11.7  © 6886-2262 [employer]. Care instructions adapted under license by [employer] (which disclaims liability or warranty for this information). If you have questions about a medical condition or this instruction, always ask your healthcare professional. [place] [address] any warranty or liability for your use of this information. Abnormal Uterine Bleeding: Care Instructions  Your Care Instructions    Abnormal uterine bleeding (AUB) is irregular bleeding from the uterus that is longer or heavier than usual or does not occur at your regular time. Sometimes it is caused by changes in hormone levels. It can also be caused by growths in the uterus, such as fibroids or polyps. Sometimes a cause cannot be found. You may have heavy bleeding when you are not expecting your period. Your doctor may suggest a pregnancy test, if you think you are pregnant. Follow-up care is a key part of your treatment and safety. Be sure to make and go to all appointments, and call your doctor if you are having problems. It's also a good idea to know your test results and keep a list of the medicines you take. How can you care for yourself at home? · Be safe with medicines. Take pain medicines exactly as directed. ¨ If the doctor gave you a prescription medicine for pain, take it as prescribed. ¨ If you are not taking a prescription pain medicine, ask your doctor if you can take an over-the-counter medicine. · You may be low in iron because of blood loss. Eat a balanced diet that is high in iron and vitamin C. Foods rich in iron include red meat, shellfish, eggs, beans, and leafy green vegetables. Talk to your doctor about whether you need to take iron pills or a multivitamin. When should you call for help? Call 911 anytime you think you may need emergency care. For example, call if:    · You passed out (lost consciousness).    Call your doctor now or seek immediate medical care if:    · You have new or worse belly or pelvic pain.     · You have severe vaginal bleeding.     · You feel dizzy or lightheaded, or you feel like you may faint.    Watch closely for changes in your health, and be sure to contact your doctor if:    · You think you may be pregnant.     · Your bleeding gets worse.     · You do not get better as expected. Where can you learn more? Go to http://tl-aguilar.info/. Enter I175 in the search box to learn more about \"Abnormal Uterine Bleeding: Care Instructions. \"  Current as of: October 6, 2017  Content Version: 11.7  © 4936-2377 Healthwise, Incorporated. Care instructions adapted under license by [employer] (which disclaims liability or warranty for this information). If you have questions about a medical condition or this instruction, always ask your healthcare professional. [place] [address] any warranty or liability for your use of this information.

## 2018-07-14 NOTE — ED PROVIDER NOTES
HPI Comments: 38 y.o. female with past medical history significant for HTN, tubal pregnancy, menorrhagia and hypercholesterolemia who presents via private vehicle with chief complaint of HA. Pt reports constant frontal HA for 2 weeks that has progressively worsened since onset and is currently an 8/10. Pt notes she has taken Advil and has tried to \"sleep off her HA\", without improvement. Pt states that she believes her HA is HTN related but admits she is unsure why she thinks this. Pt also notes heavy bleeding with large clots during her menstrual cycle, recent lightheadedness and back pain. Pt explains she was previously admitted after her BP was ~200/198 but explains at that time she was asymptomatic. Pt notes upon discharge her HTN medications were adjusted and she has been compliant with these changes. Pt states she takes lisinopril to control her HTN. Pt also notes hx of a gynocological surgery, which she is unsure the name of, ~2 years ago at [place]. Pt explains after this she was told she had uterine fibroids. Pt denies previous hx of HA. Pt denies vaginal bleeding between her menstrual cycles. There are no other acute medical concerns at this time. Social hx: , denies tobacco use  PCP: Opal Gaines DO  OBGYN: Abigail Platt MD     Old Chart Review: Pt was admitted to [facility] [place] from 9/9/2015-9/10/2015 after syncopal episode for control of elevated BP. Note written by Edith Dumont, as dictated by Yasmin Ureña MD 3:16 PM      The history is provided by the patient. Past Medical History:   Diagnosis Date    Allergy, unspecified not elsewhere classified     Anemia     Ankle sprain 03/2009    bilateral.  Dr. Maria Victoria Hare Asthma childhood    Breast mass 5/5/09    Left. benign.  Chickenpox childhood    Ectopic pregnancy 04/2010    Right.   Dr. Dano Chavez hypertension, benign     Menorrhagia 08/2015    Dr. Сергей Morin Ovarian cyst 2011 [facility] hypercholesterolemia 05/2009    Thrombocytosis ([address] 05/2009    Tubal pregnancy 06/2011    Dr Aaliyah Porter     Vitamin D deficiency 05/2009       Past Surgical History:   Procedure Laterality Date    HX DILATION AND CURETTAGE  8/11/15    Dr. Ann Marie Wheeler. due to heavy vag bleeding and thick endometrial stripe.  HX SALPINGO-OOPHORECTOMY Right 04/2010    Right. due to ectopic pregnancy. Dr. Aaliyah Porter.  HX TUBAL LIGATION  2002    Dr. Ann Marie Wheeler         Family History:   Problem Relation Age of Onset    Diabetes Mother      type ll    Hypertension Mother     Diabetes Father      type ll       Social History     Social History    Marital status:      Spouse name: N/A    Number of children: N/A    Years of education: N/A     Occupational History    Not on file. Social History Main Topics    Smoking status: Never Smoker    Smokeless tobacco: Never Used    Alcohol use No    Drug use: No    Sexual activity: Yes     Partners: Male     Birth control/ protection: Surgical      Comment: TL     Other Topics Concern    Not on file     Social History Narrative         ALLERGIES: Bactrim [sulfamethoxazole-trimethoprim]    Review of Systems   Constitutional: Negative for activity change, chills and fever. HENT: Negative for nosebleeds, sore throat, trouble swallowing and voice change. Eyes: Negative for visual disturbance. Respiratory: Negative for shortness of breath. Cardiovascular: Negative for chest pain and palpitations. Gastrointestinal: Negative for abdominal pain, constipation, diarrhea and nausea. Genitourinary: Negative for difficulty urinating, dysuria, hematuria and urgency. Musculoskeletal: Positive for back pain. Negative for neck pain and neck stiffness. Skin: Negative for color change. Allergic/Immunologic: Negative for immunocompromised state. Neurological: Positive for light-headedness and headaches.  Negative for dizziness, seizures, syncope, weakness and numbness. Psychiatric/Behavioral: Negative for behavioral problems, confusion, hallucinations, self-injury and suicidal ideas. All other systems reviewed and are negative. Vitals:    07/14/18 1421   BP: (!) 192/94   Pulse: (!) 110   Resp: 18   Temp: 99.9 °F (37.7 °C)   SpO2: 99%   Weight: 90.7 kg (200 lb)   Height: 5' 2\" (1.575 m)            Physical Exam   Constitutional: She is oriented to person, place, and time. She appears well-developed and well-nourished. No distress. HENT:   Head: Normocephalic and atraumatic. Eyes: Pupils are equal, round, and reactive to light. Neck: Normal range of motion. Neck supple. Cardiovascular: Normal rate, regular rhythm and normal heart sounds. Exam reveals no gallop and no friction rub. No murmur heard. Pulmonary/Chest: Effort normal and breath sounds normal. No respiratory distress. She has no wheezes. Abdominal: Soft. Bowel sounds are normal. She exhibits no distension. There is no tenderness. There is no rebound and no guarding. Musculoskeletal: Normal range of motion. Neurological: She is alert and oriented to person, place, and time. Skin: Skin is warm. No rash noted. She is not diaphoretic. Psychiatric: She has a normal mood and affect. Her behavior is normal. Judgment and thought content normal.   Nursing note and vitals reviewed. Note written by Edith Grimm, as dictated by Pillo Davis MD 3:27 PM    [facility]      ED Course     This is a 15-year-old female with past medical history, review of systems, physical exam as well stenting with complaints of headache, vaginal bleeding, hypertension. She states previous history of bleeding, thought secondary to fibroid disease, endorses some dizziness and lightheadedness, without chest pain or shortness of breath. He states the bleeding is dark and blood clots, without pain.  She endorses headache, refractory to over-the-counter pain medications. His exam is notable for well-appearing middle-age female in no acute distress, with mild tachycardia, clear breath sounds, soft nontender abdomen, nonfocal neurologic exam. Differential includes tension headache, dysmenorrhea, dysfunctional uterine bleeding, fibroid disease. Plan to provide IV fluids, obtain CMP, CBC, UA, head CT. We will make a disposition based on the patient's diagnostics and response to therapy. 6:35 PM    Patient with low hemoglobin, not quite at the level for transfusion. The patient states that headache is improved, unremarkable imaging of the head and neck. Ultrasound reveals fibroid disease, likely source of vaginal bleeding. Plan to discharge patient home, with return precautions, to follow with GYN for treatment of her fibroid. We'll provide iron supplementation for her anemia.     Procedures

## 2018-07-14 NOTE — ED NOTES
Patient has received discharge instructions from ER MD, verbalizes understanding.   Ambulatory upon discharge with family

## 2018-07-14 NOTE — ED NOTES

## 2018-07-16 LAB
BACTERIA SPEC CULT: NORMAL
CC UR VC: NORMAL
SERVICE CMNT-IMP: NORMAL

## 2018-07-23 ENCOUNTER — OFFICE VISIT (OUTPATIENT)
Dept: FAMILY MEDICINE CLINIC | Age: 43
End: 2018-07-23

## 2018-07-23 VITALS
WEIGHT: 200 LBS | SYSTOLIC BLOOD PRESSURE: 124 MMHG | DIASTOLIC BLOOD PRESSURE: 76 MMHG | BODY MASS INDEX: 36.8 KG/M2 | HEIGHT: 62 IN

## 2018-07-23 DIAGNOSIS — E55.9 VITAMIN D DEFICIENCY: ICD-10-CM

## 2018-07-23 DIAGNOSIS — R42 DIZZINESS: ICD-10-CM

## 2018-07-23 DIAGNOSIS — I10 ESSENTIAL HYPERTENSION, BENIGN: ICD-10-CM

## 2018-07-23 DIAGNOSIS — R53.82 CHRONIC FATIGUE: ICD-10-CM

## 2018-07-23 DIAGNOSIS — E66.9 OBESITY, CLASS II, BMI 35-39.9: ICD-10-CM

## 2018-07-23 DIAGNOSIS — D50.0 IRON DEFICIENCY ANEMIA DUE TO CHRONIC BLOOD LOSS: Primary | ICD-10-CM

## 2018-07-23 DIAGNOSIS — N83.201 CYSTS OF BOTH OVARIES: ICD-10-CM

## 2018-07-23 DIAGNOSIS — N83.202 CYSTS OF BOTH OVARIES: ICD-10-CM

## 2018-07-23 DIAGNOSIS — E87.6 HYPOKALEMIA: ICD-10-CM

## 2018-07-23 DIAGNOSIS — R31.0 HEMATURIA, GROSS: ICD-10-CM

## 2018-07-23 DIAGNOSIS — R35.1 NOCTURIA MORE THAN TWICE PER NIGHT: ICD-10-CM

## 2018-07-23 DIAGNOSIS — D75.839 THROMBOCYTOSIS: ICD-10-CM

## 2018-07-23 DIAGNOSIS — D72.828 OTHER ELEVATED WHITE BLOOD CELL (WBC) COUNT: ICD-10-CM

## 2018-07-23 PROBLEM — E66.01 SEVERE OBESITY (BMI 35.0-39.9): Status: ACTIVE | Noted: 2018-07-23

## 2018-07-23 PROBLEM — E66.01 SEVERE OBESITY (BMI 35.0-39.9): Status: RESOLVED | Noted: 2018-07-23 | Resolved: 2018-07-23

## 2018-07-23 RX ORDER — POTASSIUM CHLORIDE 20 MEQ/1
20 TABLET, EXTENDED RELEASE ORAL 2 TIMES DAILY
Qty: 30 TAB | Refills: 0 | Status: SHIPPED | OUTPATIENT
Start: 2018-07-23

## 2018-07-23 NOTE — PATIENT INSTRUCTIONS
Anemia From Heavy Bleeding: Care Instructions Your Care Instructions Anemia means that your body does not have enough red blood cells. Red blood cells carry oxygen around the body. When you have anemia, you may feel dizzy, tired, and weak. You may also feel your heart pounding. For some people, it's hard to focus and think clearly. One common cause of anemia is bleeding. Bleeding from ulcers, hemorrhoids, cancer, or other problems can cause anemia. It may also be caused by heavy menstrual periods. Your treatment may include iron pills. Iron helps your body make hemoglobin. Hemoglobin is the part of the red blood cell that carries oxygen. If you have severe anemia, you may need a blood transfusion to give you red blood cells as quickly as possible. Sometimes it takes several months to get iron levels back to normal. 
Follow-up care is a key part of your treatment and safety. Be sure to make and go to all appointments, and call your doctor if you are having problems. It's also a good idea to know your test results and keep a list of the medicines you take. How can you care for yourself at home? · Be safe with medicines. Take your medicines exactly as prescribed. Call your doctor if you think you are having a problem with your medicine. · Follow your doctor's advice about eating foods that have a lot of iron in them. These include red meat, shellfish, poultry, and eggs. They also include beans, raisins, whole-grain bread, and leafy green vegetables. · Steam your vegetables. This is the best way to prepare them if you want to get as much iron as possible. · Iron pills can cause constipation. If you take them, there are things you can do to avoid constipation. Drink plenty of fluids, eat foods with a lot of fiber, and exercise every day. When should you call for help? Call 911 anytime you think you may need emergency care.  For example, call if: 
  · You passed out (lost consciousness).  
  · Your stools are maroon or very bloody.  
 Call your doctor now or seek immediate medical care if: 
  · You are short of breath.  
  · You have new or worse bleeding.  
  · You are dizzy or light-headed, or you feel like you may faint.  
 Watch closely for changes in your health, and be sure to contact your doctor if: 
  · You feel weaker or more tired than usual.  
  · You do not get better as expected. Where can you learn more? Go to http://tl-aguilar.info/. Enter O672 in the search box to learn more about \"Anemia From Heavy Bleeding: Care Instructions. \" Current as of: October 9, 2017 Content Version: 11.7 © 9116-0685 [employer]. Care instructions adapted under license by [employer] (which disclaims liability or warranty for this information). If you have questions about a medical condition or this instruction, always ask your healthcare professional. [address] any warranty or liability for your use of this information.

## 2018-07-23 NOTE — MR AVS SNAPSHOT
[address] [place] [address] 
[address] [place] [id number] 
[phone] Patient: Kassandra Gleason MRN:  [id number] Visit Information Date & Time Provider Department Dept. Phone Encounter #  
 2018 10:30 AM Venancio Henriquez DO Thai [phone] [id number] Follow-up Instructions Return in about 2 months (around 2018) for results, anemia, referral follow up, blood pressure. Upcoming Health Maintenance Date Due Pneumococcal 19-64 Highest Risk (2 of 3 - PCV13) 10/31/2018* Influenza Age 5 to Adult 2018 PAP AKA CERVICAL CYTOLOGY 3/22/2020 DTaP/Tdap/Td series (2 - Td) 3/22/2027 *Topic was postponed. The date shown is not the original due date. Allergies as of 2018  Review Complete On: 2018 By: Venancio Henriquez DO Severity Noted Reaction Type Reactions Bactrim [Sulfamethoxazole-trimethoprim]  2010    Other (comments) GI Upset Current Immunizations  Reviewed on 2018 No immunizations on file. Reviewed by Venancio Henriquez DO on 2018 at 12:48 PM  
You Were Diagnosed With   
  
 Codes Comments Iron deficiency anemia due to chronic blood loss    -  Primary ICD-10-CM: D50.0 ICD-9-CM: 280.0 due to heavy menses with uterine fibroids vs other Essential hypertension, benign     ICD-10-CM: I10 
ICD-9-CM: 401.1 improved Chronic fatigue     ICD-10-CM: R53.82 
ICD-9-CM: 780.79 due to vit D vs menorrhagia vs severe anemia vs ?UTI vs other Dizziness     ICD-10-CM: B47 ICD-9-CM: 780.4 due to severe anemia vs hypokalemia vs ?UTI vs other Nocturia more than twice per night     ICD-10-CM: R35.1 ICD-9-CM: 788.43 since returning from [facility] [place] trip x 2 weeks Hematuria, gross     ICD-10-CM: R31.0 ICD-9-CM: 599.71 due to UTI vs menses vs other Thrombocytosis ([address]     ICD-10-CM: D47.3 ICD-9-CM: 238.71   
 Obesity, Class II, BMI 35-39.9     ICD-10-CM: E66.9 ICD-9-CM: 278.00 Other elevated white blood cell (WBC) count     ICD-10-CM: W55.679 ICD-9-CM: 288.69 resolved Cysts of both ovaries     ICD-10-CM: N83.201, V27.695 ICD-9-CM: 620.2 Vitamin D deficiency     ICD-10-CM: E55.9 ICD-9-CM: 268.9 Hypokalemia     ICD-10-CM: E87.6 ICD-9-CM: 276.8 Vitals BP Pulse Temp Resp Height(growth percentile) Weight(growth percentile) 124/76 (BP 1 Location: Left arm, BP Patient Position: Sitting) (P) 83 (P) 98 °F (36.7 °C) (Temporal) (P) 14 5' 2\" (1.575 m) 200 lb (90.7 kg) LMP SpO2 BMI OB Status Smoking Status 07/11/2018 (P) 98% 36.58 kg/m2 Having regular periods Never Smoker Vitals History BMI and BSA Data Body Mass Index Body Surface Area  
 36.58 kg/m 2 1.99 m 2 Preferred Pharmacy Pharmacy Name Phone Health system DRUG STORE [facility], [address] [place] [address] AT [address] [place] [address] Drive [phone] Your Updated Medication List  
  
   
This list is accurate as of 7/23/18  1:02 PM.  Always use your most recent med list.  
  
  
  
  
 ferrous sulfate 325 mg (65 mg iron) tablet Commonly known as:  Iron (Ferrous Sulfate) Take 1 Tab by mouth Daily (before breakfast) for 30 days. lisinopril 40 mg tablet Commonly known as:  Arnold Files Take 1.5 Tabs by mouth daily. Indications: hypertension  
  
 mometasone 220 mcg (30 doses) inhaler Commonly known as:  Marchia Brochure Take 1 Puff by inhalation two (2) times a day. Indications: MAINTENANCE THERAPY FOR ASTHMA  
  
 montelukast 10 mg tablet Commonly known as:  SINGULAIR Take 1 Tab by mouth daily. Indications: Allergic Rhinitis  
  
 potassium chloride 20 mEq tablet Commonly known as:  K-DUR, KLOR-CON Take 1 Tab by mouth two (2) times a day. Indications: hypokalemia PROAIR HFA 90 mcg/actuation inhaler Generic drug:  albuterol Take 2 Puffs by inhalation every four (4) hours as needed for Wheezing. Indications: BRONCHOSPASM PREVENTION ZyrTEC 10 mg tablet Generic drug:  cetirizine Take  by mouth. Prescriptions Sent to Pharmacy Refills  
 potassium chloride (K-DUR, KLOR-CON) 20 mEq tablet 0 Sig: Take 1 Tab by mouth two (2) times a day. Indications: hypokalemia Class: Normal  
 Pharmacy: [facility] [address] AT [address] [place] [address] Ph #: [phone] Route: Oral  
  
We Performed the Following CULTURE, URINE [id number] CPT(R)] IRON PROFILE [id number] CPT(R)] MAGNESIUM [id number] CPT(R)] REFERRAL TO OBSTETRICS AND GYNECOLOGY [REF51 Custom] Comments:  
 Menorrhagia causing Hb 7.4, uterine fibroids, ovarian cysts URINALYSIS W/ RFLX MICROSCOPIC [03067 CPT(R)] Follow-up Instructions Return in about 2 months (around 9/23/2018) for results, anemia, referral follow up, blood pressure. Referral Information Referral ID Referred By Referred To  
  
 [id number] [facility] [facility] [address] [place], [address] [facility] Visits Status Start Date End Date 1 New Request 7/23/18 7/23/19 If your referral has a status of pending review or denied, additional information will be sent to support the outcome of this decision. Patient Instructions Anemia From Heavy Bleeding: Care Instructions Your Care Instructions Anemia means that your body does not have enough red blood cells. Red blood cells carry oxygen around the body. When you have anemia, you may feel dizzy, tired, and weak. You may also feel your heart pounding. For some people, it's hard to focus and think clearly. One common cause of anemia is bleeding. Bleeding from ulcers, hemorrhoids, cancer, or other problems can cause anemia. It may also be caused by heavy menstrual periods. Your treatment may include iron pills. Iron helps your body make hemoglobin. Hemoglobin is the part of the red blood cell that carries oxygen. If you have severe anemia, you may need a blood transfusion to give you red blood cells as quickly as possible. Sometimes it takes several months to get iron levels back to normal. 
Follow-up care is a key part of your treatment and safety. Be sure to make and go to all appointments, and call your doctor if you are having problems. It's also a good idea to know your test results and keep a list of the medicines you take. How can you care for yourself at home? · Be safe with medicines. Take your medicines exactly as prescribed. Call your doctor if you think you are having a problem with your medicine. · Follow your doctor's advice about eating foods that have a lot of iron in them. These include red meat, shellfish, poultry, and eggs. They also include beans, raisins, whole-grain bread, and leafy green vegetables. · Steam your vegetables. This is the best way to prepare them if you want to get as much iron as possible. · Iron pills can cause constipation. If you take them, there are things you can do to avoid constipation. Drink plenty of fluids, eat foods with a lot of fiber, and exercise every day. When should you call for help? Call 911 anytime you think you may need emergency care. For example, call if: 
  · You passed out (lost consciousness).  
  · Your stools are maroon or very bloody.  
 Call your doctor now or seek immediate medical care if: 
  · You are short of breath.  
  · You have new or worse bleeding.  
  · You are dizzy or light-headed, or you feel like you may faint.  
 Watch closely for changes in your health, and be sure to contact your doctor if: 
  · You feel weaker or more tired than usual.  
  · You do not get better as expected. Where can you learn more? Go to http://tl-aguilar.info/. Enter L291 in the search box to learn more about \"Anemia From Heavy Bleeding: Care Instructions. \" Current as of: October 9, 2017 Content Version: 11.7 © 9673-9859 [employer], [address]. Care instructions adapted under license by [employer] (which disclaims liability or warranty for this information). If you have questions about a medical condition or this instruction, always ask your healthcare professional. [address] any warranty or liability for your use of this information. Introducing [facility]! Dear Francis Apa: Thank you for requesting a ImmuRx account. Our records indicate that you already have an active ImmuRx account. You can access your account anytime at [email or web address]. [email or web address] Technology Did you know that you can access your hospital and ER discharge instructions at any time in ImmuRx? You can also review all of your test results from your hospital stay or ER visit. Additional Information If you have questions, please visit the Frequently Asked Questions section of the ImmuRx website at [email or web address] Remember, ImmuRx is NOT to be used for urgent needs. For medical emergencies, dial 911. Now available from your iPhone and Android! Please provide this summary of care documentation to your next provider. Your primary care clinician is listed as Paco Doss. If you have any questions after today's visit, please call [phone].

## 2018-07-23 NOTE — PROGRESS NOTES
HISTORY OF PRESENT ILLNESS Diane Smith is a 38 y.o. female presents with Blood Pressure Check; Referral Follow Up; Results; and ED Follow-up (7-14-18; [facility] ED) Agree with nurse note. Pt with hypertension, thrombocytosis, obesity, elevated WBC, and vit d deficiency presents to the office with a BP of 124/76. For BP, pt uses 1.5 tabs of Lisinopril 40 mg daily, tolerating well. She has not been taking Norvasc for the past year but cannot recall why she stopped. Labs from 4/4/2018 reviewed. She was in the [facility] from 6/10/2018-6/24/2018. She did not need to use her motion sickness medicine while on the trip. Pt complains of burning and pain with urination and has been urinating more than 5 times a day, lower abd pain since returning from the [facility]. She wakes up 5-6 times a night to urinate. Pt went to ED on 7/14/2018 for constant frontal HA x2 weeks that progressively worsened since onset. Pt took Advil without improvement. Pt thought HA was HTN related. She also complained of heavy bleeding with large clots during her menstrual cycle, recent lightheadedness and back pain. Pt noted hx of dilation and curettage with Dr. Binta Dill due to heavy vaginal bleeding and thick endometrial stripe. Pt also has hx of uterine fibroids and has cysts on both overies. Pt denied previous hx of HA. Pt denied vaginal bleeding between her menstrual cycles. She uses 4 pads a day and saturates the pads. Dx'd with low hemoglobin (7.5) and potassium . Tx'd with IV fluids and hydralazine and potassium. Rx'd ferrous sulface 325 mg qam. Urine culture showed hematuria. The ultrasound reveals fibroid disease, which was the likely source of vaginal bleeding. Recommended pt follow up with GYN for treatment of her fibroid. Her BP was 172/84 and HR was 88 at time of discharge. She complains of constant fatigue and stays in the bed a lot. She also complains of continued dizziness.  \"I don't have the energy to do anything and I'm gonna get back in bed when I get home. \" 
 
Written by gilda Matute, as dictated by Dr. Suman Vidales DO. 
 
 
ROS Review of Systems negative except as noted above in HPI. ALLERGIES:   
Allergies Allergen Reactions  Bactrim [Sulfamethoxazole-Trimethoprim] Other (comments) GI Upset CURRENT MEDICATIONS:   
Outpatient Prescriptions Marked as Taking for the 7/23/18 encounter (Office Visit) with Risa Joseph DO Medication Sig Dispense Refill  potassium chloride (K-DUR, KLOR-CON) 20 mEq tablet Take 1 Tab by mouth two (2) times a day. Indications: hypokalemia 30 Tab 0  
 ferrous sulfate (IRON, FERROUS SULFATE,) 325 mg (65 mg iron) tablet Take 1 Tab by mouth Daily (before breakfast) for 30 days. 30 Tab 0  
 PROAIR HFA 90 mcg/actuation inhaler Take 2 Puffs by inhalation every four (4) hours as needed for Wheezing. Indications: BRONCHOSPASM PREVENTION 1 Inhaler 1  
 lisinopril (PRINIVIL, ZESTRIL) 40 mg tablet Take 1.5 Tabs by mouth daily. Indications: hypertension 45 Tab 11  
 montelukast (SINGULAIR) 10 mg tablet Take 1 Tab by mouth daily. Indications: Allergic Rhinitis 90 Tab 3 PAST MEDICAL HISTORY:   
Past Medical History:  
Diagnosis Date  Allergy, unspecified not elsewhere classified  Anemia  Ankle sprain 03/2009  
 bilateral.   [facility]  Asthma childhood  Breast mass 5/5/09 Left. benign.  Chickenpox childhood  Ectopic pregnancy 04/2010 Right. Dr. Aaliyah Porter  Essential hypertension, benign  Menorrhagia 08/2015 Dr. Ann Marie Wheeler  Ovarian cyst 2011 R. Dr. Aaliyah Porter  Pure hypercholesterolemia 05/2009  Thrombocytosis ([facility] [address] 05/2009  Tubal pregnancy 06/2011 Dr Aaliyah Porter  Vitamin D deficiency 05/2009 PAST SURGICAL HISTORY:   
Past Surgical History:  
Procedure Laterality Date  HX DILATION AND CURETTAGE  8/11/15 Dr. Ann Marie Wheeler.   due to heavy vag bleeding and thick endometrial stripe.  HX SALPINGO-OOPHORECTOMY Right 04/2010 Right. due to ectopic pregnancy. Dr. Tanvir Watkins.  HX TUBAL LIGATION  2002 Dr. Binta Dill FAMILY HISTORY:   
Family History Problem Relation Age of Onset  Diabetes Mother   
  type ll  Hypertension Mother  Diabetes Father   
  type ll SOCIAL HISTORY:   
Social History Social History  Marital status:  Spouse name: N/A  
 Number of children: N/A  
 Years of education: N/A Social History Main Topics  Smoking status: Never Smoker  Smokeless tobacco: Never Used  Alcohol use No  
 Drug use: No  
 Sexual activity: Yes  
  Partners: Male Birth control/ protection: Surgical  
   Comment: TL Other Topics Concern  None Social History Narrative IMMUNIZATIONS:   
There is no immunization history on file for this patient. PHYSICAL EXAMINATION Vital Signs Visit Vitals  /76 (BP 1 Location: Left arm, BP Patient Position: Sitting) Comment: taken manialy with large cuff  Pulse (P) 83  Temp (P) 98 °F (36.7 °C) (Temporal)  Resp (P) 14  
 Ht 5' 2\" (1.575 m)  Wt 200 lb (90.7 kg)  LMP 07/11/2018  SpO2 (P) 98%  BMI 36.58 kg/m2 Weight Metrics 7/23/2018 7/14/2018 4/4/2018 7/17/2017 7/6/2017 3/22/2017 3/16/2016 Weight 200 lb 200 lb 200 lb 8 oz 215 lb 3.2 oz 215 lb 6.4 oz 217 lb 8 oz 213 lb 3 oz BMI 36.58 kg/m2 36.58 kg/m2 36.67 kg/m2 39.36 kg/m2 39.39 kg/m2 39.78 kg/m2 38.98 kg/m2 General appearance - Well nourished. Well appearing. Well developed. No acute distress. Obese. Head - Normocephalic. Atraumatic. Eyes - pupils equal and reactive. Extraocular eye movements intact. Sclera anicteric. Mildly injected sclera. Ears - Hearing is grossly normal bilaterally. Nose - normal and patent. No polyps noted. No erythema. No discharge. Mouth - mucous membranes with adequate moisture.   Posterior pharynx normal with cobblestone appearance. No erythema, white exudate or obstruction. Neck - supple. Midline trachea. No carotid bruits noted bilaterally. No thyromegaly noted. Chest - clear to auscultation bilaterally anteriorly and posteriorly. No wheezes. No rales or rhonchi. Breath sounds are symmetrical bilaterally. Unlabored respirations. Heart - normal rate. Regular rhythm. Normal S1, S2. No murmur noted. No rubs, clicks or gallops noted. Abdomen - soft and distended. No masses or organomegaly. No rebound, rigidity or guarding. Bowel sounds normal x 4 quadrants. Suprapubic pain on palpation. Neurological - awake, alert and oriented to person, place, and time and event. Cranial nerves II through XII intact. Clear speech. Muscle strength is +5/5 x 4 extremities. Sensation is intact to light touch bilaterally. Steady gait. Heme/Lymph - peripheral pulses normal x 4 extremities. No peripheral edema is noted. Musculoskeletal - Intact x 4 extremities. Full ROM x 4 extremities. No pain with movement. Back exam - normal range of motion. No pain on palpation of the spinous processes in the cervical, thoracic, lumbar, sacral regions. No CVA tenderness. Skin - no rashes, erythema, ecchymosis, lacerations, abrasions, suspicious moles noted Psychological -   normal behavior, dress and thought processes. Good insight. Good eye contact. Normal affect. Appropriate mood. Normal speech. DATA REVIEWED Lab Results Component Value Date/Time WBC 8.3 07/14/2018 02:31 PM  
 HGB 7.5 (L) 07/14/2018 02:31 PM  
 HCT 27.4 (L) 07/14/2018 02:31 PM  
 PLATELET 364 (H) 16/52/5778 02:31 PM  
 MCV 63.9 (L) 07/14/2018 02:31 PM  
 
Lab Results Component Value Date/Time  Sodium 141 07/14/2018 02:31 PM  
 Potassium 3.3 (L) 07/14/2018 02:31 PM  
 Chloride 106 07/14/2018 02:31 PM  
 CO2 28 07/14/2018 02:31 PM  
 Anion gap 7 07/14/2018 02:31 PM  
 Glucose 88 07/14/2018 02:31 PM  
 BUN 10 07/14/2018 02:31 PM  
 Creatinine 0.96 07/14/2018 02:31 PM  
 BUN/Creatinine ratio 10 (L) 07/14/2018 02:31 PM  
 GFR est AA >60 07/14/2018 02:31 PM  
 GFR est non-AA >60 07/14/2018 02:31 PM  
 Calcium 8.6 07/14/2018 02:31 PM  
 Bilirubin, total 0.3 07/14/2018 02:31 PM  
 AST (SGOT) 9 (L) 07/14/2018 02:31 PM  
 Alk. phosphatase 93 07/14/2018 02:31 PM  
 Protein, total 7.8 07/14/2018 02:31 PM  
 Albumin 3.2 (L) 07/14/2018 02:31 PM  
 Globulin 4.6 (H) 07/14/2018 02:31 PM  
 A-G Ratio 0.7 (L) 07/14/2018 02:31 PM  
 ALT (SGPT) 16 07/14/2018 02:31 PM  
 
Lab Results Component Value Date/Time Cholesterol, total 205 (H) 03/29/2017 08:28 AM  
 HDL Cholesterol 77 03/29/2017 08:28 AM  
 LDL, calculated 118 (H) 03/29/2017 08:28 AM  
 VLDL, calculated 10 03/29/2017 08:28 AM  
 Triglyceride 51 03/29/2017 08:28 AM  
 
Lab Results Component Value Date/Time VITAMIN D, 25-HYDROXY 8.8 (L) 03/29/2017 08:28 AM  
   
Lab Results Component Value Date/Time Hemoglobin A1c 5.6 08/27/2015 08:56 AM  
 
Lab Results Component Value Date/Time TSH 0.872 03/29/2017 08:28 AM  
 
 
Lab Results Component Value Date/Time Microalb/Creat ratio (ug/mg creat.) 67.7 (H) 04/04/2018 03:31 PM  
 
 
 
ASSESSMENT and PLAN 
 
  ICD-10-CM ICD-9-CM 1. Iron deficiency anemia due to chronic blood loss D50.0 280.0 IRON PROFILE REFERRAL TO OBSTETRICS AND GYNECOLOGY  
 due to heavy menses with uterine fibroids vs other 2. Essential hypertension, benign I10 401.1   
 improved 3. Chronic fatigue R53.82 780.79 MAGNESIUM  
   REFERRAL TO OBSTETRICS AND GYNECOLOGY  
 due to vit D vs menorrhagia vs severe anemia vs ?UTI vs other 4. Dizziness R42 780.4 MAGNESIUM  
   REFERRAL TO OBSTETRICS AND GYNECOLOGY  
 due to severe anemia vs hypokalemia vs ?UTI vs other 5. Nocturia more than twice per night R35.1 788.43 URINALYSIS W/ RFLX MICROSCOPIC  
   CULTURE, URINE  
 since returning from [place] trip x 2 weeks 6.  Hematuria, gross R31.0 599.71   
 due to UTI vs menses vs other 7. Thrombocytosis (HCC) D47.3 238.71   
8. Obesity, Class II, BMI 35-39.9 E66.9 278.00   
9. Other elevated white blood cell (WBC) count D72.828 288.69   
 resolved 10. Cysts of both ovaries N83.201 620.2 REFERRAL TO OBSTETRICS AND GYNECOLOGY  
 N83.202 11. Vitamin D deficiency E55.9 268.9 12. Hypokalemia E87.6 276.8 potassium chloride (K-DUR, KLOR-CON) 20 mEq tablet Discussed the patient's BMI with her. The BMI follow up plan is as follows: I have counseled this patient on diet and exercise regimens. Decrease carbohydrates (white foods, sweet foods, sweet drinks and alcohol), increase green leafy vegetables and protein (lean meats and beans) with each meal.  Avoid fried foods. Eat 3-5 small meals daily. Do not skip meals. Increase water intake. Increase physical activity to 30 minutes daily for health benefit or 60 minutes daily to prevent weight regain, as tolerated. Get 7-8 hours uninterrupted sleep nightly. Chart reviewed and updated. Continue current medications and care. Continue with Iron supplements. If Hgb stays in the 7s, I will refer pt to hematologist. Encourgaed pt to follow up with GYN regarding continued vaginal bleeding and anemia. Prescriptions written and sent to pharmacy; medication side effects discussed. Potassium Chloride 20mEq. Most recent tests reviewed from 4/4/2018. Recheck pertinent labs today. Recent office visit notes from ED reviewed. Referrals given. GYN. Counseled patient on health concerns:  Dizziness, fatigue, BP, vaginal bleeding, vit d deficiency, Relevant handouts given and discussed with patient. Immunizations noted. Offered empathy, support, legitimation, prayers, partnership to patient. Praised patient for progress. Follow-up Disposition: 
Return in about 2 months (around 9/23/2018) for results, anemia, referral follow up, blood pressure. Patient was offered a choice/choices in the treatment plan today. Patient expresses understanding of the plan and agrees with recommendations. More than 40 mins spent face to face with patient and more than 50% of this time spent in counseling and coordinating care. Written by gilda Tafoya, as dictated by Dr. Dru Leary DO. Documentation True and Accepted by Jessica Judd. Suhas León. Patient Instructions Anemia From Heavy Bleeding: Care Instructions Your Care Instructions Anemia means that your body does not have enough red blood cells. Red blood cells carry oxygen around the body. When you have anemia, you may feel dizzy, tired, and weak. You may also feel your heart pounding. For some people, it's hard to focus and think clearly. One common cause of anemia is bleeding. Bleeding from ulcers, hemorrhoids, cancer, or other problems can cause anemia. It may also be caused by heavy menstrual periods. Your treatment may include iron pills. Iron helps your body make hemoglobin. Hemoglobin is the part of the red blood cell that carries oxygen. If you have severe anemia, you may need a blood transfusion to give you red blood cells as quickly as possible. Sometimes it takes several months to get iron levels back to normal. 
Follow-up care is a key part of your treatment and safety. Be sure to make and go to all appointments, and call your doctor if you are having problems. It's also a good idea to know your test results and keep a list of the medicines you take. How can you care for yourself at home? · Be safe with medicines. Take your medicines exactly as prescribed. Call your doctor if you think you are having a problem with your medicine. · Follow your doctor's advice about eating foods that have a lot of iron in them. These include red meat, shellfish, poultry, and eggs. They also include beans, raisins, whole-grain bread, and leafy green vegetables. · Steam your vegetables.  This is the best way to prepare them if you want to get as much iron as possible. · Iron pills can cause constipation. If you take them, there are things you can do to avoid constipation. Drink plenty of fluids, eat foods with a lot of fiber, and exercise every day. When should you call for help? Call 911 anytime you think you may need emergency care. For example, call if: 
  · You passed out (lost consciousness).  
  · Your stools are maroon or very bloody.  
 Call your doctor now or seek immediate medical care if: 
  · You are short of breath.  
  · You have new or worse bleeding.  
  · You are dizzy or light-headed, or you feel like you may faint.  
 Watch closely for changes in your health, and be sure to contact your doctor if: 
  · You feel weaker or more tired than usual.  
  · You do not get better as expected. Where can you learn more? Go to http://tl-aguilar.info/. Enter T571 in the search box to learn more about \"Anemia From Heavy Bleeding: Care Instructions. \" Current as of: October 9, 2017 Content Version: 11.7 © 0613-7279 [employer]. Care instructions adapted under license by [employer] (which disclaims liability or warranty for this information). If you have questions about a medical condition or this instruction, always ask your healthcare professional. [address] any warranty or liability for your use of this information.

## 2018-07-23 NOTE — PROGRESS NOTES
Rafy Willis  Identified pt with two pt identifiers(name and ). Chief Complaint   Patient presents with    Blood Pressure Check    Referral Follow Up    Results    ED Follow-up     18; [facility] ED       1. Have you been to the ER, urgent care clinic since your last visit? Hospitalized since your last visit? [facility] ED on 2018    2. Have you seen or consulted any other health care providers outside of the [address] [place] [address] [place] since your last visit? Include any pap smears or colon screening. No    In the event something were to happen to you and you were unable to speak on your behalf, do you have an Advance Directive/ Living Will in place stating your wishes? NO    If yes, do we have a copy on file NO    If no, would you like information:   Pt accepted. Medication reconciliation up to date and corrected with patient at this time. Today's provider has been notified of reason for visit, vitals and flowsheets obtained on patients. Reviewed record in preparation for visit, huddled with provider and have obtained necessary documentation. There are no preventive care reminders to display for this patient.     Wt Readings from Last 3 Encounters:   18 200 lb (90.7 kg)   18 200 lb (90.7 kg)   18 200 lb 8 oz (90.9 kg)     Temp Readings from Last 3 Encounters:   18 98.4 °F (36.9 °C)   18 98 °F (36.7 °C) (Oral)   17 98.1 °F (36.7 °C)     BP Readings from Last 3 Encounters:   18 172/84   18 130/80   17 151/85     Pulse Readings from Last 3 Encounters:   18 88   18 92   17 89     Vitals:    18 1105 18 1117   BP: (!) (P) 153/92 124/76   Pulse: (P) 83    Resp: (P) 14    Temp: (P) 98 °F (36.7 °C)    TempSrc: (P) Temporal    SpO2: (P) 98%    Weight: 200 lb (90.7 kg)    Height: 5' 2\" (1.575 m)    PainSc:   0 - No pain    LMP: 2018         Learning Assessment:  :     Learning Assessment 2018   PRIMARY LEARNER Patient   PRIMARY LANGUAGE ENGLISH   LEARNER PREFERENCE PRIMARY DEMONSTRATION   ANSWERED BY Patient   RELATIONSHIP SELF       Depression Screening:  :     PHQ over the last two weeks 6/23/2015   Little interest or pleasure in doing things Not at all   Feeling down, depressed, irritable, or hopeless Not at all   Total Score PHQ 2 0       Fall Risk Assessment:  :     Fall Risk Assessment, last 12 mths 4/4/2018   Able to walk? Yes   Fall in past 12 months? No       Abuse Screening:  :     Abuse Screening Questionnaire 4/4/2018   Do you ever feel afraid of your partner? N   Are you in a relationship with someone who physically or mentally threatens you? N   Is it safe for you to go home?  Y       ADL Screening:  :     ADL Assessment 4/4/2018   Feeding yourself No Help Needed   Getting from bed to chair No Help Needed   Getting dressed No Help Needed   Bathing or showering No Help Needed   Walk across the room (includes cane/walker) No Help Needed   Using the telphone No Help Needed   Taking your medications No Help Needed   Preparing meals No Help Needed   Managing money (expenses/bills) No Help Needed   Moderately strenuous housework (laundry) No Help Needed   Shopping for personal items (toiletries/medicines) No Help Needed   Shopping for groceries No Help Needed   Driving No Help Needed   Climbing a flight of stairs No Help Needed   Getting to places beyond walking distances No Help Needed

## 2018-07-24 LAB
25(OH)D3+25(OH)D2 SERPL-MCNC: 12.3 NG/ML (ref 30–100)
ALBUMIN SERPL-MCNC: 3.8 G/DL (ref 3.5–5.5)
ALBUMIN/GLOB SERPL: 1.2 {RATIO} (ref 1.2–2.2)
ALP SERPL-CCNC: 89 IU/L (ref 39–117)
ALT SERPL-CCNC: 10 IU/L (ref 0–32)
APPEARANCE UR: CLEAR
AST SERPL-CCNC: 11 IU/L (ref 0–40)
BACTERIA #/AREA URNS HPF: ABNORMAL /[HPF]
BILIRUB SERPL-MCNC: <0.2 MG/DL (ref 0–1.2)
BILIRUB UR QL STRIP: NEGATIVE
BUN SERPL-MCNC: 12 MG/DL (ref 6–24)
BUN/CREAT SERPL: 12 (ref 9–23)
CALCIUM SERPL-MCNC: 9.5 MG/DL (ref 8.7–10.2)
CASTS URNS QL MICRO: ABNORMAL /LPF
CHLORIDE SERPL-SCNC: 101 MMOL/L (ref 96–106)
CO2 SERPL-SCNC: 23 MMOL/L (ref 20–29)
COLOR UR: YELLOW
CREAT SERPL-MCNC: 1.01 MG/DL (ref 0.57–1)
EPI CELLS #/AREA URNS HPF: ABNORMAL /HPF
ERYTHROCYTE [DISTWIDTH] IN BLOOD BY AUTOMATED COUNT: 20.9 % (ref 12.3–15.4)
GLOBULIN SER CALC-MCNC: 3.2 G/DL (ref 1.5–4.5)
GLUCOSE SERPL-MCNC: 125 MG/DL (ref 65–99)
GLUCOSE UR QL: NEGATIVE
HCT VFR BLD AUTO: 27.3 % (ref 34–46.6)
HGB BLD-MCNC: 7.6 G/DL (ref 11.1–15.9)
HGB UR QL STRIP: NEGATIVE
IRON SATN MFR SERPL: 84 % (ref 15–55)
IRON SERPL-MCNC: 337 UG/DL (ref 27–159)
KETONES UR QL STRIP: NEGATIVE
LEUKOCYTE ESTERASE UR QL STRIP: NEGATIVE
MAGNESIUM SERPL-MCNC: 2 MG/DL (ref 1.6–2.3)
MCH RBC QN AUTO: 17.6 PG (ref 26.6–33)
MCHC RBC AUTO-ENTMCNC: 27.8 G/DL (ref 31.5–35.7)
MCV RBC AUTO: 63 FL (ref 79–97)
MICRO URNS: ABNORMAL
MUCOUS THREADS URNS QL MICRO: PRESENT
NITRITE UR QL STRIP: NEGATIVE
PH UR STRIP: 6.5 [PH] (ref 5–7.5)
PLATELET # BLD AUTO: 529 X10E3/UL (ref 150–379)
POTASSIUM SERPL-SCNC: 4.4 MMOL/L (ref 3.5–5.2)
PROT SERPL-MCNC: 7 G/DL (ref 6–8.5)
PROT UR QL STRIP: ABNORMAL
RBC # BLD AUTO: 4.32 X10E6/UL (ref 3.77–5.28)
RBC #/AREA URNS HPF: ABNORMAL /HPF
SODIUM SERPL-SCNC: 140 MMOL/L (ref 134–144)
SP GR UR: 1.02 (ref 1–1.03)
TIBC SERPL-MCNC: 402 UG/DL (ref 250–450)
TSH SERPL DL<=0.005 MIU/L-ACNC: 1.15 UIU/ML (ref 0.45–4.5)
UIBC SERPL-MCNC: 65 UG/DL (ref 131–425)
UROBILINOGEN UR STRIP-MCNC: 0.2 MG/DL (ref 0.2–1)
WBC # BLD AUTO: 8.8 X10E3/UL (ref 3.4–10.8)
WBC #/AREA URNS HPF: ABNORMAL /HPF

## 2018-07-25 LAB — BACTERIA UR CULT: ABNORMAL

## 2018-08-20 DIAGNOSIS — N30.01 ACUTE CYSTITIS WITH HEMATURIA: Primary | ICD-10-CM

## 2018-08-20 DIAGNOSIS — D50.0 IRON DEFICIENCY ANEMIA DUE TO CHRONIC BLOOD LOSS: Primary | ICD-10-CM

## 2018-08-20 RX ORDER — CEPHALEXIN 500 MG/1
500 CAPSULE ORAL 2 TIMES DAILY
Qty: 28 CAP | Refills: 0 | Status: SHIPPED | OUTPATIENT
Start: 2018-08-20 | End: 2018-09-03

## 2018-09-27 ENCOUNTER — DOCUMENTATION ONLY (OUTPATIENT)
Dept: ONCOLOGY | Age: 43
End: 2018-09-27

## 2018-10-01 ENCOUNTER — OFFICE VISIT (OUTPATIENT)
Dept: FAMILY MEDICINE CLINIC | Age: 43
End: 2018-10-01

## 2018-10-01 VITALS
BODY MASS INDEX: 33.84 KG/M2 | DIASTOLIC BLOOD PRESSURE: 80 MMHG | TEMPERATURE: 98.1 F | HEIGHT: 64 IN | RESPIRATION RATE: 15 BRPM | SYSTOLIC BLOOD PRESSURE: 156 MMHG | WEIGHT: 198.2 LBS | HEART RATE: 78 BPM | OXYGEN SATURATION: 100 %

## 2018-10-01 DIAGNOSIS — Z23 ENCOUNTER FOR IMMUNIZATION: ICD-10-CM

## 2018-10-01 DIAGNOSIS — D25.9 UTERINE LEIOMYOMA, UNSPECIFIED LOCATION: ICD-10-CM

## 2018-10-01 DIAGNOSIS — N30.00 ACUTE CYSTITIS WITHOUT HEMATURIA: ICD-10-CM

## 2018-10-01 DIAGNOSIS — I10 ESSENTIAL HYPERTENSION, BENIGN: ICD-10-CM

## 2018-10-01 DIAGNOSIS — E66.9 OBESITY, CLASS I, BMI 30-34.9: ICD-10-CM

## 2018-10-01 DIAGNOSIS — E55.9 VITAMIN D DEFICIENCY: ICD-10-CM

## 2018-10-01 DIAGNOSIS — R42 DIZZINESS: ICD-10-CM

## 2018-10-01 DIAGNOSIS — R53.83 OTHER FATIGUE: ICD-10-CM

## 2018-10-01 DIAGNOSIS — D50.0 IRON DEFICIENCY ANEMIA DUE TO CHRONIC BLOOD LOSS: Primary | ICD-10-CM

## 2018-10-01 DIAGNOSIS — E87.6 HYPOKALEMIA: ICD-10-CM

## 2018-10-01 DIAGNOSIS — R63.4 WEIGHT LOSS: ICD-10-CM

## 2018-10-01 RX ORDER — LANOLIN ALCOHOL/MO/W.PET/CERES
CREAM (GRAM) TOPICAL
COMMUNITY

## 2018-10-01 NOTE — MR AVS SNAPSHOT
[address] [place] [address] 
[address] [place]ike Goels [id number] 
[phone] Patient: Disha Salter MRN:  [id number] Visit Information Date & Time Provider Department Dept. Phone Encounter #  
 10/1/2018 12:00 PM DO Brian MendiolacalixtoPierce [phone] [id number] Upcoming Health Maintenance Date Due Influenza Age 5 to Adult 2018 Pneumococcal 19-64 Highest Risk (2 of 3 - PCV13) 10/31/2018* PAP AKA CERVICAL CYTOLOGY 3/22/2020 DTaP/Tdap/Td series (2 - Td) 3/22/2027 *Topic was postponed. The date shown is not the original due date. Allergies as of 10/1/2018  Review Complete On: 10/1/2018 By: Genia Swartz DO Severity Noted Reaction Type Reactions Bactrim [Sulfamethoxazole-trimethoprim]  2010    Other (comments) GI Upset Current Immunizations  Reviewed on 2018 No immunizations on file. Not reviewed this visit You Were Diagnosed With   
  
 Codes Comments Iron deficiency anemia due to chronic blood loss    -  Primary ICD-10-CM: D50.0 ICD-9-CM: 280.0 Essential hypertension, benign     ICD-10-CM: I10 
ICD-9-CM: 401.1 Acute cystitis without hematuria     ICD-10-CM: N30.00 ICD-9-CM: 595.0 Vitamin D deficiency     ICD-10-CM: E55.9 ICD-9-CM: 268.9 Hypokalemia     ICD-10-CM: E87.6 ICD-9-CM: 276.8 Obesity, Class I, BMI 30-34.9     ICD-10-CM: E66.9 ICD-9-CM: 278.00 Encounter for immunization     ICD-10-CM: Q01 ICD-9-CM: V03.89 Weight loss     ICD-10-CM: R63.4 ICD-9-CM: 783.21 2# due to increased exercise and working 2 jobs Other fatigue     ICD-10-CM: R53.83 ICD-9-CM: 780.79 due to iron def anemia vs low potassium vs vit d vs UTI vs other, resolved with supplements and diet changes Dizziness     ICD-10-CM: E60 ICD-9-CM: 780.4 due to severe anemia vs hypokalemia, resolved since taking supplements Uterine leiomyoma, unspecified location     ICD-10-CM: D25.9 ICD-9-CM: 218.9 Vitals BP Pulse Temp Resp Height(growth percentile) Weight(growth percentile) 156/80 (BP 1 Location: Left arm, BP Patient Position: Sitting) 78 98.1 °F (36.7 °C) (Oral) 15 5' 3.5\" (1.613 m) 198 lb 3.2 oz (89.9 kg) LMP SpO2 BMI OB Status Smoking Status 09/01/2018 (Approximate) 100% 34.56 kg/m2 Having regular periods Never Smoker Vitals History BMI and BSA Data Body Mass Index Body Surface Area 34.56 kg/m 2 2.01 m 2 Preferred Pharmacy Pharmacy Name Phone [facility], [address] [place] [address] AT [address] [place] [address] Drive [phone] Your Updated Medication List  
  
   
This list is accurate as of 10/1/18  1:21 PM.  Always use your most recent med list.  
  
  
  
  
 ferrous sulfate 325 mg (65 mg iron) tablet Take  by mouth Daily (before breakfast). lisinopril 40 mg tablet Commonly known as:  Bonnita Silk Take 1.5 Tabs by mouth daily. Indications: hypertension  
  
 mometasone 220 mcg (30 doses) inhaler Commonly known as:  Charlotte Geri Take 1 Puff by inhalation two (2) times a day. Indications: MAINTENANCE THERAPY FOR ASTHMA  
  
 montelukast 10 mg tablet Commonly known as:  SINGULAIR Take 1 Tab by mouth daily. Indications: Allergic Rhinitis  
  
 potassium chloride 20 mEq tablet Commonly known as:  K-DUR, KLOR-CON Take 1 Tab by mouth two (2) times a day. Indications: hypokalemia PROAIR HFA 90 mcg/actuation inhaler Generic drug:  albuterol Take 2 Puffs by inhalation every four (4) hours as needed for Wheezing. Indications: BRONCHOSPASM PREVENTION ZyrTEC 10 mg tablet Generic drug:  cetirizine Take  by mouth. We Performed the Following CBC W/O DIFF [39873 CPT(R)] IRON [id number] CPT(R)] METABOLIC PANEL, COMPREHENSIVE [87948 CPT(R)] URINALYSIS W/ RFLX MICROSCOPIC [80056 CPT(R)] VITAMIN D, 25 HYDROXY [id number] CPT(R)] Introducing [facility]! Dear Matthew Monk: Thank you for requesting a [employer] account. Our records indicate that you already have an active [employer] account. You can access your account anytime at [email or web address] Did you know that you can access your hospital and ER discharge instructions at any time in [employer]? You can also review all of your test results from your hospital stay or ER visit. Additional Information If you have questions, please visit the Frequently Asked Questions section of the [employer] website at [email or web address] Remember, [employer] is NOT to be used for urgent needs. For medical emergencies, dial 911. Now available from your iPhone and Android! Please provide this summary of care documentation to your next provider. Your primary care clinician is listed as Jarek Navarro. If you have any questions after today's visit, please call [phone].

## 2018-10-01 NOTE — Clinical Note
Brandon Silvestre! This dear young lady missed her appt with you because she did not know she had it. Her phone number is [phone]. Please make sure your records reflect this. She is feeling better since starting OTC iron and potassium and will see her GYN this month. I plan to recheck her labs today. Thanks for your follow up as well.   Dalton Arroyo

## 2018-10-01 NOTE — PROGRESS NOTES
HISTORY OF PRESENT ILLNESS Dano Naranjo is a 38 y.o. female presents with Blood Pressure Check; Referral Follow Up (GYN; has an appointment with Dr. Laura Naqvi this month); Results; and Anemia (F/U) Agree with nurse note. Pt with obesity, hypertension, and vit d deficiency presents to the office with a BP of 156/80. Patient denies vision changes, headaches, dizziness, chest pain, SOB, or swelling. For BP, she uses Lisinopril 40 mg daily. It was rx'd as 1.5 tabs daily, but pt did not realize. Weight is 198 lbs, down 2 lbs since 7/23/2018. She has been exercising more and works two jobs. She drinks 16 oz of water daily. To recall, at visit on 7/23/2018 pt's Hgb was 7.6 and she had extremely low energy. She had recent heavy bleeding with large clots during her menstrual cycle and lightheadedness. Pt also has hx of uterine fibroids and has cysts on both overies. She was referred to her GYN and hematologist, but she has not yet gone. Per chart review, she had a missed appointment with hematologist, Dr. Paola Zurita but she did not know about this appointment. She has an appointment with GYN, Dr. Laura Naqvi this month. She reports she is feeling much better since the last visit and her sxs have resolved. She has been taking Ferrous Sulfate 325 mg and Potassium Chloride 20 mEq daily. She has also been exercising and eating less carbs. At last visit, pt was also complaining of urinary frequency. Her labs showed UTI and a rx for antibiotics was sent to pharmacy. Pt reports she did not  the antibiotic from the pharmacy but her sxs have resolved. Linford Greg Written by gilda Dodd, as dictated by Dr. Duncan Arreaga DO. 
 
ROS Review of Systems negative except as noted above in HPI. ALLERGIES:   
Allergies Allergen Reactions  Bactrim [Sulfamethoxazole-Trimethoprim] Other (comments) GI Upset CURRENT MEDICATIONS:   
 Outpatient Prescriptions Marked as Taking for the 10/1/18 encounter (Office Visit) with Skinny Erwin DO Medication Sig Dispense Refill  ferrous sulfate 325 mg (65 mg iron) tablet Take  by mouth Daily (before breakfast).  potassium chloride (K-DUR, KLOR-CON) 20 mEq tablet Take 1 Tab by mouth two (2) times a day. Indications: hypokalemia 30 Tab 0  
 PROAIR HFA 90 mcg/actuation inhaler Take 2 Puffs by inhalation every four (4) hours as needed for Wheezing. Indications: BRONCHOSPASM PREVENTION 1 Inhaler 1  
 lisinopril (PRINIVIL, ZESTRIL) 40 mg tablet Take 1.5 Tabs by mouth daily. Indications: hypertension 45 Tab 11  
 montelukast (SINGULAIR) 10 mg tablet Take 1 Tab by mouth daily. Indications: Allergic Rhinitis 90 Tab 3  
 mometasone (ASMANEX TWISTHALER) 220 mcg (30 doses) inhaler Take 1 Puff by inhalation two (2) times a day. Indications: MAINTENANCE THERAPY FOR ASTHMA 30 Cap 5 PAST MEDICAL HISTORY:   
Past Medical History:  
Diagnosis Date  Allergy, unspecified not elsewhere classified  Anemia  Ankle sprain 03/2009  
 bilateral.  Dr. Polina Crystal  Asthma childhood  Breast mass 5/5/09 Left. benign.  Chickenpox childhood  Ectopic pregnancy 04/2010 Right. Dr. Javier Alvarado  Essential hypertension, benign  Menorrhagia 08/2015 Dr. Jennifer Grayson  Ovarian cyst 2011 R. Dr. Javier Alvarado  Pure hypercholesterolemia 05/2009  Thrombocytosis ([address] 05/2009  Tubal pregnancy 06/2011 Dr Javier Alvarado  Vitamin D deficiency 05/2009 PAST SURGICAL HISTORY:   
Past Surgical History:  
Procedure Laterality Date  HX DILATION AND CURETTAGE  8/11/15 Dr. Jennifer Grayson. due to heavy vag bleeding and thick endometrial stripe.  HX SALPINGO-OOPHORECTOMY Right 04/2010 Right. due to ectopic pregnancy. Dr. Javier Alvarado.  HX TUBAL LIGATION  2002 Dr. Jennifer Grayson FAMILY HISTORY:   
Family History Problem Relation Age of Onset  Diabetes Mother   
  type ll  Hypertension Mother  Diabetes Father   
  type ll SOCIAL HISTORY:   
Social History Social History  Marital status:  Spouse name: N/A  
 Number of children: N/A  
 Years of education: N/A Social History Main Topics  Smoking status: Never Smoker  Smokeless tobacco: Never Used  Alcohol use No  
 Drug use: No  
 Sexual activity: Yes  
  Partners: Male Birth control/ protection: Surgical  
   Comment: TL Other Topics Concern  None Social History Narrative IMMUNIZATIONS:   
There is no immunization history on file for this patient. PHYSICAL EXAMINATION Vital Signs Visit Vitals  /80 (BP 1 Location: Left arm, BP Patient Position: Sitting) Comment: taken manually  Pulse 78  Temp 98.1 °F (36.7 °C) (Oral)  Resp 15  Ht 5' 3.5\" (1.613 m)  Wt 198 lb 3.2 oz (89.9 kg)  LMP 09/01/2018 (Approximate)  SpO2 100%  BMI 34.56 kg/m2 Weight Metrics 10/1/2018 7/23/2018 7/14/2018 4/4/2018 7/17/2017 7/6/2017 3/22/2017 Weight 198 lb 3.2 oz 200 lb 200 lb 200 lb 8 oz 215 lb 3.2 oz 215 lb 6.4 oz 217 lb 8 oz BMI 34.56 kg/m2 36.58 kg/m2 36.58 kg/m2 36.67 kg/m2 39.36 kg/m2 39.39 kg/m2 39.78 kg/m2 General appearance - Well nourished. Well appearing. Well developed. No acute distress. Obese. Head - Normocephalic. Atraumatic. Eyes - pupils equal and reactive. Extraocular eye movements intact. Sclera anicteric. Mildly injected sclera. Ears - Hearing is grossly normal bilaterally. Nose - normal and patent. No polyps noted. No erythema. No discharge. Mouth - mucous membranes with decreased oral moisture. Posterior pharynx normal with cobblestone appearance. No erythema, white exudate or obstruction. Neck - supple. Midline trachea. No carotid bruits noted bilaterally. No thyromegaly noted. Chest - clear to auscultation bilaterally anteriorly and posteriorly. No wheezes. No rales or rhonchi. Breath sounds are symmetrical bilaterally. Unlabored respirations. Heart - normal rate. Regular rhythm. Normal S1, S2. No murmur noted. No rubs, clicks or gallops noted. Abdomen - soft and distended. No masses or organomegaly. No rebound, rigidity or guarding. Bowel sounds normal x 4 quadrants. No tenderness noted. Neurological - awake, alert and oriented to person, place, and time and event. Cranial nerves II through XII intact. Clear speech. Muscle strength is +5/5 x 4 extremities. Sensation is intact to light touch bilaterally. Steady gait. Heme/Lymph - peripheral pulses normal x 4 extremities. No peripheral edema is noted. Musculoskeletal - Intact x 4 extremities. Full ROM x 4 extremities. No pain with movement. Back exam - normal range of motion. No pain on palpation of the spinous processes in the cervical, thoracic, lumbar, sacral regions. No CVA tenderness. Skin - no rashes, erythema, ecchymosis, lacerations, abrasions, suspicious moles noted Psychological -   normal behavior, dress and thought processes. Good insight. Good eye contact. Normal affect. Appropriate mood. Normal speech. DATA REVIEWED Lab Results Component Value Date/Time WBC 8.8 07/23/2018 02:40 PM  
 HGB 7.6 (L) 07/23/2018 02:40 PM  
 HCT 27.3 (L) 07/23/2018 02:40 PM  
 PLATELET 888 (H) 91/11/9437 02:40 PM  
 MCV 63 (L) 07/23/2018 02:40 PM  
 
Lab Results Component Value Date/Time  Sodium 140 07/23/2018 02:40 PM  
 Potassium 4.4 07/23/2018 02:40 PM  
 Chloride 101 07/23/2018 02:40 PM  
 CO2 23 07/23/2018 02:40 PM  
 Anion gap 7 07/14/2018 02:31 PM  
 Glucose 125 (H) 07/23/2018 02:40 PM  
 BUN 12 07/23/2018 02:40 PM  
 Creatinine 1.01 (H) 07/23/2018 02:40 PM  
 BUN/Creatinine ratio 12 07/23/2018 02:40 PM  
 GFR est AA 78 07/23/2018 02:40 PM  
 GFR est non-AA 68 07/23/2018 02:40 PM  
 Calcium 9.5 07/23/2018 02:40 PM  
 Bilirubin, total <0.2 07/23/2018 02:40 PM  
 AST (SGOT) 11 07/23/2018 02:40 PM  
 Alk. phosphatase 89 07/23/2018 02:40 PM  
 Protein, total 7.0 07/23/2018 02:40 PM  
 Albumin 3.8 07/23/2018 02:40 PM  
 Globulin 4.6 (H) 07/14/2018 02:31 PM  
 A-G Ratio 1.2 07/23/2018 02:40 PM  
 ALT (SGPT) 10 07/23/2018 02:40 PM  
 
Lab Results Component Value Date/Time Cholesterol, total 205 (H) 03/29/2017 08:28 AM  
 HDL Cholesterol 77 03/29/2017 08:28 AM  
 LDL, calculated 118 (H) 03/29/2017 08:28 AM  
 VLDL, calculated 10 03/29/2017 08:28 AM  
 Triglyceride 51 03/29/2017 08:28 AM  
 
Lab Results Component Value Date/Time VITAMIN D, 25-HYDROXY 12.3 (L) 07/23/2018 02:40 PM  
   
Lab Results Component Value Date/Time Hemoglobin A1c 5.6 08/27/2015 08:56 AM  
 
Lab Results Component Value Date/Time TSH 1.150 07/23/2018 02:40 PM  
 
 
Lab Results Component Value Date/Time Microalb/Creat ratio (ug/mg creat.) 67.7 (H) 04/04/2018 03:31 PM  
 
 
 
ASSESSMENT and PLAN 
 
  ICD-10-CM ICD-9-CM 1. Iron deficiency anemia due to chronic blood loss D50.0 280.0 ferrous sulfate 325 mg (65 mg iron) tablet CBC W/O DIFF  
   IRON 2. Essential hypertension, benign O49 659.3 METABOLIC PANEL, COMPREHENSIVE URINALYSIS W/ RFLX MICROSCOPIC 3. Acute cystitis without hematuria N30.00 595.0 URINALYSIS W/ RFLX MICROSCOPIC 4. Vitamin D deficiency E55.9 268.9 VITAMIN D, 25 HYDROXY 5. Hypokalemia E87.6 276.8 6. Obesity, Class I, BMI 30-34.9 E66.9 278.00   
7. Encounter for immunization Z23 V03.89   
8. Weight loss R63.4 783.21 CBC W/O DIFF  
 2# due to increased exercise and working 2 jobs 9. Other fatigue R87.16 995.52 METABOLIC PANEL, COMPREHENSIVE  
   CBC W/O DIFF  
 due to iron def anemia vs low potassium vs vit d vs UTI vs other, resolved with supplements and diet changes 10.  Dizziness R42 780.4 CBC W/O DIFF  
 due to severe anemia vs hypokalemia, resolved since taking supplements 11. Uterine leiomyoma, unspecified location D25.9 218.9 Discussed the patient's BMI with her. The BMI follow up plan is as follows: I have counseled this patient on diet and exercise regimens. Decrease carbohydrates (white foods, sweet foods, sweet drinks and alcohol), increase green leafy vegetables and protein (lean meats and beans) with each meal.  Avoid fried foods. Eat 3-5 small meals daily. Do not skip meals. Increase water intake to 3 bottles daily (1 with each meal). Increase physical activity to 30 minutes daily for health benefit or 60 minutes daily to prevent weight regain, as tolerated. Get 7-8 hours uninterrupted sleep nightly. Chart reviewed and updated. Staff message sent to Dr. Gurpreet Lucero regarding pt's missed appointment and provided him with pt's new phone number. Continue current medications and care. Increase Lisinopril from 1 tab daily to 1.5 tabs daily. Stop Ferrous Sulfate due to high iron level. Okay to wait to see Dr. Gurpreet Lucero until lab results come back. Prescriptions written and sent to pharmacy; medication side effects discussed. Most recent tests reviewed from 7/23/2018. Recheck pertinent labs today. Referrals given; patient urged to keep appointments with specialists. Counseled patient on health concerns:  Anemia, fatigue, dizziness, weight management, fibroids, and hypertension. Relevant handouts given and discussed with patient. Immunizations noted; Advise flu vaccine between the months September to December. Offered empathy, support, legitimation, prayers, partnership to patient. Praised patient for progress. Follow-up Disposition: 
Return in about 3 months (around 1/1/2019) for BP, Referral F/U, Results. Patient was offered a choice/choices in the treatment plan today. Patient expresses understanding of the plan and agrees with recommendations. Patient declines any additional handouts. Patient is satisfied with previous handouts received from our office Written by gilda Raymond, as dictated by Dr. Pretty Betts DO. Documentation True and Accepted by Prudence Mott.  Victoriano Pierre.

## 2018-10-01 NOTE — PROGRESS NOTES
Delmar Cao  Identified pt with two pt identifiers(name and ). Chief Complaint Patient presents with  Blood Pressure Check  Referral Follow Up  
  GYN; has an appointment with Dr. Leslie Stoner this month  Results  Anemia F/U  
 
 
1. Have you been to the ER, urgent care clinic since your last visit? Hospitalized since your last visit? No 
 
2. Have you seen or consulted any other health care providers outside of the [address] [place] [address] since your last visit? Include any pap smears or colon screening. No 
 
In the event something were to happen to you and you were unable to speak on your behalf, do you have an Advance Directive/ Living Will in place stating your wishes? NO If yes, do we have a copy on file NO If no, would you like information:     
 
 
 
Would you like to sign up for MyChart today, if you have not already done so? Patient has MyChart If not, would you like information on MyChart, and how to sign up at a later time? No 
 
 
Medication reconciliation up to date and corrected with patient at this time. Today's provider has been notified of reason for visit, vitals and flowsheets obtained on patients. Reviewed record in preparation for visit, huddled with provider and have obtained necessary documentation. Health Maintenance Due Topic  Influenza Age 5 to Adult Wt Readings from Last 3 Encounters:  
10/01/18 198 lb 3.2 oz (89.9 kg) 18 200 lb (90.7 kg) 18 200 lb (90.7 kg) Temp Readings from Last 3 Encounters:  
18 (P) 98 °F (36.7 °C) ((P) Temporal) 18 98.4 °F (36.9 °C) 18 98 °F (36.7 °C) (Oral) BP Readings from Last 3 Encounters:  
18 124/76  
18 172/84  
18 130/80 Pulse Readings from Last 3 Encounters:  
18 (P) 83  
18 88  
18 92 Vitals:  
 10/01/18 1202 10/01/18 1207 BP: (!) 172/106 156/80 Pulse: 78 Resp: 15 Temp: 98.1 °F (36.7 °C) TempSrc: Oral   
SpO2: 100% Weight: 198 lb 3.2 oz (89.9 kg) Height: 5' 3.5\" (1.613 m) PainSc:   0 - No pain LMP: 09/01/2018 Learning Assessment: 
:  
 
Learning Assessment 4/4/2018 PRIMARY LEARNER Patient PRIMARY LANGUAGE ENGLISH  
LEARNER PREFERENCE PRIMARY DEMONSTRATION  
ANSWERED BY Patient RELATIONSHIP SELF Depression Screening: 
:  
 
PHQ over the last two weeks 10/1/2018 Little interest or pleasure in doing things Not at all Feeling down, depressed, irritable, or hopeless Not at all Total Score PHQ 2 0 Fall Risk Assessment: 
:  
 
Fall Risk Assessment, last 12 mths 4/4/2018 Able to walk? Yes Fall in past 12 months? No  
 
 
Abuse Screening: 
:  
 
Abuse Screening Questionnaire 4/4/2018 Do you ever feel afraid of your partner? Tomas Binder Are you in a relationship with someone who physically or mentally threatens you? Tomas Binder Is it safe for you to go home? Y  
 
 
ADL Screening: 
:  
 

## 2018-10-02 ENCOUNTER — DOCUMENTATION ONLY (OUTPATIENT)
Dept: ONCOLOGY | Age: 43
End: 2018-10-02

## 2018-10-02 LAB
25(OH)D3+25(OH)D2 SERPL-MCNC: 8.8 NG/ML (ref 30–100)
ALBUMIN SERPL-MCNC: 4 G/DL (ref 3.5–5.5)
ALBUMIN/GLOB SERPL: 1.1 {RATIO} (ref 1.2–2.2)
ALP SERPL-CCNC: 88 IU/L (ref 39–117)
ALT SERPL-CCNC: 12 IU/L (ref 0–32)
APPEARANCE UR: CLEAR
AST SERPL-CCNC: 12 IU/L (ref 0–40)
BACTERIA #/AREA URNS HPF: ABNORMAL /[HPF]
BILIRUB SERPL-MCNC: <0.2 MG/DL (ref 0–1.2)
BILIRUB UR QL STRIP: NEGATIVE
BUN SERPL-MCNC: 12 MG/DL (ref 6–24)
BUN/CREAT SERPL: 13 (ref 9–23)
CALCIUM SERPL-MCNC: 9.2 MG/DL (ref 8.7–10.2)
CASTS URNS QL MICRO: ABNORMAL /LPF
CHLORIDE SERPL-SCNC: 103 MMOL/L (ref 96–106)
CO2 SERPL-SCNC: 24 MMOL/L (ref 20–29)
COLOR UR: YELLOW
CREAT SERPL-MCNC: 0.91 MG/DL (ref 0.57–1)
EPI CELLS #/AREA URNS HPF: ABNORMAL /HPF
ERYTHROCYTE [DISTWIDTH] IN BLOOD BY AUTOMATED COUNT: 20.9 % (ref 12.3–15.4)
GLOBULIN SER CALC-MCNC: 3.5 G/DL (ref 1.5–4.5)
GLUCOSE SERPL-MCNC: 84 MG/DL (ref 65–99)
GLUCOSE UR QL: NEGATIVE
HCT VFR BLD AUTO: 32.9 % (ref 34–46.6)
HGB BLD-MCNC: 9.8 G/DL (ref 11.1–15.9)
HGB UR QL STRIP: ABNORMAL
IRON SERPL-MCNC: 23 UG/DL (ref 27–159)
KETONES UR QL STRIP: NEGATIVE
LEUKOCYTE ESTERASE UR QL STRIP: NEGATIVE
MCH RBC QN AUTO: 22.3 PG (ref 26.6–33)
MCHC RBC AUTO-ENTMCNC: 29.8 G/DL (ref 31.5–35.7)
MCV RBC AUTO: 75 FL (ref 79–97)
MICRO URNS: ABNORMAL
MUCOUS THREADS URNS QL MICRO: PRESENT
NITRITE UR QL STRIP: NEGATIVE
PH UR STRIP: 6 [PH] (ref 5–7.5)
PLATELET # BLD AUTO: 479 X10E3/UL (ref 150–379)
POTASSIUM SERPL-SCNC: 4.4 MMOL/L (ref 3.5–5.2)
PROT SERPL-MCNC: 7.5 G/DL (ref 6–8.5)
PROT UR QL STRIP: ABNORMAL
RBC # BLD AUTO: 4.4 X10E6/UL (ref 3.77–5.28)
RBC #/AREA URNS HPF: ABNORMAL /HPF
SODIUM SERPL-SCNC: 140 MMOL/L (ref 134–144)
SP GR UR: 1.02 (ref 1–1.03)
UROBILINOGEN UR STRIP-MCNC: 1 MG/DL (ref 0.2–1)
WBC # BLD AUTO: 8.9 X10E3/UL (ref 3.4–10.8)
WBC #/AREA URNS HPF: ABNORMAL /HPF

## 2018-10-15 ENCOUNTER — DOCUMENTATION ONLY (OUTPATIENT)
Dept: ONCOLOGY | Age: 43
End: 2018-10-15

## 2018-10-15 NOTE — PROGRESS NOTES
Patient no showed (2nd time) to appointment today with Dr Sherita Rao.   Left message for pt to call the office

## 2020-02-04 ENCOUNTER — HOSPITAL ENCOUNTER (EMERGENCY)
Age: 45
Discharge: HOME | End: 2020-02-04
Attending: EMERGENCY MEDICINE
Payer: COMMERCIAL

## 2020-02-04 VITALS
SYSTOLIC BLOOD PRESSURE: 178 MMHG | OXYGEN SATURATION: 97 % | DIASTOLIC BLOOD PRESSURE: 91 MMHG | TEMPERATURE: 97.5 F | HEART RATE: 87 BPM | RESPIRATION RATE: 18 BRPM

## 2020-02-04 DIAGNOSIS — D64.9 ANEMIA, UNSPECIFIED TYPE: ICD-10-CM

## 2020-02-04 DIAGNOSIS — R55 SYNCOPE AND COLLAPSE: Primary | ICD-10-CM

## 2020-02-04 LAB
ALBUMIN SERPL-MCNC: 3.2 G/DL (ref 3.5–5)
ALBUMIN/GLOB SERPL: 0.6 {RATIO} (ref 1.1–2.2)
ALP SERPL-CCNC: 91 U/L (ref 45–117)
ALT SERPL-CCNC: 25 U/L (ref 12–78)
ANION GAP SERPL CALC-SCNC: 4 MMOL/L (ref 5–15)
APPEARANCE UR: CLEAR
AST SERPL-CCNC: 22 U/L (ref 15–37)
BACTERIA URNS QL MICRO: ABNORMAL /HPF
BASOPHILS # BLD: 0 K/UL (ref 0–0.1)
BASOPHILS NFR BLD: 0 % (ref 0–1)
BILIRUB SERPL-MCNC: 0.2 MG/DL (ref 0.2–1)
BILIRUB UR QL: NEGATIVE
BUN SERPL-MCNC: 11 MG/DL (ref 6–20)
BUN/CREAT SERPL: 8 (ref 12–20)
CALCIUM SERPL-MCNC: 9.1 MG/DL (ref 8.5–10.1)
CHLORIDE SERPL-SCNC: 105 MMOL/L (ref 97–108)
CO2 SERPL-SCNC: 28 MMOL/L (ref 21–32)
COLOR UR: ABNORMAL
COMMENT, HOLDF: NORMAL
CREAT SERPL-MCNC: 1.3 MG/DL (ref 0.55–1.02)
DIFFERENTIAL METHOD BLD: ABNORMAL
EOSINOPHIL # BLD: 0.2 K/UL (ref 0–0.4)
EOSINOPHIL NFR BLD: 2 % (ref 0–7)
EPITH CASTS URNS QL MICRO: ABNORMAL /LPF
ERYTHROCYTE [DISTWIDTH] IN BLOOD BY AUTOMATED COUNT: 18.3 % (ref 11.5–14.5)
FERRITIN SERPL-MCNC: 19 NG/ML (ref 8–252)
GLOBULIN SER CALC-MCNC: 5.1 G/DL (ref 2–4)
GLUCOSE SERPL-MCNC: 99 MG/DL (ref 65–100)
GLUCOSE UR STRIP.AUTO-MCNC: NEGATIVE MG/DL
HCG SERPL QL: NEGATIVE
HCT VFR BLD AUTO: 31.4 % (ref 35–47)
HGB BLD-MCNC: 8.8 G/DL (ref 11.5–16)
HGB UR QL STRIP: ABNORMAL
IMM GRANULOCYTES # BLD AUTO: 0 K/UL (ref 0–0.04)
IMM GRANULOCYTES NFR BLD AUTO: 0 % (ref 0–0.5)
KETONES UR QL STRIP.AUTO: NEGATIVE MG/DL
LEUKOCYTE ESTERASE UR QL STRIP.AUTO: ABNORMAL
LYMPHOCYTES # BLD: 1.3 K/UL (ref 0.8–3.5)
LYMPHOCYTES NFR BLD: 16 % (ref 12–49)
MCH RBC QN AUTO: 20.1 PG (ref 26–34)
MCHC RBC AUTO-ENTMCNC: 28 G/DL (ref 30–36.5)
MCV RBC AUTO: 71.7 FL (ref 80–99)
MONOCYTES # BLD: 0.9 K/UL (ref 0–1)
MONOCYTES NFR BLD: 11 % (ref 5–13)
NEUTS SEG # BLD: 5.6 K/UL (ref 1.8–8)
NEUTS SEG NFR BLD: 71 % (ref 32–75)
NITRITE UR QL STRIP.AUTO: NEGATIVE
NRBC # BLD: 0 K/UL (ref 0–0.01)
NRBC BLD-RTO: 0 PER 100 WBC
PH UR STRIP: 6 [PH] (ref 5–8)
PLATELET # BLD AUTO: 428 K/UL (ref 150–400)
PMV BLD AUTO: 10.4 FL (ref 8.9–12.9)
POTASSIUM SERPL-SCNC: 3.5 MMOL/L (ref 3.5–5.1)
PROT SERPL-MCNC: 8.3 G/DL (ref 6.4–8.2)
PROT UR STRIP-MCNC: 100 MG/DL
RBC # BLD AUTO: 4.38 M/UL (ref 3.8–5.2)
RBC #/AREA URNS HPF: >100 /HPF (ref 0–5)
RBC MORPH BLD: ABNORMAL
SAMPLES BEING HELD,HOLD: NORMAL
SODIUM SERPL-SCNC: 137 MMOL/L (ref 136–145)
SP GR UR REFRACTOMETRY: 1.02 (ref 1–1.03)
UR CULT HOLD, URHOLD: NORMAL
UROBILINOGEN UR QL STRIP.AUTO: 1 EU/DL (ref 0.2–1)
WBC # BLD AUTO: 8 K/UL (ref 3.6–11)
WBC URNS QL MICRO: ABNORMAL /HPF (ref 0–4)

## 2020-02-04 PROCEDURE — 80053 COMPREHEN METABOLIC PANEL: CPT

## 2020-02-04 PROCEDURE — 81001 URINALYSIS AUTO W/SCOPE: CPT

## 2020-02-04 PROCEDURE — 96374 THER/PROPH/DIAG INJ IV PUSH: CPT

## 2020-02-04 PROCEDURE — 84703 CHORIONIC GONADOTROPIN ASSAY: CPT

## 2020-02-04 PROCEDURE — 99284 EMERGENCY DEPT VISIT MOD MDM: CPT

## 2020-02-04 PROCEDURE — 93005 ELECTROCARDIOGRAM TRACING: CPT

## 2020-02-04 PROCEDURE — 74011250636 HC RX REV CODE- 250/636: Performed by: EMERGENCY MEDICINE

## 2020-02-04 PROCEDURE — 36415 COLL VENOUS BLD VENIPUNCTURE: CPT

## 2020-02-04 PROCEDURE — 96375 TX/PRO/DX INJ NEW DRUG ADDON: CPT

## 2020-02-04 PROCEDURE — 85025 COMPLETE CBC W/AUTO DIFF WBC: CPT

## 2020-02-04 PROCEDURE — 82728 ASSAY OF FERRITIN: CPT

## 2020-02-04 PROCEDURE — 74011250637 HC RX REV CODE- 250/637: Performed by: EMERGENCY MEDICINE

## 2020-02-04 RX ORDER — KETOROLAC TROMETHAMINE 30 MG/ML
15 INJECTION, SOLUTION INTRAMUSCULAR; INTRAVENOUS
Status: COMPLETED | OUTPATIENT
Start: 2020-02-04 | End: 2020-02-04

## 2020-02-04 RX ORDER — ONDANSETRON 2 MG/ML
4 INJECTION INTRAMUSCULAR; INTRAVENOUS
Status: COMPLETED | OUTPATIENT
Start: 2020-02-04 | End: 2020-02-04

## 2020-02-04 RX ORDER — ACETAMINOPHEN 325 MG/1
650 TABLET ORAL
Status: COMPLETED | OUTPATIENT
Start: 2020-02-04 | End: 2020-02-04

## 2020-02-04 RX ADMIN — ACETAMINOPHEN 650 MG: 325 TABLET ORAL at 13:57

## 2020-02-04 RX ADMIN — ONDANSETRON 4 MG: 2 INJECTION INTRAMUSCULAR; INTRAVENOUS at 13:57

## 2020-02-04 RX ADMIN — KETOROLAC TROMETHAMINE 15 MG: 30 INJECTION, SOLUTION INTRAMUSCULAR at 13:57

## 2020-02-04 RX ADMIN — SODIUM CHLORIDE 1000 ML: 900 INJECTION, SOLUTION INTRAVENOUS at 13:56

## 2020-02-04 NOTE — LETTER
NOTIFICATION RETURN TO WORK / SCHOOL 
 
2/4/2020 3:23 PM 
 
Ms. Demi Knapp [address] [facility] [id number] To Whom It May Concern: 
 
Demi Knapp is currently under the care of [address] She will return to work/school on: 2/7/2020 If there are questions or concerns please have the patient contact our office. Sincerely, Prarish Tipton NP

## 2020-02-04 NOTE — DISCHARGE INSTRUCTIONS
Please take prenatal vitamins or a multivitamin with iron daily. Lightheadedness or Faintness: Care Instructions  Your Care Instructions  Lightheadedness is a feeling that you are about to faint or \"pass out. \" You do not feel as if you or your surroundings are moving. It is different from vertigo, which is the feeling that you or things around you are spinning or tilting. Lightheadedness usually goes away or gets better when you lie down. If lightheadedness gets worse, it can lead to a fainting spell. It is common to feel lightheaded from time to time. Lightheadedness usually is not caused by a serious problem. It often is caused by a short-lasting drop in blood pressure and blood flow to your head that occurs when you get up too quickly from a seated or lying position. Follow-up care is a key part of your treatment and safety. Be sure to make and go to all appointments, and call your doctor if you are having problems. It's also a good idea to know your test results and keep a list of the medicines you take. How can you care for yourself at home? · Lie down for 1 or 2 minutes when you feel lightheaded. After lying down, sit up slowly and remain sitting for 1 to 2 minutes before slowly standing up. · Avoid movements, positions, or activities that have made you lightheaded in the past.  · Get plenty of rest, especially if you have a cold or flu, which can cause lightheadedness. · Make sure you drink plenty of fluids, especially if you have a fever or have been sweating. · Do not drive or put yourself and others in danger while you feel lightheaded. When should you call for help? Call 911 anytime you think you may need emergency care. For example, call if:    · You have symptoms of a stroke. These may include:  ? Sudden numbness, tingling, weakness, or loss of movement in your face, arm, or leg, especially on only one side of your body. ? Sudden vision changes. ? Sudden trouble speaking.   ? Sudden confusion or trouble understanding simple statements. ? Sudden problems with walking or balance. ? A sudden, severe headache that is different from past headaches.     · You have symptoms of a heart attack. These may include:  ? Chest pain or pressure, or a strange feeling in the chest.  ? Sweating. ? Shortness of breath. ? Nausea or vomiting. ? Pain, pressure, or a strange feeling in the back, neck, jaw, or upper belly or in one or both shoulders or arms. ? Lightheadedness or sudden weakness. ? A fast or irregular heartbeat. After you call 911, the  may tell you to chew 1 adult-strength or 2 to 4 low-dose aspirin. Wait for an ambulance. Do not try to drive yourself.    Watch closely for changes in your health, and be sure to contact your doctor if:    · Your lightheadedness gets worse or does not get better with home care. Where can you learn more? Go to http://tl-aguilar.info/. Enter G636 in the search box to learn more about \"Lightheadedness or Faintness: Care Instructions. \"  Current as of: June 26, 2019  Content Version: 12.2  © 5807-6632 [employer]. Care instructions adapted under license by [employer] (which disclaims liability or warranty for this information). If you have questions about a medical condition or this instruction, always ask your healthcare professional. [place] [address] any warranty or liability for your use of this information. Patient Education        Anemia: Care Instructions  Your Care Instructions    Anemia is a low level of red blood cells, which carry oxygen throughout your body. Many things can cause anemia. Lack of iron is one of the most common causes. Your body needs iron to make hemoglobin, a substance in red blood cells that carries oxygen from the lungs to your body's cells. Without enough iron, the body produces fewer and smaller red blood cells.  As a result, your body's cells do not get enough oxygen, and you feel tired and weak. And you may have trouble concentrating. Bleeding is the most common cause of a lack of iron. You may have heavy menstrual bleeding or bleeding caused by conditions such as ulcers, hemorrhoids, or cancer. Regular use of aspirin or other anti-inflammatory medicines (such as ibuprofen) also can cause bleeding in some people. A lack of iron in your diet also can cause anemia, especially at times when the body needs more iron, such as during pregnancy, infancy, and the teen years. Your doctor may have prescribed iron pills. It may take several months of treatment for your iron levels to return to normal. Your doctor also may suggest that you eat foods that are rich in iron, such as meat and beans. There are many other causes of anemia. It is not always due to a lack of iron. Finding the specific cause of your anemia will help your doctor find the right treatment for you. Follow-up care is a key part of your treatment and safety. Be sure to make and go to all appointments, and call your doctor if you are having problems. It's also a good idea to know your test results and keep a list of the medicines you take. How can you care for yourself at home? · Take your medicines exactly as prescribed. Call your doctor if you think you are having a problem with your medicine. · If your doctor recommends iron pills, take them as directed:  ? Try to take the pills on an empty stomach about 1 hour before or 2 hours after meals. But you may need to take iron with food to avoid an upset stomach. ? Do not take antacids or drink milk or caffeine drinks (such as coffee, tea, or cola) at the same time or within 2 hours of the time that you take your iron. They can make it hard for your body to absorb the iron. ? Vitamin C (from food or supplements) helps your body absorb iron.  Try taking iron pills with a glass of orange juice or some other food that is high in vitamin C, such as citrus fruits. ? Iron pills may cause stomach problems, such as heartburn, nausea, diarrhea, constipation, and cramps. Be sure to drink plenty of fluids, and include fruits, vegetables, and fiber in your diet each day. Iron pills often make your bowel movements dark or green. ? If you forget to take an iron pill, do not take a double dose of iron the next time you take a pill. ? Keep iron pills out of the reach of small children. An overdose of iron can be very dangerous. · Follow your doctor's advice about eating iron-rich foods. These include red meat, shellfish, poultry, eggs, beans, raisins, whole-grain bread, and leafy green vegetables. · Steam vegetables to help them keep their iron content. When should you call for help? Call 911 anytime you think you may need emergency care. For example, call if:    · You have symptoms of a heart attack. These may include:  ? Chest pain or pressure, or a strange feeling in the chest.  ? Sweating. ? Shortness of breath. ? Nausea or vomiting. ? Pain, pressure, or a strange feeling in the back, neck, jaw, or upper belly or in one or both shoulders or arms. ? Lightheadedness or sudden weakness. ? A fast or irregular heartbeat. After you call 911, the  may tell you to chew 1 adult-strength or 2 to 4 low-dose aspirin. Wait for an ambulance. Do not try to drive yourself.     · You passed out (lost consciousness).    Call your doctor now or seek immediate medical care if:    · You have new or increased shortness of breath.     · You are dizzy or lightheaded, or you feel like you may faint.     · Your fatigue and weakness continue or get worse.     · You have any abnormal bleeding, such as:  ? Nosebleeds. ? Vaginal bleeding that is different (heavier, more frequent, at a different time of the month) than what you are used to.  ? Bloody or black stools, or rectal bleeding. ?  Bloody or pink urine.    Watch closely for changes in your health, and be sure to contact your doctor if:    · You do not get better as expected. Where can you learn more? Go to http://tl-aguilar.info/. Enter R301 in the search box to learn more about \"Anemia: Care Instructions. \"  Current as of: March 28, 2019  Content Version: 12.2  © 3339-2245 [employer]. Care instructions adapted under license by [employer] (which disclaims liability or warranty for this information). If you have questions about a medical condition or this instruction, always ask your healthcare professional. [address] any warranty or liability for your use of this information. Patient Education        Vasovagal Syncope: Care Instructions  Your Care Instructions    Vasovagal syncope (say \"mog-ueg-WOV-gul KVSF-sei-sec\")is sudden dizziness or fainting that can be set off by things such as pain, stress, fear, or trauma. You may sweat or feel lightheaded, sick to your stomach, or tingly. The problem causes the heart rate to slow and the blood vessels to widen, or dilate, for a short time. When this happens, blood pools in the lower body, and less blood goes to the brain. You can usually get relief by lying down with your legs raised (elevated). This helps more blood to flow to your brain and may help relieve symptoms like feeling dizzy. Some doctors may recommend a technique that involves tensing your fists and arms. This type of fainting is often easy to predict. For example, it happens to some people when they see blood or have to get a shot. They may feel symptoms before they faint. An episode of vasovagal syncope usually responds well to self-care. Other treatment often isn't needed. But if the fainting keeps happening, your doctor may suggest further treatments. Follow-up care is a key part of your treatment and safety. Be sure to make and go to all appointments, and call your doctor if you are having problems.  It's also a good idea to know your test results and keep a list of the medicines you take. How can you care for yourself at home? · Drink plenty of fluids to prevent dehydration. If you have kidney, heart, or liver disease and have to limit fluids, talk with your doctor before you increase your fluid intake. · Try to avoid things that you think may set off vasovagal syncope. · Talk to your doctor about any medicines you take. Some medicines may increase the chance of this condition occurring. · If you feel symptoms, lie down with your legs raised. Talk to your doctor about what to do if your symptoms come back. When should you call for help? Call 911 anytime you think you may need emergency care. For example, call if:    · You have symptoms of a heart problem. These may include:  ? Chest pain or pressure. ? Severe trouble breathing. ? A fast or irregular heartbeat.    Watch closely for changes in your health, and be sure to contact your doctor if:    · You have more episodes of fainting at home.     · You do not get better as expected. Where can you learn more? Go to http://tl-aguilar.info/. Enter L754 in the search box to learn more about \"Vasovagal Syncope: Care Instructions. \"  Current as of: June 26, 2019  Content Version: 12.2  © 1825-3012 [employer]. Care instructions adapted under license by [employer] (which disclaims liability or warranty for this information). If you have questions about a medical condition or this instruction, always ask your healthcare professional. [place] [address] any warranty or liability for your use of this information.

## 2020-02-04 NOTE — ED TRIAGE NOTES
1244: Patient arrives for flu-like symptoms that she has been experiencing since Sunday. Patient states, she had an unwitnessed syncopal episode while at work, Bear zavala was calling my name and I woke up. \"    + cough, headache, sneezing    Self - medicated with Nyquil

## 2020-02-04 NOTE — ED PROVIDER NOTES
HPI patient is a 57-year-old black female who presents the ED after a syncopal episode while at work today. She remembers being at the counter working at [employer] in the next thing she remembers is seeing her boss standing over her. Her coworkers and her boss report that the syncopal episode lasted just seconds. She started her menstrual cycle today and has a history of being anemic. She reports nasal congestion runny nose and mild headache. Hand eye coordination are intact, normal reflexes, normal gait. She has had 3 episodes of nonbloody vomiting since Sunday. She has not had any pain medications prior to arrival.  She has had a history of previous syncopal episodes. Past Medical History:   Diagnosis Date    Allergy, unspecified not elsewhere classified     Anemia     Ankle sprain 03/2009    bilateral.  Dr. Joaquin Berman Asthma childhood    Breast mass 5/5/09    Left. benign.  Chickenpox childhood    Ectopic pregnancy 04/2010    Right. Dr. Bob Rodriguez hypertension, benign     Menorrhagia 08/2015    Dr. Alhaji Sloan Ovarian cyst 2011    R. Dr. Kenyon Blackburn Pure hypercholesterolemia 05/2009    Thrombocytosis ([address] 05/2009    Tubal pregnancy 06/2011    Dr Purnima Charles     Vitamin D deficiency 05/2009       Past Surgical History:   Procedure Laterality Date    HX DILATION AND CURETTAGE  8/11/15    Dr. Joseph Buenrostro. due to heavy vag bleeding and thick endometrial stripe.  HX SALPINGO-OOPHORECTOMY Right 04/2010    Right. due to ectopic pregnancy. Dr. Purnima Charles.     HX TUBAL LIGATION  2002    Dr. Joseph Buenrostro         Family History:   Problem Relation Age of Onset    Diabetes Mother         type ll    Hypertension Mother     Diabetes Father         type ll       Social History     Socioeconomic History    Marital status:      Spouse name: Not on file    Number of children: Not on file    Years of education: Not on file    Highest education level: Not on file   Occupational History    Not on file   Social Needs    Financial resource strain: Not on file    Food insecurity:     Worry: Not on file     Inability: Not on file    Transportation needs:     Medical: Not on file     Non-medical: Not on file   Tobacco Use    Smoking status: Never Smoker    Smokeless tobacco: Never Used   Substance and Sexual Activity    Alcohol use: No    Drug use: No    Sexual activity: Yes     Partners: Male     Birth control/protection: Surgical     Comment: TL   Lifestyle    Physical activity:     Days per week: Not on file     Minutes per session: Not on file    Stress: Not on file   Relationships    Social connections:     Talks on phone: Not on file     Gets together: Not on file     Attends [employer]alian service: Not on file     Active member of club or organization: Not on file     Attends meetings of clubs or organizations: Not on file     Relationship status: Not on file    Intimate partner violence:     Fear of current or ex partner: Not on file     Emotionally abused: Not on file     Physically abused: Not on file     Forced sexual activity: Not on file   Other Topics Concern    Not on file   Social History Narrative    Not on file         ALLERGIES: Bactrim [sulfamethoxazole-trimethoprim]    Review of Systems   Constitutional: Negative for activity change, appetite change, fever and unexpected weight change. HENT: Positive for rhinorrhea. Negative for congestion, sore throat and trouble swallowing. Eyes: Negative for visual disturbance. Respiratory: Negative for cough and shortness of breath. Cardiovascular: Negative for chest pain, palpitations and leg swelling. Gastrointestinal: Negative for abdominal pain, constipation, diarrhea, nausea and vomiting. Genitourinary: Negative for dysuria. Musculoskeletal: Negative for arthralgias and myalgias. Skin: Negative for rash. Neurological: Positive for syncope and light-headedness. Negative for dizziness and headaches. All other systems reviewed and are negative. Vitals:    02/04/20 1252   BP: (!) 209/98   Pulse: (!) 107   Resp: 19   Temp: 99.3 °F (37.4 °C)   SpO2: 98%            Physical Exam  Vitals signs and nursing note reviewed. Constitutional:       General: She is not in acute distress. Appearance: Normal appearance. She is obese. She is not ill-appearing, toxic-appearing or diaphoretic. Comments: Female, non-smoker, works at [employer]:      Right Ear: Tympanic membrane and ear canal normal.      Left Ear: Tympanic membrane and ear canal normal.      Nose: Nose normal.      Mouth/Throat:      Mouth: Mucous membranes are moist.      Pharynx: No posterior oropharyngeal erythema. Neck:      Musculoskeletal: Normal range of motion and neck supple. Cardiovascular:      Rate and Rhythm: Normal rate and regular rhythm. Pulmonary:      Effort: Pulmonary effort is normal.      Breath sounds: Normal breath sounds. Abdominal:      General: Bowel sounds are normal.      Palpations: Abdomen is soft. Tenderness: There is no abdominal tenderness. There is no guarding or rebound. Musculoskeletal: Normal range of motion. General: No swelling, tenderness or deformity. Skin:     General: Skin is warm and dry. Findings: No rash. Neurological:      General: No focal deficit present. Mental Status: She is alert and oriented to person, place, and time. Psychiatric:         Mood and Affect: Mood normal.         Behavior: Behavior normal.          MDM       Procedures      EKG reveals normal sinus rhythm with nonspecific T wave abnormality; ventricular rate of 98; without ectopy. Reviewed by Dr. Terri Carrizales. Lesli Mccrary NP      Patient has been reexamined and denies any complaints of pain or discomfort. She reports feeling better after IV fluids and medications. Encouraged her to take a prenatal vitamin for women's multivitamin with iron daily.   She is presently on her menstrual cycle and states that she has heavy cycles which make her more symptomatic with her anemia. Encourage close follow-up with her OB/GYN and/or PCP if symptoms persist.  4:20 PM  Patient's results and plan of care have been reviewed with her and her . Patient and/or family have verbally conveyed their understanding and agreement of the patient's signs, symptoms, diagnosis, treatment and prognosis and additionally agree to follow up as recommended or return to the Emergency Room should her condition change prior to follow-up. Discharge instructions have also been provided to the patient with some educational information regarding her diagnosis as well a list of reasons why she would want to return to the ER prior to her follow-up appointment should her condition change. Evans Viveros NP

## 2020-02-04 NOTE — ED NOTES
Patient iv removed no signs of redness or swelling at side. No bleeding. Patient ambulated with steady gait accompanied by family member from department. Patient without signs of distress.

## 2020-02-04 NOTE — LETTER
NOTIFICATION RETURN TO WORK / SCHOOL 
 
2/4/2020 3:01 PM 
 
Ms. Dorys Andersen [address] [facility] [id number] To Whom It May Concern: 
 
Dorys Andersen is currently under the care of [address]. She will return to work/school on: 1/7/2020 If there are questions or concerns please have the patient contact our office. Sincerely, David Harden NP

## 2020-02-05 LAB
ATRIAL RATE: 98 BPM
CALCULATED P AXIS, ECG09: 68 DEGREES
CALCULATED R AXIS, ECG10: 53 DEGREES
CALCULATED T AXIS, ECG11: 17 DEGREES
DIAGNOSIS, 93000: NORMAL
P-R INTERVAL, ECG05: 144 MS
Q-T INTERVAL, ECG07: 358 MS
QRS DURATION, ECG06: 68 MS
QTC CALCULATION (BEZET), ECG08: 457 MS
VENTRICULAR RATE, ECG03: 98 BPM

## 2020-04-24 ENCOUNTER — DOCUMENTATION ONLY (OUTPATIENT)
Dept: FAMILY MEDICINE CLINIC | Age: 45
End: 2020-04-24

## 2020-04-24 NOTE — PROGRESS NOTES
VM left for patient to get her (Lisinopril 40 mg)  30 day refill, from [facility], patient needs to schedule an appointment for further refills.

## 2020-05-21 NOTE — MR AVS SNAPSHOT
[address] [place] [address] 
[address] [place] [address] 
[phone] Patient: Ridge De Paz MRN:  [id number] Visit Information Date & Time Provider Department Dept. Phone Encounter #  
 2018 11:30 AM Jessica Christopher DO [facility] [id number] Follow-up Instructions Return in about 3 months (around 2018) for bp, results, referral follow up. Upcoming Health Maintenance Date Due Pneumococcal 19-64 Highest Risk (2 of 3 - PCV13) 10/31/2018* PAP AKA CERVICAL CYTOLOGY 3/22/2020 DTaP/Tdap/Td series (2 - Td) 3/22/2027 *Topic was postponed. The date shown is not the original due date. Allergies as of 2018  Review Complete On: 2018 By: Jessica Christopher DO Severity Noted Reaction Type Reactions Bactrim [Sulfamethoxazole-trimethoprim]  2010    Other (comments) GI Upset Current Immunizations  Reviewed on 2018 No immunizations on file. Reviewed by Jessica Christopher DO on 2018 at 12:55 PM  
You Were Diagnosed With   
  
 Codes Comments Well woman exam without gynecological exam    -  Primary ICD-10-CM: Z00.00 ICD-9-CM: V70.0 Essential hypertension, benign     ICD-10-CM: I10 
ICD-9-CM: 401.1 Acute nasopharyngitis     ICD-10-CM: Stephany Bigger ICD-9-CM: 802 Dysuria     ICD-10-CM: R30.0 ICD-9-CM: 788.1 Mild intermittent asthma with acute exacerbation     ICD-10-CM: J45.21 ICD-9-CM: 493.92 due to allergies, URI Thrombocytosis ([facility] [address]     ICD-10-CM: D47.3 ICD-9-CM: 238.71 Other elevated white blood cell (WBC) count     ICD-10-CM: Y85.437 ICD-9-CM: 288.69 Seasonal allergic rhinitis due to other allergic trigger     ICD-10-CM: J30.89 ICD-9-CM: 477.8 Motion sickness, initial encounter     ICD-10-CM: T75. 3XXA ICD-9-CM: 994.6 Vitamin D deficiency     ICD-10-CM: E55.9 ICD-9-CM: 268.9 Other iron deficiency anemia     ICD-10-CM: D50.8 ICD-9-CM: 280.8 Pure hypercholesterolemia     ICD-10-CM: E78.00 ICD-9-CM: 272.0   
 RAD (reactive airway disease) with wheezing, mild intermittent, uncomplicated     XMH-05-YK: J45.20 ICD-9-CM: 493.90 Weight loss     ICD-10-CM: R63.4 ICD-9-CM: 783.21 15# since 2017 due to increased exercise DUB (dysfunctional uterine bleeding)     ICD-10-CM: N93.8 ICD-9-CM: 626.8 with large clots, unchanged since D and C Need for Tdap vaccination     ICD-10-CM: P94 ICD-9-CM: V06.1 Vitals BP Pulse Temp Resp Height(growth percentile) Weight(growth percentile) (!) 179/93 (BP 1 Location: Left arm, BP Patient Position: Sitting) 92 98 °F (36.7 °C) (Oral) 12 5' 2\" (1.575 m) 200 lb 8 oz (90.9 kg) LMP SpO2 BMI OB Status Smoking Status 03/21/2018 (Exact Date) 100% 36.67 kg/m2 Having regular periods Never Smoker Vitals History BMI and BSA Data Body Mass Index Body Surface Area  
 36.67 kg/m 2 1.99 m 2 Preferred Pharmacy Pharmacy Name Phone [facility], [address] [place] [address] AT [address] [phone] Your Updated Medication List  
  
   
This list is accurate as of 4/4/18  1:12 PM.  Always use your most recent med list. amLODIPine 10 mg tablet Commonly known as:  Ferny Railing Take 1 Tab by mouth daily. Indications: hypertension  
  
 lisinopril 40 mg tablet Commonly known as:  Velora Bryant Take 1.5 Tabs by mouth daily. Indications: hypertension  
  
 mometasone 220 mcg (30 doses) inhaler Commonly known as:  Sayra Locket Take 1 Puff by inhalation two (2) times a day. Indications: MAINTENANCE THERAPY FOR ASTHMA  
  
 montelukast 10 mg tablet Commonly known as:  SINGULAIR Take 1 Tab by mouth daily. Indications: Allergic Rhinitis MUCINEX 600 mg ER tablet Generic drug:  guaiFENesin ER  
 Take 600 mg by mouth two (2) times a day. PROAIR HFA 90 mcg/actuation inhaler Generic drug:  albuterol Take 2 Puffs by inhalation every four (4) hours as needed for Wheezing. Indications: BRONCHOSPASM PREVENTION  
  
 scopolamine 1 mg over 3 days Pt3d Commonly known as:  TRANSDERM-SCOP  
1 Patch by TransDERmal route every seventy-two (72) hours. Indications: PREVENTION OF MOTION SICKNESS ZyrTEC 10 mg tablet Generic drug:  cetirizine Take  by mouth. Prescriptions Printed Refills  
 montelukast (SINGULAIR) 10 mg tablet 3 Sig: Take 1 Tab by mouth daily. Indications: Allergic Rhinitis Class: Print Route: Oral  
  
Prescriptions Sent to Pharmacy Refills PROAIR HFA 90 mcg/actuation inhaler 1 Sig: Take 2 Puffs by inhalation every four (4) hours as needed for Wheezing. Indications: BRONCHOSPASM PREVENTION Class: Normal  
 Pharmacy: [facility]  RD AT [address] [place] [address] P Ph #: [phone] Route: Inhalation  
 lisinopril (PRINIVIL, ZESTRIL) 40 mg tablet 11 Sig: Take 1.5 Tabs by mouth daily. Indications: hypertension Class: Normal  
 Pharmacy: [facility]  RD AT [address] [place] [address] P Ph #: [phone] Route: Oral  
 scopolamine (TRANSDERM-SCOP) 1 mg over 3 days pt3d 0 Si Patch by TransDERmal route every seventy-two (72) hours. Indications: PREVENTION OF MOTION SICKNESS Class: Normal  
 Pharmacy: [facility]  RD AT [address] [place] [address] P Ph #: [phone] Route: TransDERmal  
 mometasone (ASMANEX TWISTHALER) 220 mcg (30 doses) inhaler 5 Sig: Take 1 Puff by inhalation two (2) times a day. Indications: MAINTENANCE THERAPY FOR ASTHMA  Class: Normal  
 Pharmacy: [facility] [address] [facility] [place] BLOOM  #: [phone] Route: Inhalation We Performed the Following CBC W/O DIFF [91582 CPT(R)] CULTURE, URINE [id number] CPT(R)] LIPID PANEL [46295 CPT(R)] METABOLIC PANEL, COMPREHENSIVE [24108 CPT(R)] MICROALBUMIN, UR, RAND W/ MICROALB/CREAT RATIO [id number] CPT(R)] REFERRAL TO OBSTETRICS AND GYNECOLOGY [REF51 Custom] Comments:  
 DUB, menorrhagia with blood clots, annual GYN  
 TSH 3RD GENERATION [81228 CPT(R)] URINALYSIS W/ RFLX MICROSCOPIC [96287 CPT(R)] VITAMIN D, 25 HYDROXY [id number] CPT(R)] Follow-up Instructions Return in about 3 months (around 7/4/2018) for bp, results, referral follow up. Referral Information Referral ID Referred By Referred To  
  
 [id number] [facility] 400 [facility], [address] Visits Status Start Date End Date 1 New Request 4/4/18 4/4/19 If your referral has a status of pending review or denied, additional information will be sent to support the outcome of this decision. Patient Instructions Well Visit, Ages 25 to 48: Care Instructions Your Care Instructions Physical exams can help you stay healthy. Your doctor has checked your overall health and may have suggested ways to take good care of yourself. He or she also may have recommended tests. At home, you can help prevent illness with healthy eating, regular exercise, and other steps. Follow-up care is a key part of your treatment and safety. Be sure to make and go to all appointments, and call your doctor if you are having problems. It's also a good idea to know your test results and keep a list of the medicines you take. How can you care for yourself at home? · Reach and stay at a healthy weight. This will lower your risk for many problems, such as obesity, diabetes, heart disease, and high blood pressure. · Get at least 30 minutes of physical activity on most days of the week. Walking is a good choice. You also may want to do other activities, such as running, swimming, cycling, or playing tennis or team sports. Discuss any changes in your exercise program with your doctor. · Do not smoke or allow others to smoke around you. If you need help quitting, talk to your doctor about stop-smoking programs and medicines. These can increase your chances of quitting for good. · Talk to your doctor about whether you have any risk factors for sexually transmitted infections (STIs). Having one sex partner (who does not have STIs and does not have sex with anyone else) is a good way to avoid these infections. · Use birth control if you do not want to have children at this time. Talk with your doctor about the choices available and what might be best for you. · Protect your skin from too much sun. When you're outdoors from 10 a.m. to 4 p.m., stay in the shade or cover up with clothing and a hat with a wide brim. Wear sunglasses that block UV rays. Even when it's cloudy, put broad-spectrum sunscreen (SPF 30 or higher) on any exposed skin. · See a dentist one or two times a year for checkups and to have your teeth cleaned. · Wear a seat belt in the car. · Drink alcohol in moderation, if at all. That means no more than 2 drinks a day for men and 1 drink a day for women. Follow your doctor's advice about when to have certain tests. These tests can spot problems early. For everyone · Cholesterol. Have the fat (cholesterol) in your blood tested after age 21. Your doctor will tell you how often to have this done based on your age, family history, or other things that can increase your risk for heart disease. · Blood pressure. Have your blood pressure checked during a routine doctor visit. Your doctor will tell you how often to check your blood pressure based on your age, your blood pressure results, and other factors. · Vision. Talk with your doctor about how often to have a glaucoma test. 
· Diabetes. Ask your doctor whether you should have tests for diabetes. · Colon cancer. Have a test for colon cancer at age 48. You may have one of several tests. If you are younger than 48, you may need a test earlier if you have any risk factors. Risk factors include whether you already had a precancerous polyp removed from your colon or whether your parent, brother, sister, or child has had colon cancer. For women · Breast exam and mammogram. Talk to your doctor about when you should have a clinical breast exam and a mammogram. Medical experts differ on whether and how often women under 50 should have these tests. Your doctor can help you decide what is right for you. · Pap test and pelvic exam. Begin Pap tests at age 24. A Pap test is the best way to find cervical cancer. The test often is part of a pelvic exam. Ask how often to have this test. 
· Tests for sexually transmitted infections (STIs). Ask whether you should have tests for STIs. You may be at risk if you have sex with more than one person, especially if your partners do not wear condoms. For men · Tests for sexually transmitted infections (STIs). Ask whether you should have tests for STIs. You may be at risk if you have sex with more than one person, especially if you do not wear a condom. · Testicular cancer exam. Ask your doctor whether you should check your testicles regularly. · Prostate exam. Talk to your doctor about whether you should have a blood test (called a PSA test) for prostate cancer. Experts differ on whether and when men should have this test. Some experts suggest it if you are older than 39 and are -American or have a father or brother who got prostate cancer when he was younger than 72. When should you call for help?  
Watch closely for changes in your health, and be sure to contact your doctor if you have any problems or symptoms that concern you. Where can you learn more? Go to http://tl-aguilar.info/. Enter P072 in the search box to learn more about \"Well Visit, Ages 25 to 48: Care Instructions. \" Current as of: May 12, 2017 Content Version: 11.4 © 2680-2705 [employer]. Care instructions adapted under license by [employer] (which disclaims liability or warranty for this information). If you have questions about a medical condition or this instruction, always ask your healthcare professional. [place] [address] any warranty or liability for your use of this information. Advance Care Planning: Care Instructions Your Care Instructions It can be hard to live with an illness that cannot be cured. But if your health is getting worse, you may want to make decisions about end-of-life care. Planning for the end of your life does not mean that you are giving up. It is a way to make sure that your wishes are met. Clearly stating your wishes can make it easier for your loved ones. Making plans while you are still able may also ease your mind and make your final days less stressful and more meaningful. Follow-up care is a key part of your treatment and safety. Be sure to make and go to all appointments, and call your doctor if you are having problems. It's also a good idea to know your test results and keep a list of the medicines you take. What can you do to plan for the end of life? · You can bring these issues up with your doctor. You do not need to wait until your doctor starts the conversation. You might start with \"I would not be willing to live with . Benna Him Benna Him Benna Him \" When you complete this sentence it helps your doctor understand your wishes. · Talk openly and honestly with your doctor. This is the best way to understand the decisions you will need to make as your health changes. Know that you can always change your mind. · Ask your doctor about commonly used life-support measures. These include tube feedings, breathing machines, and fluids given through a vein (IV). Understanding these treatments will help you decide whether you want them. · You may choose to have these life-supporting treatments for a limited time. This allows a trial period to see whether they will help you. You may also decide that you want your doctor to take only certain measures to keep you alive. It is important to spell out these conditions so that your doctor and family understand them. · Talk to your doctor about how long you are likely to live. He or she may be able to give you an idea of what usually happens with your specific illness. · Think about preparing papers that state your wishes. This way there will not be any confusion about what you want. You can change your instructions at any time. Which papers should you prepare? Advance directives are legal papers that tell doctors how you want to be cared for at the end of your life. You do not need a  to write these papers. Ask your doctor or your state [facility] department for information on how to write your advance directives. They may have the forms for each of these types of papers. Make sure your doctor has a copy of these on file, and give a copy to a family member or close friend. · Consider a do-not-resuscitate order (DNR). This order asks that no extra treatments be done if your heart stops or you stop breathing. Extra treatments may include cardiopulmonary resuscitation (CPR), electrical shock to restart your heart, or a machine to breathe for you. If you decide to have a DNR order, ask your doctor to explain and write it. Place the order in your home where everyone can easily see it. · Consider a living will.  A living will explains your wishes about life support and other treatments at the end of your life if you become unable to speak for yourself. Living lester tell doctors to use or not use treatments that would keep you alive. You must have one or two witnesses or a notary present when you sign this form. · Consider a durable power of  for health care. This allows you to name a person to make decisions about your care if you are not able to. Most people ask a close friend or family member. Talk to this person about the kinds of treatments you want and those that you do not want. Make sure this person understands your wishes. These legal papers are simple to change. Tell your doctor what you want to change, and ask him or her to make a note in your medical file. Give your family updated copies of the papers. Where can you learn more? Go to http://tl-aguilar.info/. Enter P184 in the search box to learn more about \"Advance Care Planning: Care Instructions. \" Current as of: September 24, 2016 Content Version: 11.4 © 2869-3452 [employer]Rx. Care instructions adapted under license by We[employer]AP (which disclaims liability or warranty for this information). If you have questions about a medical condition or this instruction, always ask your healthcare professional. [place] [address] any warranty or liability for your use of this information. DASH Diet: Care Instructions Your Care Instructions The DASH diet is an eating plan that can help lower your blood pressure. DASH stands for Dietary Approaches to Stop Hypertension. Hypertension is high blood pressure. The DASH diet focuses on eating foods that are high in calcium, potassium, and magnesium. These nutrients can lower blood pressure. The foods that are highest in these nutrients are fruits, vegetables, low-fat dairy products, nuts, seeds, and legumes.  But taking calcium, potassium, and magnesium supplements instead of eating foods that are high in those nutrients does not have the same effect. The DASH diet also includes whole grains, fish, and poultry. The DASH diet is one of several lifestyle changes your doctor may recommend to lower your high blood pressure. Your doctor may also want you to decrease the amount of sodium in your diet. Lowering sodium while following the DASH diet can lower blood pressure even further than just the DASH diet alone. Follow-up care is a key part of your treatment and safety. Be sure to make and go to all appointments, and call your doctor if you are having problems. It's also a good idea to know your test results and keep a list of the medicines you take. How can you care for yourself at home? Following the DASH diet · Eat 4 to 5 servings of fruit each day. A serving is 1 medium-sized piece of fruit, ½ cup chopped or canned fruit, 1/4 cup dried fruit, or 4 ounces (½ cup) of fruit juice. Choose fruit more often than fruit juice. · Eat 4 to 5 servings of vegetables each day. A serving is 1 cup of lettuce or raw leafy vegetables, ½ cup of chopped or cooked vegetables, or 4 ounces (½ cup) of vegetable juice. Choose vegetables more often than vegetable juice. · Get 2 to 3 servings of low-fat and fat-free dairy each day. A serving is 8 ounces of milk, 1 cup of yogurt, or 1 ½ ounces of cheese. · Eat 6 to 8 servings of grains each day. A serving is 1 slice of bread, 1 ounce of dry cereal, or ½ cup of cooked rice, pasta, or cooked cereal. Try to choose whole-grain products as much as possible. · Limit lean meat, poultry, and fish to 2 servings each day. A serving is 3 ounces, about the size of a deck of cards. · Eat 4 to 5 servings of nuts, seeds, and legumes (cooked dried beans, lentils, and split peas) each week. A serving is 1/3 cup of nuts, 2 tablespoons of seeds, or ½ cup of cooked beans or peas. · Limit fats and oils to 2 to 3 servings each day. A serving is 1 teaspoon of vegetable oil or 2 tablespoons of salad dressing. · Limit sweets and added sugars to 5 servings or less a week. A serving is 1 tablespoon jelly or jam, ½ cup sorbet, or 1 cup of lemonade. · Eat less than 2,300 milligrams (mg) of sodium a day. If you limit your sodium to 1,500 mg a day, you can lower your blood pressure even more. Tips for success · Start small. Do not try to make dramatic changes to your diet all at once. You might feel that you are missing out on your favorite foods and then be more likely to not follow the plan. Make small changes, and stick with them. Once those changes become habit, add a few more changes. · Try some of the following: ¨ Make it a goal to eat a fruit or vegetable at every meal and at snacks. This will make it easy to get the recommended amount of fruits and vegetables each day. ¨ Try yogurt topped with fruit and nuts for a snack or healthy dessert. ¨ Add lettuce, tomato, cucumber, and onion to sandwiches. ¨ Combine a ready-made pizza crust with low-fat mozzarella cheese and lots of vegetable toppings. Try using tomatoes, squash, spinach, broccoli, carrots, cauliflower, and onions. ¨ Have a variety of cut-up vegetables with a low-fat dip as an appetizer instead of chips and dip. ¨ Sprinkle sunflower seeds or chopped almonds over salads. Or try adding chopped walnuts or almonds to cooked vegetables. ¨ Try some vegetarian meals using beans and peas. Add garbanzo or kidney beans to salads. Make burritos and tacos with mashed glynn beans or black beans. Where can you learn more? Go to http://tl-aguilar.info/. Enter N962 in the search box to learn more about \"DASH Diet: Care Instructions. \" Current as of: September 21, 2016 Content Version: 11.4 © 2715-0701 [employer]. Care instructions adapted under license by eInstruction by Turning [employer] (which disclaims liability or warranty for this information).  If you have questions about a medical condition or this instruction, always ask your healthcare professional. [address] any warranty or liability for your use of this information. Starting a Weight Loss Plan: Care Instructions Your Care Instructions If you are thinking about losing weight, it can be hard to know where to start. Your doctor can help you set up a weight loss plan that best meets your needs. You may want to take a class on nutrition or exercise, or join a weight loss support group. If you have questions about how to make changes to your eating or exercise habits, ask your doctor about seeing a registered dietitian or an exercise specialist. 
It can be a big challenge to lose weight. But you do not have to make huge changes at once. Make small changes, and stick with them. When those changes become habit, add a few more changes. If you do not think you are ready to make changes right now, try to pick a date in the future. Make an appointment to see your doctor to discuss whether the time is right for you to start a plan. Follow-up care is a key part of your treatment and safety. Be sure to make and go to all appointments, and call your doctor if you are having problems. It's also a good idea to know your test results and keep a list of the medicines you take. How can you care for yourself at home? · Set realistic goals. Many people expect to lose much more weight than is likely. A weight loss of 5% to 10% of your body weight may be enough to improve your health. · Get family and friends involved to provide support. Talk to them about why you are trying to lose weight, and ask them to help. They can help by participating in exercise and having meals with you, even if they may be eating something different. · Find what works best for you.  If you do not have time or do not like to cook, a program that offers meal replacement bars or shakes may be better for you. Or if you like to prepare meals, finding a plan that includes daily menus and recipes may be best. 
· Ask your doctor about other health professionals who can help you achieve your weight loss goals. ¨ A dietitian can help you make healthy changes in your diet. ¨ An exercise specialist or  can help you develop a safe and effective exercise program. 
¨ A counselor or psychiatrist can help you cope with issues such as depression, anxiety, or family problems that can make it hard to focus on weight loss. · Consider joining a support group for people who are trying to lose weight. Your doctor can suggest groups in your area. Where can you learn more? Go to http://tlMoxie Jeanaguilar.info/. Enter N409 in the search box to learn more about \"Starting a Weight Loss Plan: Care Instructions. \" Current as of: October 13, 2016 Content Version: 11.4 © 3106-6468 [employer]. Care instructions adapted under license by Rocket Software (which disclaims liability or warranty for this information). If you have questions about a medical condition or this instruction, always ask your healthcare professional. [place] [address] any warranty or liability for your use of this information. WEIGHT LOSS PLAN 1. Read food labels and count calories. Goal 2422-1749 calories daily for women and 5240-3606 calories daily for men. Achieve this by decreasing caloric intake by 500 calories by weekly increments. NOTE:  1 pound = 3500 calories! Www.loseit. com Www.myfitnesspal.com Www.Yoopies. [employer]Scan Www.healthfinder. gov Weight watchers mobile Calories needed to lose 1-2 pounds a week = 10 x your weight in pounds 2. Increase water intake. Per [employer] [facility] loss Program, it is important to drink 1/2 your body weight in ounces of water daily.   Decrease your water consumption 2-3 hours before bedtime to prevent sleep disturbance from frequent urination. 3.  Decrease sugary beverages. Each can or glass of soda increases your risk of obesity by 60%. Can lose 10 pounds in a month by avoiding any soda. 12 oz can of soda = 140 calories, 16 oz cup of sweet tea = 200 calories 16 oz orange juice = 200 calories, 10 oz apple juice = 150 calories 32 oz sports drink = 200 calories, 16 oz punch = 240 calories 3.5 oz alcohol = 100-150 calories 4. Avoid High Fructose Corn Syrup products. This ingredient makes products highly addictive! 5. Exercise 30 minutes daily 5 days weekly, minimally. If you burn 3500 calories that equals a pound! Use a pedometer to count steps. Visit www. [employer] for a free pedometer and diet recommendations. Your maximum heart rate = 220 - your age Never exercise at your maximum heart rate. See handout for target heart rate. 6.  Decrease carbohydrates (white bread, pasta, rice, potatoes, sweet foods and sweet drinks like soda, tea, coffee, juice and sports drinks). Increase fiber and protein. Goal:   calories daily of carbohydrates Try CHROMIUM PICONATE 200 MG THREE TIMES DAILY,  
 to decrease Premenstrual carbohydrate cravings. 7.  Eat 3-5 small meals daily, include lots of protein (beans/legumes, nuts, lean meat, eggs) and green vegetables with each. (Breakfast, lunch, dinner with 2 healthy snacks) 8. Get proper rest 7-8 hours uninterrupted. When you get less than 6 hours, it triggers hunger by affecting your Grehlin:Leptin ratio and this results in weight gain. 9.  Watch your food portions. Green leafy vegetables should cover 1/2 of the plate, lean meat 1/4 of the plate, starchy vegetable 1/4 of the plate. Use smaller plates. 10.  Do not eat until you are full. Eat until you are no longer hungry. If you are not sure, try drinking a glass of water before getting your second serving of food. 11.  Do not weigh yourself daily. Wait until your next office visit. Use how you feel and  how your clothes fit as measurements of success. 12.  Address your spirituality to draw strength from above during your journey. Remember \"I am fearfully and wonderfully made. Marvelous in His eyes. \" 
 
13. Set realistic, appropriate and achievable weight loss goals: 
 
 RECOMMENDED TARGET WEIGHT LOSS:  Initial weight loss of 5-10% of your initial body weight achieved over 6 months or a decrease of 2 BMI units. MINIMUM GOAL OF WEIGHT LOSS:  Reduce body weight and maintain a lower body weight. Prevent weight gain. RELATED WEBSITES:     
Www.obesityaction. org 
(and consider joining the Ob[employer] for $25/year. The [address] mission is to elevated and empower those affected by obesity through education, advocacy and support. Quarterly journals included in membership fee. ) Www.Whooch. [employer] 
www.[employer] RELATED DIETS:  Dr. Mireille Sena Weight loss challenge, Mediterranean diet, [place] Diet, [place] Watchers, Paleo Diet (anti-inflammatory diet) EXERCISE 150 MINUTES WEEKLY. THIS CAN BE ACHIEVED BY WORKING OUT OR WALKING A MINIMUM OF 30 MINUTES FOR 5 DAYS WEEKLY. YOU CAN EXERCISE IN INCREMENTS OF 10-15 MINUTES UP TO 3 TIMES A DAY. Consider performing \"brainless exercise. \"  Choose your favorite tv program.  Five minutes before and for 5 minutes after the tv program, stretch your body. While the program is on, walk in place watching the show. When commercials come on, rest or walk around the house to do other things. When the program begins, return to walking in place. When you are able keep walking during the commercials and add light weights to your ankles or hands. By the end of the show, you would have walked 30 minutes. If you need shorter spurts of exercise, walk during the commercials and rest during the show. Drink to glasses of water prior to any exercise to prevent dehydration and to improve the results of the work out. Your RESTING HEART RATE is the number of times your heart beats per minute when you are not exerting yourself. The more fit you are, the lower your resting heart rate will be. Your MAXIMUM HEART RATE is the number of times per minute your heart pumps when it is working at 100% capacity. NEVER EXERCISE AT YOUR MAXIMUM HEART RATE. MAX HEART RATE = 220 - your age For example, in a 48year old, the maximum heart rate is 220-50 = 170 beats per minute Your TARGET HEART RATE is the number of beats per minute our heart should pump during aerobic exercise. Reaching your target heart rate indicates that your body is receiving maximum cardiovascular and fat burning benefits. If you are fit, your TARGET HEART RATE = 70-85% of your maximum Heart rate For a 48year old with vigorous intensity physical activity, Target heart rate= 170 bpm x .70 = 119 bpm 
   Target heart rate = 170 bpm x .85=  145 bpm 
 
    Therefore, your target heart rate during physical activity is 119-145 If you are not fit, TARGET HEART RATE = 50-70% of your maximum Heart rate MODERATE CONSISTENT AEROBIC EXERCISE OR WALKING FOR AT LEAST 150 MINUTES WEEKLY IS AN ESSENTIAL PART OF ANY WEIGHT LOSS OF WEIGHT MAINTENANCE PROGRAM. During WEIGHT LOSS PHASE, at least 75% of your exercise needs to be walking or moderate aerobic activity and 25% or 0% can be Isometric or Resistance type exercises (the latter can be deferred to the weight maintenance phase.) During WEIGHT MAINTENANCE PHASE, at least 50% of your exercise needs to be aerobic and 50% Isometric or Resistance type exercises. BENEFITS OF MODERATE INTENSITY EXERCISE MOST DAYS OF THE WEEK: 
 
 1. Insulin Resistance improves 30-85% 2. Abdominal Fat decreases by 30% 3. Inflammatory Markers decrease by 30% (therefore pain decreases) 4.  Systolic and Diastolic Blood Pressure decrease by 4 mmHg 5. HDL improves by 5% 6. Triglycerides decrease by 15% 7. Shift from small dense LDL to large dense LDL (therefore decrease Insulin Resistance) 8. Decrease coagulability Saline Nasal Washes: Care Instructions Your Care Instructions Saline nasal washes help keep the nasal passages open by washing out thick or dried mucus. This simple remedy can help relieve symptoms of allergies, sinusitis, and colds. It also can make the nose feel more comfortable by keeping the mucous membranes moist. You may notice a little burning sensation in your nose the first few times you use the solution, but this usually gets better in a few days. Follow-up care is a key part of your treatment and safety. Be sure to make and go to all appointments, and call your doctor if you are having problems. It's also a good idea to know your test results and keep a list of the medicines you take. How can you care for yourself at home? · You can buy premixed saline solution in a squeeze bottle or other sinus rinse products at a drugstore. Read and follow the instructions on the label. · You also can make your own saline solution by adding 1 teaspoon of salt and 1 teaspoon of baking soda to 2 cups of distilled water. · If you use a homemade solution, pour a small amount into a clean bowl. Using a rubber bulb syringe, squeeze the syringe and place the tip in the salt water. Pull a small amount of the salt water into the syringe by relaxing your hand. · Sit down with your head tilted slightly back. Do not lie down. Put the tip of the bulb syringe or the squeeze bottle a little way into one of your nostrils. Gently drip or squirt a few drops into the nostril. Repeat with the other nostril. Some sneezing and gagging are normal at first. 
· Gently blow your nose. · Wipe the syringe or bottle tip clean after each use. · Repeat this 2 or 3 times a day. · Use nasal washes gently if you have nosebleeds often. When should you call for help? Watch closely for changes in your health, and be sure to contact your doctor if: 
? · You often get nosebleeds. ? · You have problems doing the nasal washes. Where can you learn more? Go to http://tl-aguilar.info/. Enter [id number] in the search box to learn more about \"Saline Nasal Washes: Care Instructions. \" Current as of: May 12, 2017 Content Version: 11.4 © 3535-1832 [employer]. Care instructions adapted under license by [employer] (which disclaims liability or warranty for this information). If you have questions about a medical condition or this instruction, always ask your healthcare professional. [address] any warranty or liability for your use of this information. Introducing hospitals & HEALTH SERVICES! Dear [place]: Thank you for requesting a [employer] account. Our records indicate that you already have an active [employer] account. You can access your account anytime at [email or web address] Did you know that you can access your hospital and ER discharge instructions at any time in [employer]? You can also review all of your test results from your hospital stay or ER visit. Additional Information If you have questions, please visit the Frequently Asked Questions section of the [employer] website at [email or web address] Remember, [employer] is NOT to be used for urgent needs. For medical emergencies, dial 911. Now available from your iPhone and Android! Please provide this summary of care documentation to your next provider. Your primary care clinician is listed as Tammi Badillo. If you have any questions after today's visit, please call [phone]. Detail Level: Detailed Quality 110: Preventive Care And Screening: Influenza Immunization: Influenza immunization was not ordered or administered, reason not given Quality 131: Pain Assessment And Follow-Up: Pain assessment using a standardized tool is documented as negative, no follow-up plan required

## 2021-06-06 NOTE — DISCHARGE INSTRUCTIONS
Exposure to Toxins: Care Instructions  Your Care Instructions  Toxins are poisonous substances that can harm your body. If your doctor is concerned that your symptoms are caused by exposure to a toxic substance, he or she may ask you about your home, your work, your family, and other aspects of your environment. You also may have blood tests or X-rays to find out if a toxin is in your body. For example, you may have been around smoke from a fire. Or you may have been around fumes from paints, solvents, or waste products from workshops or factories. But in some cases it may be hard to find out what you may have been exposed to. Sometimes it can take years before you have symptoms. For instance, a  may have lung disease many years after working in mines. And being exposed to some toxins can make health problems you already have worse. Follow-up care is a key part of your treatment and safety. Be sure to make and go to all appointments, and call your doctor if you are having problems. It's also a good idea to know your test results and keep a list of the medicines you take. How can you care for yourself at home? · If you think you may have been exposed to a toxin but are not sure what it might be, try to keep a written record of your symptoms. Note when and where you have symptoms. And note any possible health hazards. For example, you may find out that you feel sick to your stomach during your workweek, but you feel better on weekends. · It may help to increase the amount of fresh air in your home. · If you can, try to control your exposure to hazardous materials. ¨ Avoid cigarette smoke. Cigarettes contain many chemicals that are hazardous to your health. ¨ Keep your home clean and as free from dust as you can. Dust can irritate your lungs. ¨ Get a carbon monoxide alarm for your home. Carbon monoxide comes from cars, space heaters, and other heat sources. It can be deadly.   ¨ When you use cleaning or home improvement products, make sure to open windows or use an exhaust fan. Never mix household chemicals, such as chlorine and ammonia. Some mixtures can create toxic fumes that can be deadly. ¨ Read the label on house and garden chemicals. Be sure to follow all safety directions. Try to limit your use of lawn and garden pesticides. ¨ Keep in mind that the farther away you are from a hazardous source, the less exposure you will receive. When should you call for help? Call 911 anytime you think you may need emergency care. For example, call if:  · You have trouble breathing. Call your doctor now or seek immediate medical care if:  · You get household chemicals in your mouth or eyes. Call the [address] [place] [address] ([phone]). · You think you may have been exposed to a hazardous material.  Watch closely for changes in your health, and be sure to contact your doctor if:  · You do not get better as expected. Where can you learn more? Go to http://tl-aguilar.info/. Enter B226 in the search box to learn more about \"Exposure to Toxins: Care Instructions. \"  Current as of: July 29, 2016  Content Version: 11.3  © 1045-0992 [employer]. Care instructions adapted under license by [employer] (which disclaims liability or warranty for this information). If you have questions about a medical condition or this instruction, always ask your healthcare professional. [place] [address] any warranty or liability for your use of this information. We hope that we have addressed all of your medical concerns. The examination and treatment you received in the Emergency Department were for an emergent problem and were not intended as complete care. It is important that you follow up with your healthcare provider(s) for ongoing care.  If your symptoms worsen or do not improve as expected, and you are unable to reach your usual health care provider(s), you should return to the Emergency Department. Today's healthcare is undergoing tremendous change, and patient satisfaction surveys are one of the many tools to assess the quality of medical care. You may receive a survey from the [employer] regarding your experience in the Emergency Department. I hope that your experience has been completely positive, particularly the medical care that I provided. As such, please participate in the survey; anything less than excellent does not meet my expectations or intentions. [facility] and [facility] participate in nationally recognized quality of care measures. If your blood pressure is greater than 120/80, as reported below, we urge that you seek medical care to address the potential of high blood pressure, commonly known as hypertension. Hypertension can be hereditary or can be caused by certain medical conditions, pain, stress, or \"white coat syndrome. \"       Please make an appointment with your health care provider(s) for follow up of your Emergency Department visit. VITALS:   Patient Vitals for the past 8 hrs:   Temp Pulse Resp BP SpO2   07/17/17 1430 - - - (!) 153/93 98 %   07/17/17 1400 - - - 172/82 99 %   07/17/17 1229 98.3 °F (36.8 °C) 95 20 (!) 194/99 98 %          Thank you for allowing us to provide you with medical care today. We realize that you have many choices for your emergency care needs. Please choose us in the future for any continued health care needs. Anna Hooker, [address] Ji: [phone]            Recent Results (from the past 24 hour(s))   EKG, 12 LEAD, INITIAL    Collection Time: 07/17/17  1:40 PM   Result Value Ref Range    Ventricular Rate 83 BPM    Atrial Rate 83 BPM    P-R Interval 156 ms    QRS Duration 76 ms    Q-T Interval 382 ms    QTC Calculation (Bezet) 448 ms    Calculated P Axis 57 degrees Calculated R Axis 7 degrees    Calculated T Axis 4 degrees    Diagnosis       Normal sinus rhythm  Voltage criteria for left ventricular hypertrophy  Nonspecific T wave abnormality  When compared with ECG of 16-MAR-2016 11:26,  Nonspecific T wave abnormality now evident in Lateral leads     CBC WITH AUTOMATED DIFF    Collection Time: 07/17/17  1:43 PM   Result Value Ref Range    WBC 11.8 (H) 3.6 - 11.0 K/uL    RBC 4.76 3.80 - 5.20 M/uL    HGB 9.6 (L) 11.5 - 16.0 g/dL    HCT 33.0 (L) 35.0 - 47.0 %    MCV 69.3 (L) 80.0 - 99.0 FL    MCH 20.2 (L) 26.0 - 34.0 PG    MCHC 29.1 (L) 30.0 - 36.5 g/dL    RDW 18.4 (H) 11.5 - 14.5 %    PLATELET 653 (H) 852 - 400 K/uL    NEUTROPHILS 67 32 - 75 %    LYMPHOCYTES 24 12 - 49 %    MONOCYTES 6 5 - 13 %    EOSINOPHILS 3 0 - 7 %    BASOPHILS 0 0 - 1 %    ABS. NEUTROPHILS 7.9 1.8 - 8.0 K/UL    ABS. LYMPHOCYTES 2.8 0.8 - 3.5 K/UL    ABS. MONOCYTES 0.7 0.0 - 1.0 K/UL    ABS. EOSINOPHILS 0.4 0.0 - 0.4 K/UL    ABS. BASOPHILS 0.0 0.0 - 0.1 K/UL    DF SMEAR SCANNED      RBC COMMENTS ANISOCYTOSIS  1+        RBC COMMENTS HYPOCHROMIA  1+        RBC COMMENTS MICROCYTOSIS  1+       METABOLIC PANEL, COMPREHENSIVE    Collection Time: 07/17/17  1:43 PM   Result Value Ref Range    Sodium 137 136 - 145 mmol/L    Potassium 3.2 (L) 3.5 - 5.1 mmol/L    Chloride 101 97 - 108 mmol/L    CO2 29 21 - 32 mmol/L    Anion gap 7 5 - 15 mmol/L    Glucose 135 (H) 65 - 100 mg/dL    BUN 8 6 - 20 MG/DL    Creatinine 1.02 0.55 - 1.02 MG/DL    BUN/Creatinine ratio 8 (L) 12 - 20      GFR est AA >60 >60 ml/min/1.73m2    GFR est non-AA 59 (L) >60 ml/min/1.73m2    Calcium 8.7 8.5 - 10.1 MG/DL    Bilirubin, total 0.1 (L) 0.2 - 1.0 MG/DL    ALT (SGPT) 24 12 - 78 U/L    AST (SGOT) 15 15 - 37 U/L    Alk.  phosphatase 111 45 - 117 U/L    Protein, total 8.5 (H) 6.4 - 8.2 g/dL    Albumin 3.2 (L) 3.5 - 5.0 g/dL    Globulin 5.3 (H) 2.0 - 4.0 g/dL    A-G Ratio 0.6 (L) 1.1 - 2.2     TROPONIN I    Collection Time: 07/17/17  1:43 PM Result Value Ref Range    Troponin-I, Qt. <0.04 <0.05 ng/mL   POC G3 - PUL    Collection Time: 07/17/17  2:22 PM   Result Value Ref Range    FIO2 (POC) 0.21 %    pH (POC) 7.389 7.35 - 7.45      pCO2 (POC) 44.0 35.0 - 45.0 MMHG    pO2 (POC) 98 80 - 100 MMHG    HCO3 (POC) 26.6 (H) 22 - 26 MMOL/L    sO2 (POC) 97 92 - 97 %    Base excess (POC) 2 mmol/L    Site LEFT RADIAL      Device: ROOM AIR      Allens test (POC) YES      Specimen type (POC) ARTERIAL         No results found.       C.O,. Was 1.7-- extremely low independent

## 2022-03-19 PROBLEM — H69.90 ETD (EUSTACHIAN TUBE DYSFUNCTION): Status: ACTIVE | Noted: 2017-07-06

## 2022-03-19 PROBLEM — H69.80 ETD (EUSTACHIAN TUBE DYSFUNCTION): Status: ACTIVE | Noted: 2017-07-06

## 2022-03-19 PROBLEM — J30.2 SEASONAL ALLERGIC RHINITIS: Status: ACTIVE | Noted: 2017-03-22

## 2022-03-19 PROBLEM — I10 ESSENTIAL HYPERTENSION, BENIGN: Status: ACTIVE | Noted: 2017-03-22

## 2022-03-20 PROBLEM — D72.829 LEUKOCYTOSIS: Status: ACTIVE | Noted: 2017-03-22

## 2022-03-20 PROBLEM — D75.839 THROMBOCYTOSIS: Status: ACTIVE | Noted: 2017-03-22

## 2023-01-12 ENCOUNTER — TELEPHONE (OUTPATIENT)
Dept: FAMILY MEDICINE CLINIC | Age: 48
End: 2023-01-12

## 2023-01-12 NOTE — TELEPHONE ENCOUNTER
----- Message from [facility] sent at 1/12/2023 10:11 AM EST -----  Subject: Appointment Request    Reason for Call: New Patient/New to Provider Appointment needed: New   Patient Request Appointment    QUESTIONS    Reason for appointment request? No appointments available during search     Additional Information for Provider? Pt is wanting to get established with   Jose Carlos Brand MD. She screened green, She was in the ED last night and was   told to get established. She was referred to Dr. Mireya Sandoval by Alma Alba her previous PCP.  Call pt back to let her know if she is able to   get scheduled as a new patient.   ---------------------------------------------------------------------------  --------------  Jas URIBE  [phone]; OK to leave message on voicemail  ---------------------------------------------------------------------------  --------------  SCRIPT ANSWERS  COVID Screen: Jimbo Roman

## 2023-01-21 ENCOUNTER — HOSPITAL ENCOUNTER (EMERGENCY)
Age: 48
Discharge: HOME | End: 2023-01-21
Attending: EMERGENCY MEDICINE
Payer: COMMERCIAL

## 2023-01-21 ENCOUNTER — APPOINTMENT (OUTPATIENT)
Dept: ULTRASOUND IMAGING | Age: 48
End: 2023-01-21
Attending: EMERGENCY MEDICINE
Payer: COMMERCIAL

## 2023-01-21 VITALS
TEMPERATURE: 98 F | HEART RATE: 78 BPM | BODY MASS INDEX: 32.1 KG/M2 | SYSTOLIC BLOOD PRESSURE: 194 MMHG | DIASTOLIC BLOOD PRESSURE: 92 MMHG | OXYGEN SATURATION: 99 % | RESPIRATION RATE: 18 BRPM | HEIGHT: 64 IN | WEIGHT: 188 LBS

## 2023-01-21 DIAGNOSIS — D25.9 UTERINE LEIOMYOMA, UNSPECIFIED LOCATION: Primary | ICD-10-CM

## 2023-01-21 DIAGNOSIS — D64.9 ANEMIA, UNSPECIFIED TYPE: ICD-10-CM

## 2023-01-21 LAB
ALBUMIN SERPL-MCNC: 3.5 G/DL (ref 3.5–5)
ALBUMIN/GLOB SERPL: 0.8 (ref 1.1–2.2)
ALP SERPL-CCNC: 79 U/L (ref 45–117)
ALT SERPL-CCNC: 14 U/L (ref 12–78)
ANION GAP SERPL CALC-SCNC: 3 MMOL/L (ref 5–15)
AST SERPL-CCNC: 13 U/L (ref 15–37)
BASOPHILS # BLD: 0 K/UL (ref 0–0.1)
BASOPHILS NFR BLD: 0 % (ref 0–1)
BILIRUB SERPL-MCNC: 0.3 MG/DL (ref 0.2–1)
BUN SERPL-MCNC: 13 MG/DL (ref 6–20)
BUN/CREAT SERPL: 13 (ref 12–20)
CALCIUM SERPL-MCNC: 9.1 MG/DL (ref 8.5–10.1)
CHLORIDE SERPL-SCNC: 104 MMOL/L (ref 97–108)
CO2 SERPL-SCNC: 28 MMOL/L (ref 21–32)
COMMENT, HOLDF: NORMAL
CREAT SERPL-MCNC: 1.03 MG/DL (ref 0.55–1.02)
DIFFERENTIAL METHOD BLD: ABNORMAL
EOSINOPHIL # BLD: 0.7 K/UL (ref 0–0.4)
EOSINOPHIL NFR BLD: 6 % (ref 0–7)
ERYTHROCYTE [DISTWIDTH] IN BLOOD BY AUTOMATED COUNT: 34 % (ref 11.5–14.5)
GLOBULIN SER CALC-MCNC: 4.5 G/DL (ref 2–4)
GLUCOSE SERPL-MCNC: 82 MG/DL (ref 65–100)
HCT VFR BLD AUTO: 32.4 % (ref 35–47)
HGB BLD-MCNC: 7.7 G/DL (ref 11.5–16)
IMM GRANULOCYTES # BLD AUTO: 0.1 K/UL (ref 0–0.04)
IMM GRANULOCYTES NFR BLD AUTO: 1 % (ref 0–0.5)
LYMPHOCYTES # BLD: 1.8 K/UL (ref 0.8–3.5)
LYMPHOCYTES NFR BLD: 15 % (ref 12–49)
MCH RBC QN AUTO: 16.1 PG (ref 26–34)
MCHC RBC AUTO-ENTMCNC: 23.8 G/DL (ref 30–36.5)
MCV RBC AUTO: 67.8 FL (ref 80–99)
MONOCYTES # BLD: 0.7 K/UL (ref 0–1)
MONOCYTES NFR BLD: 6 % (ref 5–13)
NEUTS SEG # BLD: 8.5 K/UL (ref 1.8–8)
NEUTS SEG NFR BLD: 72 % (ref 32–75)
NRBC # BLD: 0 K/UL (ref 0–0.01)
NRBC BLD-RTO: 0 PER 100 WBC
PLATELET # BLD AUTO: 330 K/UL (ref 150–400)
POTASSIUM SERPL-SCNC: 4.1 MMOL/L (ref 3.5–5.1)
PROT SERPL-MCNC: 8 G/DL (ref 6.4–8.2)
RBC # BLD AUTO: 4.78 M/UL (ref 3.8–5.2)
RBC MORPH BLD: ABNORMAL
SAMPLES BEING HELD,HOLD: NORMAL
SODIUM SERPL-SCNC: 135 MMOL/L (ref 136–145)
WBC # BLD AUTO: 11.8 K/UL (ref 3.6–11)

## 2023-01-21 PROCEDURE — 80053 COMPREHEN METABOLIC PANEL: CPT

## 2023-01-21 PROCEDURE — 74011250636 HC RX REV CODE- 250/636: Performed by: EMERGENCY MEDICINE

## 2023-01-21 PROCEDURE — 99284 EMERGENCY DEPT VISIT MOD MDM: CPT

## 2023-01-21 PROCEDURE — 85025 COMPLETE CBC W/AUTO DIFF WBC: CPT

## 2023-01-21 PROCEDURE — 36415 COLL VENOUS BLD VENIPUNCTURE: CPT

## 2023-01-21 PROCEDURE — 76830 TRANSVAGINAL US NON-OB: CPT

## 2023-01-21 PROCEDURE — 76856 US EXAM PELVIC COMPLETE: CPT

## 2023-01-21 PROCEDURE — 96374 THER/PROPH/DIAG INJ IV PUSH: CPT

## 2023-01-21 RX ORDER — LISINOPRIL 40 MG/1
40 TABLET ORAL DAILY
COMMUNITY

## 2023-01-21 RX ORDER — KETOROLAC TROMETHAMINE 30 MG/ML
30 INJECTION, SOLUTION INTRAMUSCULAR; INTRAVENOUS
Status: COMPLETED | OUTPATIENT
Start: 2023-01-21 | End: 2023-01-21

## 2023-01-21 RX ADMIN — KETOROLAC TROMETHAMINE 30 MG: 30 INJECTION, SOLUTION INTRAMUSCULAR; INTRAVENOUS at 17:30

## 2023-01-21 NOTE — PROGRESS NOTES
Admission Medication Reconciliation:    Information obtained from:  Patient interview, Recent prescription fill history, chart review. RxQuery data available¹:  YES    Comments/Recommendations: Updated PTA meds/reviewed patient's allergies. Medication changes (since last review): Adjusted  - Lisinopril 40mg every day     Removed  - potassium chloride tab  - singulair tab  - zyrtec     ¹RxQuery pharmacy benefit data reflects medications filled and processed through the patient's insurance, however   this data does NOT capture whether the medication was picked up or is currently being taken by the patient. Allergies:  Bactrim [sulfamethoxazole-trimethoprim]    Significant PMH/Disease States:   Past Medical History:   Diagnosis Date    Allergy, unspecified not elsewhere classified     Anemia     Ankle sprain 03/2009    bilateral.  Dr. Silvia Whitney childhood    Breast mass 5/5/09    Left. benign. Chickenpox childhood    Ectopic pregnancy 04/2010    Right. Dr. Jeanna Vu    Essential hypertension, benign     Menorrhagia 08/2015    Dr. Shweta Cunha    Ovarian cyst 2011    R. Dr. Jeanna Vu    Pure hypercholesterolemia 05/2009    Thrombocytosis ([facility] [address] 05/2009    Tubal pregnancy 06/2011    Dr Woodson Ano D deficiency 05/2009     Chief Complaint for this Admission:    Chief Complaint   Patient presents with    Fibroids    Hypertension     Prior to Admission Medications:   Prior to Admission Medications   Prescriptions Last Dose Informant Taking? PROAIR HFA 90 mcg/actuation inhaler  Self No   Sig: INHALE 2 PUFFS BY MOUTH EVERY 4 HOURS AS NEEDED FOR WHEEZING   ferrous sulfate 325 mg (65 mg iron) tablet 1/21/2023 Self Yes   Sig: Take  by mouth Daily (before breakfast). lisinopriL (PRINIVIL, ZESTRIL) 40 mg tablet 1/21/2023 Self Yes   Sig: Take 40 mg by mouth daily.    mometasone (ASMANEX TWISTHALER) 220 mcg (30 doses) inhaler  Self No   Sig: Take 1 Puff by inhalation two (2) times a day. Indications: MAINTENANCE THERAPY FOR ASTHMA      Facility-Administered Medications: None     Please contact the main inpatient pharmacy with any questions or concerns at [phone] and we will direct you to the clinical pharmacist covering this patient's care while in-house.    [employer]

## 2023-01-21 NOTE — DISCHARGE INSTRUCTIONS
Thank you for allowing us to provide you with medical care today. We realize that you have many choices for your emergency care needs. We thank you for choosing [facility]. Please choose us in the future for any continued health care needs. The exam and treatment you received in the Emergency Department were for an emergent problem and are not intended as complete care. It is important that you follow up with a doctor, nurse practitioner, or physician's assistant for ongoing care. If your symptoms worsen or you do not improve as expected and you are unable to reach your usual health care provider, you should return to the Emergency Department. We are available 24 hours a day. Please make an appointment with your health care provider(s) for follow up of your Emergency Department visit. Take this sheet with you when you go to your follow-up visit.

## 2023-01-21 NOTE — ED PROVIDER NOTES
22-year-old female with a history of anemia, asthma, ovarian cyst presents with a chief complaint of abdominal cramping and vaginal bleeding. Patient reports she was seen recently in [facility] Emergency department and diagnosed with fibroids. She was also hypertensive at that time and started on lisinopril. She was seen by OB and they recommended D&C after blood pressure control. Patient reports persistent symptoms and came to [facility] today because she likes [facility] more than [facility]. Past Medical History:   Diagnosis Date    Allergy, unspecified not elsewhere classified     Anemia     Ankle sprain 03/2009    bilateral.  Dr. Melly Miguel childhood    Breast mass 5/5/09    Left. benign. Chickenpox childhood    Ectopic pregnancy 04/2010    Right. Dr. Julius Tejeda    Essential hypertension, benign     Menorrhagia 08/2015    Dr. Lizbeth Fairchild    Ovarian cyst 2011    R. Dr. Julius Tejeda    Pure hypercholesterolemia 05/2009    Thrombocytosis ([address] 05/2009    Tubal pregnancy 06/2011    Dr Julius Tejeda     Vitamin D deficiency 05/2009       Past Surgical History:   Procedure Laterality Date    HX DILATION AND CURETTAGE  8/11/15    Dr. Lizbeth Fairchild. due to heavy vag bleeding and thick endometrial stripe. HX SALPINGO-OOPHORECTOMY Right 04/2010    Right. due to ectopic pregnancy. Dr. Julius Tejeda. HX TUBAL LIGATION  2002    Dr. Lizbeth Fairchild         Family History:   Problem Relation Age of Onset    Diabetes Mother         type ll    Hypertension Mother     Diabetes Father         type ll       Social History     Socioeconomic History    Marital status:      Spouse name: Not on file    Number of children: Not on file    Years of education: Not on file    Highest education level: Not on file   Occupational History    Not on file   Tobacco Use    Smoking status: Never    Smokeless tobacco: Never   Substance and Sexual Activity    Alcohol use: No    Drug use:  No Sexual activity: Yes     Partners: Male     Birth control/protection: Surgical     Comment: TL   Other Topics Concern    Not on file   Social History Narrative    Not on file     Social Determinants of Health     Financial Resource Strain: Not on file   Food Insecurity: Not on file   Transportation Needs: Not on file   Physical Activity: Not on file   Stress: Not on file   Social Connections: Not on file   Intimate Partner Violence: Not on file   Housing Stability: Not on file         ALLERGIES: Bactrim [sulfamethoxazole-trimethoprim]    Review of Systems   Constitutional:  Negative for fever. Gastrointestinal:  Positive for abdominal pain. Vitals:    01/21/23 1519   BP: (!) 188/97   Pulse: 88   Resp: 18   Temp: 98.5 °F (36.9 °C)   SpO2: 100%   Weight: 85.3 kg (188 lb)   Height: 5' 4\" (1.626 m)            Physical Exam  Vitals and nursing note reviewed. Constitutional:       General: She is not in acute distress. Appearance: Normal appearance. She is not ill-appearing, toxic-appearing or diaphoretic. HENT:      Head: Normocephalic and atraumatic. Eyes:      Extraocular Movements: Extraocular movements intact. Cardiovascular:      Rate and Rhythm: Normal rate. Pulses: Normal pulses. Pulmonary:      Effort: Pulmonary effort is normal. No respiratory distress. Abdominal:      General: There is no distension. Musculoskeletal:         General: Normal range of motion. Cervical back: Normal range of motion. Skin:     General: Skin is dry. Neurological:      Mental Status: She is alert and oriented to person, place, and time. Psychiatric:         Mood and Affect: Mood normal.        Medical Decision Making      Patient presents with cramping. Patient had vaginal bleeding this morning which is now subsided. CBC shows a hemoglobin of 7.7. Competence of metabolic panel essentially unremarkable. Endovaginal ultrasound shows myomatous uterus with complex ovarian cyst on the left.   Will refer to [facility] for outpatient work-up. Patient given strict return precautions regarding worsening bleeding, shortness of breath or lightheadedness. Discussed my clinical impression(s), any labs and/or radiology results with the patient. I answered any questions and addressed any concerns. Discussed the importance of following up with their primary care physician and/or specialist(s). Discussed signs or symptoms that would warrant return back to the ER for further evaluation. The patient is agreeable with discharge. Amount and/or Complexity of Data Reviewed  Labs: ordered. Radiology: ordered. ECG/medicine tests: ordered. Risk  Prescription drug management.            Procedures

## 2023-01-21 NOTE — ED NOTES
The provider has  reviewed discharge instructions with the patient. The patient verbalized understanding. The patient left the Emergency Department ambulatory, alert and oriented and in no acute distress. The patient was encouraged to call or return to the ED for worsening issues or problems and was encouraged to schedule a follow up appointment for continuing care.      The patient verbalized understanding of discharge instructions and prescriptions, all questions were answered. The patient has no further concerns at this time.

## 2023-01-21 NOTE — ED TRIAGE NOTES
Pt ambulatory to ED with c/o lower abdominal pain onset 7 days ago. Seen at [address] [place] [address] ED on 1/18/23. Dx with Fibroids and advised to have Dilation and curettage after BP controlled. Pt started on Lisinopril 40 mg daily for HTN and advised to F/U with PCP.

## 2023-01-23 ENCOUNTER — TELEPHONE (OUTPATIENT)
Dept: OBGYN CLINIC | Age: 48
End: 2023-01-23

## 2023-01-26 ENCOUNTER — OFFICE VISIT (OUTPATIENT)
Dept: OBGYN CLINIC | Age: 48
End: 2023-01-26
Payer: COMMERCIAL

## 2023-01-26 VITALS — DIASTOLIC BLOOD PRESSURE: 96 MMHG | BODY MASS INDEX: 30.73 KG/M2 | SYSTOLIC BLOOD PRESSURE: 180 MMHG | WEIGHT: 179 LBS

## 2023-01-26 DIAGNOSIS — I10 HYPERTENSION, UNSPECIFIED TYPE: Primary | ICD-10-CM

## 2023-01-26 DIAGNOSIS — N88.2 CERVICAL STENOSIS (UTERINE CERVIX): ICD-10-CM

## 2023-01-26 DIAGNOSIS — D64.9 ANEMIA, UNSPECIFIED TYPE: ICD-10-CM

## 2023-01-26 DIAGNOSIS — N83.201 CYST OF RIGHT OVARY: ICD-10-CM

## 2023-01-26 DIAGNOSIS — N92.0 MENORRHAGIA WITH REGULAR CYCLE: ICD-10-CM

## 2023-01-26 NOTE — PROGRESS NOTES
GYN Problem Visit    Leonila Hunter is a 50 y.o.  presenting for problem visit. Her main concern today is painful heavy menstrual bleeding. Went to ER and fibroids were found. HMB has been going on for years. She reports associated this dysmenorrhea that has been present for years. The symptoms acutely worsened last week and took her to the emergency room    US of pelvic and Transvaginal US from 1/21/23  1. Myomatous uterus. 2.  Complex right ovarian cyst as described above. Demonstrable blood flow to  the right ovary. 3.  Left ovary not visualized due to bowel gas/body habitus. 4.  Complex nabothian cyst.       Ob/Gyn Hx:  G 3P 2 A 1 - vaginal x2  LMP-12/27/22  Menses- regular  Contraception- surgical  SA-yes      Past Medical History:   Diagnosis Date    Allergy, unspecified not elsewhere classified     Anemia     Ankle sprain 03/2009    bilateral.  Dr. Vince Ulloa childhood    Breast mass 5/5/09    Left. benign. Chickenpox childhood    Ectopic pregnancy 04/2010    Right. Dr. Amanda Mack    Essential hypertension, benign     Menorrhagia 08/2015    Dr. Laura James    Ovarian cyst 2011    R. Dr. Amanda Mack    Pure hypercholesterolemia 05/2009    Thrombocytosis 05/2009    Tubal pregnancy 06/2011    Dr Amanda Mack     Vitamin D deficiency 05/2009       Past Surgical History:   Procedure Laterality Date    DILATION AND CURETTAGE      HX DILATION AND CURETTAGE  08/11/2015    Dr. Laura James. due to heavy vag bleeding and thick endometrial stripe. HX SALPINGO-OOPHORECTOMY Right 04/2010    Right. due to ectopic pregnancy. Dr. Amanda Mack.     HX TUBAL LIGATION  2002    Dr. Laura James       Family History   Problem Relation Age of Onset    Diabetes Mother         type ll    Hypertension Mother     Diabetes Father         type ll    Breast Cancer Sister     Breast Cancer Maternal Aunt        Social History     Socioeconomic History    Marital status:      Spouse name: Not on file    Number of children: Not on file    Years of education: Not on file    Highest education level: Not on file   Occupational History    Not on file   Tobacco Use    Smoking status: Never    Smokeless tobacco: Never   Vaping Use    Vaping Use: Never used   Substance and Sexual Activity    Alcohol use: No    Drug use: No    Sexual activity: Yes     Partners: Male     Birth control/protection: Surgical     Comment: TL   Other Topics Concern    Not on file   Social History Narrative    Not on file     Social Determinants of Health     Financial Resource Strain: Not on file   Food Insecurity: Not on file   Transportation Needs: Not on file   Physical Activity: Not on file   Stress: Not on file   Social Connections: Not on file   Intimate Partner Violence: Not on file   Housing Stability: Not on file       Current Outpatient Medications   Medication Sig Dispense Refill    lisinopriL (PRINIVIL, ZESTRIL) 40 mg tablet Take 40 mg by mouth daily. ferrous sulfate 325 mg (65 mg iron) tablet Take  by mouth Daily (before breakfast). PROAIR HFA 90 mcg/actuation inhaler INHALE 2 PUFFS BY MOUTH EVERY 4 HOURS AS NEEDED FOR WHEEZING (Patient not taking: Reported on 1/26/2023) 1 Inhaler 5    mometasone (ASMANEX TWISTHALER) 220 mcg (30 doses) inhaler Take 1 Puff by inhalation two (2) times a day.  Indications: MAINTENANCE THERAPY FOR ASTHMA (Patient not taking: Reported on 1/26/2023) 30 Cap 5       Allergies   Allergen Reactions    Bactrim [Sulfamethoxazole-Trimethoprim] Other (comments)     GI Upset       Review of Systems - History obtained from the patient  Constitutional: negative for weight loss, fever, night sweats  HEENT: negative for hearing loss, earache, congestion, snoring, sorethroat  CV: negative for chest pain, palpitations, edema  Resp: negative for cough, shortness of breath, wheezing  GI: negative for change in bowel habits, abdominal pain, black or bloody stools  : negative for frequency, dysuria, hematuria, vaginal discharge  MSK: negative for back pain, joint pain, muscle pain  Breast: negative for breast lumps, nipple discharge, galactorrhea  Skin :negative for itching, rash, hives  Neuro: negative for dizziness, headache, confusion, weakness  Psych: negative for anxiety, depression, change in mood  Heme/lymph: negative for bleeding, bruising, pallor    Physical Exam    Visit Vitals  BP (!) 180/96   Wt 179 lb (81.2 kg)   LMP 12/27/2022   BMI 30.73 kg/m²         OBGyn Exam      Constitutional  Appearance: well-nourished, well developed, alert, in no acute distress    HENT  Head and Face: appears normal      Chest  Respiratory Effort: non-labored breathing    Cardiovascular  Extremities: no peripheral edema    Gastrointestinal  Abdominal Examination: abdomen non-distended, non-tender to palpation, no masses present  Liver and spleen: no hepatomegaly present, spleen not palpable  Hernias: no hernias identified    Genitourinary  External Genitalia: normal appearance for age, no discharge present, no tenderness present, no inflammatory lesions present, no masses present, no atrophy present  Vagina: normal vaginal vault without central or paravaginal defects, no discharge present, no inflammatory lesions present, no masses present  Bladder: non-tender to palpation  Urethra: appears normal  Cervix: normal   Uterus: enlarged to ~14 weeks size mobile   Adnexa: no adnexal tenderness present, no adnexal masses present  Perineum: perineum within normal limits, no evidence of trauma, no rashes or skin lesions present    Skin  General Inspection: no rash, no lesions identified    Neurologic/Psychiatric  Mental Status:  Orientation: grossly oriented to person, place and time  Mood and Affect: mood normal, affect appropriate      Assessment/Plan:  Maria C Armijo IS A 50 y.o.   1. Enlarged Fibroid Uterus  2. Hypertension, uncontrolled  3. Menorrhagia with regular cycle  4. Anemia  5. Ovarian cyst, right complex  6. Cervical stenosis   -We discussed the above in detail. Enlarged fibroid uterus likely the cause of her heavy menstrual bleeding. An endometrial biopsy was attempted today however due to cervical stenosis that was unsuccessful. The patient tolerated this well. Discussed all treatment options to control heavy menstrual bleeding in relation to enlarged fibroid uterus. We discussed expectant management versus medical management versus surgical intervention. Patient declines any form of surgical intervention at this time. Patient is a poor candidate for Myfembree or Lethea Ulices given uncontrolled chronic hypertension. I asked the patient to see her primary care physician ASAP to address her chronic hypertension. Patient desires medical intervention for her heavy menstrual bleeding secondary to fibroids. I did give her 2 samples of Slynd today. Slynd will be sent to her pharmacy. We discussed her complex right ovarian cyst in detail. We will continue with expectant management for now. An ultrasound will be ordered in 3 months to reassess that ovary. Patient denies right lower quadrant pain. Regarding the patient's significant anemia she was given bleeding precautions. She is currently taking daily iron. We discussed the risk, benefits, indications, alternatives side effects and possible complications of hysteroscopy D&C with MyoSure in an attempt to obtain an endometrial sample. Not amenable. Surgery request sent to surgery scheduler. Patient will be contacted. Patient to follow-up postop      Procedure note: Endometrial biopsy    [facility] is a No obstetric history on file. ,  50 y.o. female BLACK/ Patient's last menstrual period was 12/27/2022. The patient has a history of The primary encounter diagnosis was Hypertension, unspecified type.  Diagnoses of Menorrhagia with regular cycle, Anemia, unspecified type, Cyst of right ovary, and Cervical stenosis (uterine cervix) were also pertinent to this visit. and presents for an endometrial biopsy. Indications:   After the indications, risks, benefits, and alternatives to performing an endometrial biopsy were explained to the patient, her questions were answered and informed consent was obtained. Procedure: The patient was placed on the table in the dorsal lithotomy position. A bimanual exam showed the uterus to be anterior. The uterus was enlarged. A speculum was placed in the vagina. The cervix was visualized and prepped with betadine. A tenaculum was placed on the anterior lip of the cervix for traction. It was  necessary to dilate the cervix. A pipelle was not able to be passed through the endocervical canal.   The patient tolerated the procedure well and she reported mild cramping. The tenaculum and speculum were removed. Post Procedural Status:  She had mild cramping at the time of discharge. There were no complications. The patient was discharged in stable condition. Patient will return to office one week post op         Susie García MD

## 2023-02-16 ENCOUNTER — TELEPHONE (OUTPATIENT)
Dept: FAMILY MEDICINE CLINIC | Age: 48
End: 2023-02-16

## 2023-02-16 ENCOUNTER — OFFICE VISIT (OUTPATIENT)
Dept: FAMILY MEDICINE CLINIC | Age: 48
End: 2023-02-16
Payer: COMMERCIAL

## 2023-02-16 VITALS
BODY MASS INDEX: 30.73 KG/M2 | DIASTOLIC BLOOD PRESSURE: 96 MMHG | WEIGHT: 180 LBS | TEMPERATURE: 97 F | HEIGHT: 64 IN | HEART RATE: 85 BPM | SYSTOLIC BLOOD PRESSURE: 182 MMHG | RESPIRATION RATE: 20 BRPM | OXYGEN SATURATION: 98 %

## 2023-02-16 DIAGNOSIS — Z12.11 ENCOUNTER FOR COLORECTAL CANCER SCREENING: ICD-10-CM

## 2023-02-16 DIAGNOSIS — E55.9 VITAMIN D DEFICIENCY: ICD-10-CM

## 2023-02-16 DIAGNOSIS — R35.0 FREQUENCY OF URINATION: ICD-10-CM

## 2023-02-16 DIAGNOSIS — Z00.00 ENCOUNTER FOR MEDICAL EXAMINATION TO ESTABLISH CARE: Primary | ICD-10-CM

## 2023-02-16 DIAGNOSIS — D50.9 HYPOCHROMIC-MICROCYTIC ANEMIA: ICD-10-CM

## 2023-02-16 DIAGNOSIS — J45.20 RAD (REACTIVE AIRWAY DISEASE) WITH WHEEZING, MILD INTERMITTENT, UNCOMPLICATED: ICD-10-CM

## 2023-02-16 DIAGNOSIS — Z12.31 ENCOUNTER FOR SCREENING MAMMOGRAM FOR BREAST CANCER: ICD-10-CM

## 2023-02-16 DIAGNOSIS — Z12.12 ENCOUNTER FOR COLORECTAL CANCER SCREENING: ICD-10-CM

## 2023-02-16 DIAGNOSIS — I10 HYPERTENSION GOAL BP (BLOOD PRESSURE) < 130/80: ICD-10-CM

## 2023-02-16 DIAGNOSIS — J45.20 MILD INTERMITTENT ASTHMA WITHOUT COMPLICATION: ICD-10-CM

## 2023-02-16 DIAGNOSIS — Z11.59 NEED FOR HEPATITIS C SCREENING TEST: ICD-10-CM

## 2023-02-16 DIAGNOSIS — E78.00 HYPERCHOLESTEROLEMIA: ICD-10-CM

## 2023-02-16 DIAGNOSIS — Z13.29 SCREENING FOR THYROID DISORDER: ICD-10-CM

## 2023-02-16 DIAGNOSIS — Z13.1 SCREENING FOR DIABETES MELLITUS (DM): ICD-10-CM

## 2023-02-16 PROCEDURE — 3074F SYST BP LT 130 MM HG: CPT | Performed by: INTERNAL MEDICINE

## 2023-02-16 PROCEDURE — 3078F DIAST BP <80 MM HG: CPT | Performed by: INTERNAL MEDICINE

## 2023-02-16 PROCEDURE — 99386 PREV VISIT NEW AGE 40-64: CPT | Performed by: INTERNAL MEDICINE

## 2023-02-16 RX ORDER — ALBUTEROL SULFATE 90 UG/1
2 AEROSOL, METERED RESPIRATORY (INHALATION)
Qty: 1 EACH | Refills: 5 | Status: SHIPPED | OUTPATIENT
Start: 2023-02-16

## 2023-02-16 RX ORDER — NIFEDIPINE 30 MG/1
30 TABLET, FILM COATED, EXTENDED RELEASE ORAL DAILY
Qty: 30 TABLET | Refills: 0 | Status: SHIPPED | OUTPATIENT
Start: 2023-02-16 | End: 2023-02-22 | Stop reason: SDUPTHER

## 2023-02-16 NOTE — PROGRESS NOTES
Chief Complaint   Patient presents with    New Patient    Hypertension     HPI:  Neptali Chilel is a 50 y.o. AA female with h/o hypertension presents to \[facility]\"" care. Blood pressure reading is markedly elevated on lisinopril 40 mg .    Review of Systems  Constitutional: negative for fevers/chills  Eyes:   negative for visual disturbance   Respiratory:  negative for cough, dyspnea,wheezing  CV:   No chest pain, palpitations, lower extremity edema  GI:   No nausea, abdominal pain  Endo:               negative for polyuria,polydipsia,polyphagia,heat intolerance  Genitourinary: negative for frequency, dysuria and hematuria  Integument:  negative for rash and pruritus  Hematologic:  negative for easy bruising and gum/nose bleeding  Musculoskel: negative for myalgias, arthralgias, joint pain  Neurological:  negative for headaches, dizziness  Behavl/Psych: negative for feelings of anxiety, depression, mood changes    Past Medical History:   Diagnosis Date    Allergy, unspecified not elsewhere classified     Anemia     Ankle sprain 03/2009    bilateral.  Dr. Kaylah Manzanares childhood    Breast mass 5/5/09    Left. benign. Chickenpox childhood    Ectopic pregnancy 04/2010    Right. Dr. Eddie Price    Essential hypertension, benign     Menorrhagia 08/2015    Dr. Ligia Otero    Ovarian cyst 2011    R. Dr. Eddie Price    Pure hypercholesterolemia 05/2009    Thrombocytosis 05/2009    Tubal pregnancy 06/2011    Dr Eddie Price     Vitamin D deficiency 05/2009     Past Surgical History:   Procedure Laterality Date    DILATION AND CURETTAGE      HX DILATION AND CURETTAGE  08/11/2015    Dr. Ligia Otero. due to heavy vag bleeding and thick endometrial stripe. HX SALPINGO-OOPHORECTOMY Right 04/2010    Right. due to ectopic pregnancy. Dr. Eddie Price.     HX TUBAL LIGATION  2002    Dr. Ligia Otero     Social History     Socioeconomic History    Marital status:    Tobacco Use    Smoking status: Never    Smokeless tobacco: Never   Vaping Use    Vaping Use: Never used   Substance and Sexual Activity    Alcohol use: No    Drug use: No    Sexual activity: Yes     Partners: Male     Birth control/protection: Surgical     Comment: TL     Family History   Problem Relation Age of Onset    Heart Disease Mother     Diabetes Mother         type ll    Hypertension Mother     Diabetes Father         type ll    Breast Cancer Sister     Breast Cancer Maternal Aunt      Current Outpatient Medications   Medication Sig Dispense Refill    lisinopriL (PRINIVIL, ZESTRIL) 40 mg tablet Take 40 mg by mouth daily. ferrous sulfate 325 mg (65 mg iron) tablet Take  by mouth Daily (before breakfast). mometasone (ASMANEX TWISTHALER) 220 mcg (30 doses) inhaler Take 1 Puff by inhalation two (2) times a day.  Indications: MAINTENANCE THERAPY FOR ASTHMA 30 Cap 5     Allergies   Allergen Reactions    Bactrim [Sulfamethoxazole-Trimethoprim] Other (comments)     GI Upset     Objective:  Visit Vitals  BP (!) 182/96   Pulse 85   Temp 97 °F (36.1 °C) (Oral)   Resp 20   Ht 5' 4\" (1.626 m)   Wt 180 lb (81.6 kg)   LMP 02/16/2023   SpO2 98%   BMI 30.90 kg/m²     Physical Exam:   General appearance - alert, well appearing in no distress  Mental status - alert, oriented to person, place, and time  EYE-PERRL, EOMI  Neck - supple, no significant adenopathy   Chest - clear to auscultation, no wheezes, rales or rhonchi  Heart - normal rate, regular rhythm, no murmurs   Abdomen - soft, nontender, nondistended, no organomegaly  Ext-peripheral pulses normal, no pedal edema  Neuro -no focal findings   Back-full range of motion, no tenderness, palpable spasm or pain on motion     Results for orders placed or performed during the hospital encounter of 01/21/23   SAMPLES BEING HELD   Result Value Ref Range    SAMPLES BEING HELD 1RED,1BLU     COMMENT        Add-on orders for these samples will be processed based on acceptable specimen integrity and analyte stability, which may vary by analyte. CBC WITH AUTOMATED DIFF   Result Value Ref Range    WBC 11.8 (H) 3.6 - 11.0 K/uL    RBC 4.78 3.80 - 5.20 M/uL    HGB 7.7 (L) 11.5 - 16.0 g/dL    HCT 32.4 (L) 35.0 - 47.0 %    MCV 67.8 (L) 80.0 - 99.0 FL    MCH 16.1 (L) 26.0 - 34.0 PG    MCHC 23.8 (L) 30.0 - 36.5 g/dL    RDW 34.0 (H) 11.5 - 14.5 %    PLATELET 128 712 - 238 K/uL    NRBC 0.0 0  WBC    ABSOLUTE NRBC 0.00 0.00 - 0.01 K/uL    NEUTROPHILS 72 32 - 75 %    LYMPHOCYTES 15 12 - 49 %    MONOCYTES 6 5 - 13 %    EOSINOPHILS 6 0 - 7 %    BASOPHILS 0 0 - 1 %    IMMATURE GRANULOCYTES 1 (H) 0.0 - 0.5 %    ABS. NEUTROPHILS 8.5 (H) 1.8 - 8.0 K/UL    ABS. LYMPHOCYTES 1.8 0.8 - 3.5 K/UL    ABS. MONOCYTES 0.7 0.0 - 1.0 K/UL    ABS. EOSINOPHILS 0.7 (H) 0.0 - 0.4 K/UL    ABS. BASOPHILS 0.0 0.0 - 0.1 K/UL    ABS. IMM. GRANS. 0.1 (H) 0.00 - 0.04 K/UL    DF SMEAR SCANNED      RBC COMMENTS ANISOCYTOSIS  4+        RBC COMMENTS HYPOCHROMIA  4+        RBC COMMENTS MICROCYTOSIS  2+        RBC COMMENTS POIKILOCYTOSIS  PRESENT        RBC COMMENTS OVALOCYTES  PRESENT       METABOLIC PANEL, COMPREHENSIVE   Result Value Ref Range    Sodium 135 (L) 136 - 145 mmol/L    Potassium 4.1 3.5 - 5.1 mmol/L    Chloride 104 97 - 108 mmol/L    CO2 28 21 - 32 mmol/L    Anion gap 3 (L) 5 - 15 mmol/L    Glucose 82 65 - 100 mg/dL    BUN 13 6 - 20 MG/DL    Creatinine 1.03 (H) 0.55 - 1.02 MG/DL    BUN/Creatinine ratio 13 12 - 20      eGFR >60 >60 ml/min/1.73m2    Calcium 9.1 8.5 - 10.1 MG/DL    Bilirubin, total 0.3 0.2 - 1.0 MG/DL    ALT (SGPT) 14 12 - 78 U/L    AST (SGOT) 13 (L) 15 - 37 U/L    Alk. phosphatase 79 45 - 117 U/L    Protein, total 8.0 6.4 - 8.2 g/dL    Albumin 3.5 3.5 - 5.0 g/dL    Globulin 4.5 (H) 2.0 - 4.0 g/dL    A-G Ratio 0.8 (L) 1.1 - 2.2       Assessment/Plan:  Diagnoses and all orders for this visit:    Encounter for medical examination to establish care  -     METABOLIC PANEL, COMPREHENSIVE;  Future  -     CBC WITH AUTOMATED DIFF; Future    Encounter for screening mammogram for breast cancer  -     EMMA MAMMO BI SCREENING INCL CAD; Future    Encounter for colorectal cancer screening  -     REFERRAL TO GASTROENTEROLOGY    Need for hepatitis C screening test  -     HEPATITIS C AB; Future    Hypertension goal BP (blood pressure) < 405/59  -     METABOLIC PANEL, COMPREHENSIVE; Future  -     CBC WITH AUTOMATED DIFF; Future  -     NIFEdipine ER (ADALAT CC) 30 mg ER tablet; Take 1 Tablet by mouth daily. , Normal, Disp-30 Tablet, R-0    Vitamin D deficiency  -     VITAMIN D, 25 HYDROXY; Future    Hypochromic-microcytic anemia  -     CBC WITH AUTOMATED DIFF; Future    Hypercholesterolemia  -     LIPID PANEL; Future    Screening for thyroid disorder  -     TSH 3RD GENERATION; Future    Screening for diabetes mellitus (DM)  -     HEMOGLOBIN A1C WITH EAG; Future    Frequency of urination  -     URINALYSIS W/ RFLX MICROSCOPIC; Future    Mild intermittent asthma without complication    RAD (reactive airway disease) with wheezing, mild intermittent, uncomplicated  -     ProAir HFA 90 mcg/actuation inhaler; Take 2 Puffs by inhalation every four (4) hours as needed for Wheezing., Normal, Disp-1 Each, R-5, JINA      Follow-up and Dispositions    Return 1 weeks, for follow up blood pressure/result.

## 2023-02-16 NOTE — PROGRESS NOTES
1. \"Have you been to the ER, urgent care clinic since your last visit? Hospitalized since your last visit? \"  Was seen at [facility] and [facility]    2. \"Have you seen or consulted any other health care providers outside of the [address] [place] [address] since your last visit? \" No     3. For patients aged 39-70: Has the patient had a colonoscopy / FIT/ Cologuard? No      If the patient is female:    4. For patients aged 41-77: Has the patient had a mammogram within the past 2 years? No      5. For patients aged 21-65: Has the patient had a pap smear?  Yes Patient needs a refill on his medication

## 2023-02-16 NOTE — TELEPHONE ENCOUNTER
[facility] needs generic  medication due to patients insurance       RE: ProAir HFA 90 mcg/actuation inhaler     Best number to reach them is [phone]

## 2023-02-22 ENCOUNTER — OFFICE VISIT (OUTPATIENT)
Dept: FAMILY MEDICINE CLINIC | Age: 48
End: 2023-02-22
Payer: COMMERCIAL

## 2023-02-22 VITALS
WEIGHT: 176 LBS | HEIGHT: 64 IN | BODY MASS INDEX: 30.05 KG/M2 | HEART RATE: 88 BPM | RESPIRATION RATE: 20 BRPM | DIASTOLIC BLOOD PRESSURE: 75 MMHG | TEMPERATURE: 97.6 F | OXYGEN SATURATION: 96 % | SYSTOLIC BLOOD PRESSURE: 136 MMHG

## 2023-02-22 DIAGNOSIS — I10 HYPERTENSION, WELL CONTROLLED: Primary | ICD-10-CM

## 2023-02-22 PROCEDURE — 3078F DIAST BP <80 MM HG: CPT | Performed by: INTERNAL MEDICINE

## 2023-02-22 PROCEDURE — 3075F SYST BP GE 130 - 139MM HG: CPT | Performed by: INTERNAL MEDICINE

## 2023-02-22 PROCEDURE — 99213 OFFICE O/P EST LOW 20 MIN: CPT | Performed by: INTERNAL MEDICINE

## 2023-02-22 RX ORDER — LISINOPRIL 40 MG/1
40 TABLET ORAL DAILY
Qty: 90 TABLET | Refills: 1 | Status: SHIPPED | OUTPATIENT
Start: 2023-02-22

## 2023-02-22 RX ORDER — NIFEDIPINE 30 MG/1
30 TABLET, FILM COATED, EXTENDED RELEASE ORAL DAILY
Qty: 90 TABLET | Refills: 1 | Status: SHIPPED | OUTPATIENT
Start: 2023-02-22

## 2023-02-22 NOTE — PROGRESS NOTES
Chief Complaint   Patient presents with    Results    Hypertension     HPI:  Mary Brown is a 50 y.o. female with significant medical history below presents for results review and and blood pressure check. How ever, lab results are not yet available since she did blood work yesterday. I promised to call her when results are released. During previous visit blood pressure was markedly elevated on 40 mg Lisinopril. Nifedipine was added to regimen. Today's blood pressure reading is stable. Patient says she has no problem with the new medication. Advised to continue with both medications as. Review of Systems  As per hpi    Past Medical History:   Diagnosis Date    Allergy, unspecified not elsewhere classified     Anemia     Ankle sprain 03/2009    bilateral.  Dr. Nirav White childhood    Breast mass 5/5/09    Left. benign. Chickenpox childhood    Ectopic pregnancy 04/2010    Right. Dr. Alan Morocho    Essential hypertension, benign     Menorrhagia 08/2015    Dr. Nathanael Sherman    Ovarian cyst 2011    R. Dr. Alan Morocho    Pure hypercholesterolemia 05/2009    Thrombocytosis 05/2009    Tubal pregnancy 06/2011    Dr Alan Morocho     Vitamin D deficiency 05/2009     Past Surgical History:   Procedure Laterality Date    DILATION AND CURETTAGE      HX DILATION AND CURETTAGE  08/11/2015    Dr. Nathanael Sherman. due to heavy vag bleeding and thick endometrial stripe. HX SALPINGO-OOPHORECTOMY Right 04/2010    Right. due to ectopic pregnancy. Dr. Alan Morocho.     HX TUBAL LIGATION  2002    Dr. Nathanael Sherman     Social History     Socioeconomic History    Marital status:    Tobacco Use    Smoking status: Never    Smokeless tobacco: Never   Vaping Use    Vaping Use: Never used   Substance and Sexual Activity    Alcohol use: No    Drug use: No    Sexual activity: Yes     Partners: Male     Birth control/protection: Surgical     Comment: TL     Family History   Problem Relation Age of Onset    Heart Disease Mother     Diabetes Mother         type ll    Hypertension Mother     Diabetes Father         type ll    Breast Cancer Sister     Breast Cancer Maternal Aunt      Current Outpatient Medications   Medication Sig Dispense Refill    lisinopriL (PRINIVIL, ZESTRIL) 40 mg tablet TAKE TABLET BY MOUTH EVERY DAY 90 Tablet 1    NIFEdipine ER (ADALAT CC) 30 mg ER tablet Take 1 Tablet by mouth daily. 30 Tablet 0    ProAir HFA 90 mcg/actuation inhaler Take 2 Puffs by inhalation every four (4) hours as needed for Wheezing. 1 Each 5    ferrous sulfate 325 mg (65 mg iron) tablet Take  by mouth Daily (before breakfast). mometasone (ASMANEX TWISTHALER) 220 mcg (30 doses) inhaler Take 1 Puff by inhalation two (2) times a day. Indications: MAINTENANCE THERAPY FOR ASTHMA 30 Cap 5     Allergies   Allergen Reactions    Bactrim [Sulfamethoxazole-Trimethoprim] Other (comments)     GI Upset       Objective:  Visit Vitals  /75   Pulse 88   Temp 97.6 °F (36.4 °C) (Temporal)   Resp 20   Ht 5' 4\" (1.626 m)   Wt 176 lb (79.8 kg)   LMP 02/16/2023   SpO2 96%   BMI 30.21 kg/m²     Physical Exam:   General appearance - alert, well appearing in no distress  Mental status - alert, oriented to person, place, and time  Neck - supple, no significant adenopathy   Chest - clear to auscultation, no wheezes, rales or rhonchi   Heart - normal rate, regular rhythm, no murmurs  Abdomen - soft, nontender, nondistended, no organomegaly  Ext-peripheral pulses normal, no pedal edema  Neuro -no focal findings    Results for orders placed or performed during the hospital encounter of 01/21/23   SAMPLES BEING HELD   Result Value Ref Range    SAMPLES BEING HELD 1RED,1BLU     COMMENT        Add-on orders for these samples will be processed based on acceptable specimen integrity and analyte stability, which may vary by analyte.    CBC WITH AUTOMATED DIFF   Result Value Ref Range    WBC 11.8 (H) 3.6 - 11.0 K/uL    RBC 4.78 3.80 - 5.20 M/uL HGB 7.7 (L) 11.5 - 16.0 g/dL    HCT 32.4 (L) 35.0 - 47.0 %    MCV 67.8 (L) 80.0 - 99.0 FL    MCH 16.1 (L) 26.0 - 34.0 PG    MCHC 23.8 (L) 30.0 - 36.5 g/dL    RDW 34.0 (H) 11.5 - 14.5 %    PLATELET 713 312 - 132 K/uL    NRBC 0.0 0  WBC    ABSOLUTE NRBC 0.00 0.00 - 0.01 K/uL    NEUTROPHILS 72 32 - 75 %    LYMPHOCYTES 15 12 - 49 %    MONOCYTES 6 5 - 13 %    EOSINOPHILS 6 0 - 7 %    BASOPHILS 0 0 - 1 %    IMMATURE GRANULOCYTES 1 (H) 0.0 - 0.5 %    ABS. NEUTROPHILS 8.5 (H) 1.8 - 8.0 K/UL    ABS. LYMPHOCYTES 1.8 0.8 - 3.5 K/UL    ABS. MONOCYTES 0.7 0.0 - 1.0 K/UL    ABS. EOSINOPHILS 0.7 (H) 0.0 - 0.4 K/UL    ABS. BASOPHILS 0.0 0.0 - 0.1 K/UL    ABS. IMM. GRANS. 0.1 (H) 0.00 - 0.04 K/UL    DF SMEAR SCANNED      RBC COMMENTS ANISOCYTOSIS  4+        RBC COMMENTS HYPOCHROMIA  4+        RBC COMMENTS MICROCYTOSIS  2+        RBC COMMENTS POIKILOCYTOSIS  PRESENT        RBC COMMENTS OVALOCYTES  PRESENT       METABOLIC PANEL, COMPREHENSIVE   Result Value Ref Range    Sodium 135 (L) 136 - 145 mmol/L    Potassium 4.1 3.5 - 5.1 mmol/L    Chloride 104 97 - 108 mmol/L    CO2 28 21 - 32 mmol/L    Anion gap 3 (L) 5 - 15 mmol/L    Glucose 82 65 - 100 mg/dL    BUN 13 6 - 20 MG/DL    Creatinine 1.03 (H) 0.55 - 1.02 MG/DL    BUN/Creatinine ratio 13 12 - 20      eGFR >60 >60 ml/min/1.73m2    Calcium 9.1 8.5 - 10.1 MG/DL    Bilirubin, total 0.3 0.2 - 1.0 MG/DL    ALT (SGPT) 14 12 - 78 U/L    AST (SGOT) 13 (L) 15 - 37 U/L    Alk. phosphatase 79 45 - 117 U/L    Protein, total 8.0 6.4 - 8.2 g/dL    Albumin 3.5 3.5 - 5.0 g/dL    Globulin 4.5 (H) 2.0 - 4.0 g/dL    A-G Ratio 0.8 (L) 1.1 - 2.2       Assessment/Plan:  Diagnoses and all orders for this visit:    Hypertension, well controlled  -     lisinopriL (PRINIVIL, ZESTRIL) 40 mg tablet; Take 1 Tablet by mouth daily. , Normal, Disp-90 Tablet, R-1  -     NIFEdipine ER (ADALAT CC) 30 mg ER tablet; Take 1 Tablet by mouth daily. , Normal, Disp-90 Tablet, R-1    Patient Instructions   I will call with lab results    Follow-up and Dispositions    Return 3-4 months, for routine follow up.

## 2023-03-15 ENCOUNTER — HOSPITAL ENCOUNTER (OUTPATIENT)
Dept: PREADMISSION TESTING | Age: 48
Discharge: HOME | End: 2023-03-15
Payer: COMMERCIAL

## 2023-03-15 VITALS
WEIGHT: 183.86 LBS | HEIGHT: 62 IN | OXYGEN SATURATION: 95 % | TEMPERATURE: 97.9 F | DIASTOLIC BLOOD PRESSURE: 82 MMHG | SYSTOLIC BLOOD PRESSURE: 165 MMHG | HEART RATE: 96 BPM | BODY MASS INDEX: 33.84 KG/M2

## 2023-03-15 DIAGNOSIS — D21.9 FIBROIDS: ICD-10-CM

## 2023-03-15 LAB
ABO + RH BLD: NORMAL
ATRIAL RATE: 92 BPM
BASOPHILS # BLD: 0 K/UL (ref 0–0.1)
BASOPHILS NFR BLD: 0 % (ref 0–1)
BLOOD GROUP ANTIBODIES SERPL: NORMAL
CALCULATED P AXIS, ECG09: -73 DEGREES
CALCULATED R AXIS, ECG10: 12 DEGREES
CALCULATED T AXIS, ECG11: -173 DEGREES
DIAGNOSIS, 93000: NORMAL
DIFFERENTIAL METHOD BLD: ABNORMAL
EOSINOPHIL # BLD: 0.4 K/UL (ref 0–0.4)
EOSINOPHIL NFR BLD: 6 % (ref 0–7)
ERYTHROCYTE [DISTWIDTH] IN BLOOD BY AUTOMATED COUNT: 20.2 % (ref 11.5–14.5)
HCT VFR BLD AUTO: 40.8 % (ref 35–47)
HGB BLD-MCNC: 11.9 G/DL (ref 11.5–16)
IMM GRANULOCYTES # BLD AUTO: 0 K/UL (ref 0–0.04)
IMM GRANULOCYTES NFR BLD AUTO: 0 % (ref 0–0.5)
LYMPHOCYTES # BLD: 0.9 K/UL (ref 0.8–3.5)
LYMPHOCYTES NFR BLD: 13 % (ref 12–49)
MCH RBC QN AUTO: 24.9 PG (ref 26–34)
MCHC RBC AUTO-ENTMCNC: 29.2 G/DL (ref 30–36.5)
MCV RBC AUTO: 85.5 FL (ref 80–99)
MONOCYTES # BLD: 0.6 K/UL (ref 0–1)
MONOCYTES NFR BLD: 9 % (ref 5–13)
NEUTS SEG # BLD: 5.2 K/UL (ref 1.8–8)
NEUTS SEG NFR BLD: 72 % (ref 32–75)
NRBC # BLD: 0 K/UL (ref 0–0.01)
NRBC BLD-RTO: 0 PER 100 WBC
P-R INTERVAL, ECG05: 150 MS
PLATELET # BLD AUTO: 317 K/UL (ref 150–400)
Q-T INTERVAL, ECG07: 386 MS
QRS DURATION, ECG06: 88 MS
QTC CALCULATION (BEZET), ECG08: 477 MS
RBC # BLD AUTO: 4.77 M/UL (ref 3.8–5.2)
RBC MORPH BLD: ABNORMAL
SPECIMEN EXP DATE BLD: NORMAL
VENTRICULAR RATE, ECG03: 92 BPM
WBC # BLD AUTO: 7.1 K/UL (ref 3.6–11)

## 2023-03-15 PROCEDURE — 85025 COMPLETE CBC W/AUTO DIFF WBC: CPT

## 2023-03-15 PROCEDURE — 86900 BLOOD TYPING SEROLOGIC ABO: CPT

## 2023-03-15 PROCEDURE — 36415 COLL VENOUS BLD VENIPUNCTURE: CPT

## 2023-03-15 PROCEDURE — 93005 ELECTROCARDIOGRAM TRACING: CPT

## 2023-03-15 RX ORDER — ACETAMINOPHEN/PYRILAMINE/CAFF 500-15-60
TABLET ORAL AS NEEDED
COMMUNITY

## 2023-03-15 NOTE — PERIOP NOTES
[address] INSTRUCTIONS    Surgery Date:   3/20/23    Your surgeon's office or [facility] staff will call you between 4 PM- 8 PM the day before surgery with your arrival time. If your surgery is on a Monday, you will receive a call the preceding Friday. Please report to [facility] Patient Access/Admitting on the 1st floor. Bring your insurance card, photo identification, and any copayment ( if applicable). If you are going home the same day of your surgery, you must have a responsible adult to drive you home. You need to have a responsible adult to stay with you the first 24 hours after surgery and you should not drive a car for 24 hours following your surgery. Nothing to eat or drink after midnight the night before surgery. This includes no water, gum, mints, coffee, juice, etc.  Please note special instructions, if applicable, below for medications. Do NOT drink alcohol or smoke 24 hours before surgery. STOP smoking for 14 days prior as it helps with breathing and healing after surgery. If you are being admitted to the hospital, please leave personal belongings/luggage in your car until you have an assigned hospital room number. Please wear comfortable clothes. Wear your glasses instead of contacts. We ask that all money, jewelry and valuables be left at home. Wear no make up, particularly mascara, the day of surgery. All body piercings, rings, and jewelry need to be removed and left at home. Please remove any nail polish or artificial nails from your fingernails. Please wear your hair loose or down. Please no pony-tails, buns, or any metal hair accessories. If you shower the morning of surgery, please do not apply any lotions or powders afterwards. You may wear deodorant, unless having breast surgery. Do not shave any body area within 24 hours of your surgery. Please follow all instructions to avoid any potential surgical cancellation.   Should your physical condition change, (i.e. fever, cold, flu, etc.) please notify your surgeon as soon as possible. It is important to be on time. If a situation occurs where you may be delayed, please call:  [phone] / [phone] on the day of surgery. The Preadmission Testing staff can be reached at [phone]. Special instructions: NONE      Current Outpatient Medications   Medication Sig    acetaminophen-caff-pyrilamine (Midol Max St MenstruaL) 500-60-15 mg tab Take  by mouth as needed. lisinopriL (PRINIVIL, ZESTRIL) 40 mg tablet Take 1 Tablet by mouth daily. NIFEdipine ER (ADALAT CC) 30 mg ER tablet Take 1 Tablet by mouth daily. ProAir HFA 90 mcg/actuation inhaler Take 2 Puffs by inhalation every four (4) hours as needed for Wheezing. ferrous sulfate 325 mg (65 mg iron) tablet Take  by mouth Daily (before breakfast). No current facility-administered medications for this encounter. YOU MUST ONLY TAKE THESE MEDICATIONS THE MORNING OF SURGERY WITH A SIP OF WATER: NIFEDIPINE  MEDICATIONS TO TAKE THE MORNING OF SURGERY ONLY IF NEEDED: INHALER  HOLD these prescription medications BEFORE Surgery: NONE  Ask your surgeon/prescribing physician about when/if to STOP taking these medications: NONE  Stop all vitamins, herbal medicines and Aspirin containing products 7 days prior to surgery. Stop any non-steroidal anti-inflammatory drugs (i.e. Ibuprofen, Naproxen, Advil, Aleve) 3 days before surgery. You may take Tylenol. If you are currently taking Plavix, Coumadin, or any other blood-thinning/anticoagulant medication contact your prescribing physician for instructions. Preventing Infections Before and After - Your Surgery    IMPORTANT INSTRUCTIONS      You play an important role in your health and preparation for surgery. To reduce the germs on your skin you will need to shower with CHG soap (Chorhexidine gluconate 4%) two times before surgery.     CHG soap (Hibiclens, Hex-A-Clens or store brand)  CHG soap will be provided at your Preadmission Testing (PAT) appointment. If you do not have a PAT appointment before surgery, you may arrange to  CHG soap from our office or purchase CHG soap at a pharmacy, grocery or department store. You need to purchase TWO 4 ounce bottles to use for your 2 showers. Steps to follow:  Arthea Bauer your hair with your normal shampoo and your body with regular soap and rinse well to remove shampoo and soap from your skin. Wet a clean washcloth and turn off the shower. Put CHG soap on washcloth and apply to your entire body from the neck down. Do not use on your head, face or private parts(genitals). Do not use CHG soap on open sores, wounds or areas of skin irritation. Wash you body gently for 5 minutes. Do not wash your skin too hard. This soap does not create lather. Pay special attention to your underarms and from your belly button to your feet. Turn the shower back on and rinse well to get CHG soap off your body. Pat your skin dry with a clean, dry towel. Do not apply lotions or moisturizer. Put on clean clothes and sleep on fresh bed sheets and do not allow pets to sleep with you. Shower with CHG soap 2 times before your surgery  The evening before your surgery  The morning of your surgery      Tips to help prevent infections after your surgery:  Protect your surgical wound from germs:  Hand washing is the most important thing you and your caregivers can do to prevent infections. Keep your bandage clean and dry! Do not touch your surgical wound. Use clean, freshly washed towels and washcloths every time you shower; do not share bath linens with others. Until your surgical wound is healed, wear clothing and sleep on bed linens each day that are clean and freshly washed. Do not allow pets to sleep in your bed with you or touch your surgical wound. Do not smoke - smoking delays wound healing. This may be a good time to stop smoking.   If you have diabetes, it is important for you to manage your blood sugar levels properly before your surgery as well as after your surgery. Poorly managed blood sugar levels slow down wound healing and prevent you from healing completely. Patient Information Regarding COVID Restrictions    Day of Procedure    Please park in the parking deck or any designated visitor parking lot. Enter the facility through the Main Entrance of the hospital.  On the day of surgery, please provide the cell phone number of the person who will be waiting for you to the Patient Access representative at the time of registration. Masks are highly recommended in the hospital, but not required. Once your procedure and the immediate recovery period is completed, a nurse in the recovery area will contact your designated visitor to inform them of your room number or to otherwise review other pertinent information regarding your care. Social distancing practices are strongly encouraged in waiting areas and the cafeteria. The patient was contacted  in person. She verbalized understanding of all instructions and does not  need reinforcement.

## 2023-03-16 NOTE — PERIOP NOTES
EKG DONE IN PAT FAXED TO ANESTHESIA PER Purvi Lopez NP.    PAT LABS FAXED TO DR. CRAVEN AND TO PT'S PCP VIA [facility].

## 2023-03-17 ENCOUNTER — ANESTHESIA EVENT (OUTPATIENT)
Dept: SURGERY | Age: 48
End: 2023-03-17
Payer: COMMERCIAL

## 2023-03-20 ENCOUNTER — ANESTHESIA (OUTPATIENT)
Dept: SURGERY | Age: 48
End: 2023-03-20
Payer: COMMERCIAL

## 2023-03-20 ENCOUNTER — HOSPITAL ENCOUNTER (OUTPATIENT)
Age: 48
Setting detail: OUTPATIENT SURGERY
Discharge: HOME | End: 2023-03-20
Attending: OBSTETRICS & GYNECOLOGY | Admitting: OBSTETRICS & GYNECOLOGY
Payer: COMMERCIAL

## 2023-03-20 VITALS
TEMPERATURE: 97.6 F | DIASTOLIC BLOOD PRESSURE: 73 MMHG | HEIGHT: 62 IN | HEART RATE: 73 BPM | RESPIRATION RATE: 17 BRPM | WEIGHT: 183.86 LBS | OXYGEN SATURATION: 98 % | BODY MASS INDEX: 33.84 KG/M2 | SYSTOLIC BLOOD PRESSURE: 167 MMHG

## 2023-03-20 DIAGNOSIS — D21.9 FIBROIDS: ICD-10-CM

## 2023-03-20 LAB — HCG UR QL: NEGATIVE

## 2023-03-20 PROCEDURE — 76010000138 HC OR TIME 0.5 TO 1 HR: Performed by: OBSTETRICS & GYNECOLOGY

## 2023-03-20 PROCEDURE — 77030003666 HC NDL SPINAL BD -A: Performed by: OBSTETRICS & GYNECOLOGY

## 2023-03-20 PROCEDURE — 74011250637 HC RX REV CODE- 250/637: Performed by: STUDENT IN AN ORGANIZED HEALTH CARE EDUCATION/TRAINING PROGRAM

## 2023-03-20 PROCEDURE — 76060000032 HC ANESTHESIA 0.5 TO 1 HR: Performed by: OBSTETRICS & GYNECOLOGY

## 2023-03-20 PROCEDURE — 81025 URINE PREGNANCY TEST: CPT

## 2023-03-20 PROCEDURE — 74011000250 HC RX REV CODE- 250: Performed by: OBSTETRICS & GYNECOLOGY

## 2023-03-20 PROCEDURE — 76210000021 HC REC RM PH II 0.5 TO 1 HR: Performed by: OBSTETRICS & GYNECOLOGY

## 2023-03-20 PROCEDURE — 77030040922 HC BLNKT HYPOTHRM STRY -A

## 2023-03-20 PROCEDURE — 77030041423 HC SYST FLUID MNGMT FLUENT HOLO -D: Performed by: OBSTETRICS & GYNECOLOGY

## 2023-03-20 PROCEDURE — 74011250636 HC RX REV CODE- 250/636: Performed by: NURSE ANESTHETIST, CERTIFIED REGISTERED

## 2023-03-20 PROCEDURE — 74011250636 HC RX REV CODE- 250/636: Performed by: STUDENT IN AN ORGANIZED HEALTH CARE EDUCATION/TRAINING PROGRAM

## 2023-03-20 PROCEDURE — 74011000250 HC RX REV CODE- 250: Performed by: NURSE ANESTHETIST, CERTIFIED REGISTERED

## 2023-03-20 PROCEDURE — 77030026236 HC DEV TISS RMVL MYOSUR HOLO -G1: Performed by: OBSTETRICS & GYNECOLOGY

## 2023-03-20 PROCEDURE — 2709999900 HC NON-CHARGEABLE SUPPLY: Performed by: OBSTETRICS & GYNECOLOGY

## 2023-03-20 PROCEDURE — 77030010509 HC AIRWY LMA MSK TELE -A: Performed by: NURSE ANESTHETIST, CERTIFIED REGISTERED

## 2023-03-20 PROCEDURE — 76210000016 HC OR PH I REC 1 TO 1.5 HR: Performed by: OBSTETRICS & GYNECOLOGY

## 2023-03-20 PROCEDURE — 77030019905 HC CATH URETH INTMIT MDII -A: Performed by: OBSTETRICS & GYNECOLOGY

## 2023-03-20 PROCEDURE — 88305 TISSUE EXAM BY PATHOLOGIST: CPT

## 2023-03-20 RX ORDER — FENTANYL CITRATE 50 UG/ML
INJECTION, SOLUTION INTRAMUSCULAR; INTRAVENOUS AS NEEDED
Status: DISCONTINUED | OUTPATIENT
Start: 2023-03-20 | End: 2023-03-20 | Stop reason: HOSPADM

## 2023-03-20 RX ORDER — ACETAMINOPHEN 325 MG/1
650 TABLET ORAL ONCE
Status: COMPLETED | OUTPATIENT
Start: 2023-03-20 | End: 2023-03-20

## 2023-03-20 RX ORDER — SODIUM CHLORIDE 0.9 % (FLUSH) 0.9 %
5-40 SYRINGE (ML) INJECTION EVERY 8 HOURS
Status: DISCONTINUED | OUTPATIENT
Start: 2023-03-20 | End: 2023-03-20 | Stop reason: HOSPADM

## 2023-03-20 RX ORDER — HYDROMORPHONE HYDROCHLORIDE 1 MG/ML
0.2 INJECTION, SOLUTION INTRAMUSCULAR; INTRAVENOUS; SUBCUTANEOUS
Status: DISCONTINUED | OUTPATIENT
Start: 2023-03-20 | End: 2023-03-20 | Stop reason: HOSPADM

## 2023-03-20 RX ORDER — ONDANSETRON 2 MG/ML
4 INJECTION INTRAMUSCULAR; INTRAVENOUS AS NEEDED
Status: DISCONTINUED | OUTPATIENT
Start: 2023-03-20 | End: 2023-03-20 | Stop reason: HOSPADM

## 2023-03-20 RX ORDER — LIDOCAINE HYDROCHLORIDE 10 MG/ML
0.1 INJECTION, SOLUTION EPIDURAL; INFILTRATION; INTRACAUDAL; PERINEURAL AS NEEDED
Status: DISCONTINUED | OUTPATIENT
Start: 2023-03-20 | End: 2023-03-20 | Stop reason: HOSPADM

## 2023-03-20 RX ORDER — PROPOFOL 10 MG/ML
INJECTION, EMULSION INTRAVENOUS AS NEEDED
Status: DISCONTINUED | OUTPATIENT
Start: 2023-03-20 | End: 2023-03-20 | Stop reason: HOSPADM

## 2023-03-20 RX ORDER — IBUPROFEN 800 MG/1
800 TABLET ORAL
Qty: 100 TABLET | Refills: 0 | Status: SHIPPED | OUTPATIENT
Start: 2023-03-20

## 2023-03-20 RX ORDER — FENTANYL CITRATE 50 UG/ML
25 INJECTION, SOLUTION INTRAMUSCULAR; INTRAVENOUS
Status: DISCONTINUED | OUTPATIENT
Start: 2023-03-20 | End: 2023-03-20 | Stop reason: HOSPADM

## 2023-03-20 RX ORDER — LIDOCAINE HYDROCHLORIDE 20 MG/ML
INJECTION, SOLUTION EPIDURAL; INFILTRATION; INTRACAUDAL; PERINEURAL AS NEEDED
Status: DISCONTINUED | OUTPATIENT
Start: 2023-03-20 | End: 2023-03-20 | Stop reason: HOSPADM

## 2023-03-20 RX ORDER — SODIUM CHLORIDE, SODIUM LACTATE, POTASSIUM CHLORIDE, CALCIUM CHLORIDE 600; 310; 30; 20 MG/100ML; MG/100ML; MG/100ML; MG/100ML
50 INJECTION, SOLUTION INTRAVENOUS CONTINUOUS
Status: DISCONTINUED | OUTPATIENT
Start: 2023-03-20 | End: 2023-03-20 | Stop reason: HOSPADM

## 2023-03-20 RX ORDER — DEXAMETHASONE SODIUM PHOSPHATE 4 MG/ML
INJECTION, SOLUTION INTRA-ARTICULAR; INTRALESIONAL; INTRAMUSCULAR; INTRAVENOUS; SOFT TISSUE AS NEEDED
Status: DISCONTINUED | OUTPATIENT
Start: 2023-03-20 | End: 2023-03-20 | Stop reason: HOSPADM

## 2023-03-20 RX ORDER — ACETAMINOPHEN 500 MG
1000 TABLET ORAL
Qty: 100 TABLET | Refills: 0 | Status: SHIPPED | OUTPATIENT
Start: 2023-03-20

## 2023-03-20 RX ORDER — SODIUM CHLORIDE 0.9 % (FLUSH) 0.9 %
5-40 SYRINGE (ML) INJECTION AS NEEDED
Status: DISCONTINUED | OUTPATIENT
Start: 2023-03-20 | End: 2023-03-20 | Stop reason: HOSPADM

## 2023-03-20 RX ORDER — KETOROLAC TROMETHAMINE 30 MG/ML
INJECTION, SOLUTION INTRAMUSCULAR; INTRAVENOUS AS NEEDED
Status: DISCONTINUED | OUTPATIENT
Start: 2023-03-20 | End: 2023-03-20 | Stop reason: HOSPADM

## 2023-03-20 RX ORDER — ONDANSETRON 2 MG/ML
INJECTION INTRAMUSCULAR; INTRAVENOUS AS NEEDED
Status: DISCONTINUED | OUTPATIENT
Start: 2023-03-20 | End: 2023-03-20 | Stop reason: HOSPADM

## 2023-03-20 RX ORDER — BUPIVACAINE HYDROCHLORIDE 5 MG/ML
INJECTION, SOLUTION EPIDURAL; INTRACAUDAL AS NEEDED
Status: DISCONTINUED | OUTPATIENT
Start: 2023-03-20 | End: 2023-03-20 | Stop reason: HOSPADM

## 2023-03-20 RX ORDER — MIDAZOLAM HYDROCHLORIDE 1 MG/ML
INJECTION, SOLUTION INTRAMUSCULAR; INTRAVENOUS AS NEEDED
Status: DISCONTINUED | OUTPATIENT
Start: 2023-03-20 | End: 2023-03-20 | Stop reason: HOSPADM

## 2023-03-20 RX ADMIN — FENTANYL CITRATE 75 MCG: 50 INJECTION, SOLUTION INTRAMUSCULAR; INTRAVENOUS at 07:45

## 2023-03-20 RX ADMIN — FENTANYL CITRATE 25 MCG: 50 INJECTION, SOLUTION INTRAMUSCULAR; INTRAVENOUS at 09:16

## 2023-03-20 RX ADMIN — KETOROLAC TROMETHAMINE 30 MG: 30 INJECTION, SOLUTION INTRAMUSCULAR; INTRAVENOUS at 08:04

## 2023-03-20 RX ADMIN — DEXAMETHASONE SODIUM PHOSPHATE 8 MG: 4 INJECTION, SOLUTION INTRAMUSCULAR; INTRAVENOUS at 07:40

## 2023-03-20 RX ADMIN — LIDOCAINE HYDROCHLORIDE 80 MG: 20 INJECTION, SOLUTION EPIDURAL; INFILTRATION; INTRACAUDAL; PERINEURAL at 07:35

## 2023-03-20 RX ADMIN — MIDAZOLAM 2 MG: 1 INJECTION INTRAMUSCULAR; INTRAVENOUS at 07:30

## 2023-03-20 RX ADMIN — ONDANSETRON 4 MG: 2 INJECTION INTRAMUSCULAR; INTRAVENOUS at 09:46

## 2023-03-20 RX ADMIN — FENTANYL CITRATE 25 MCG: 50 INJECTION, SOLUTION INTRAMUSCULAR; INTRAVENOUS at 09:11

## 2023-03-20 RX ADMIN — PROPOFOL 200 MG: 10 INJECTION, EMULSION INTRAVENOUS at 07:35

## 2023-03-20 RX ADMIN — ACETAMINOPHEN 650 MG: 325 TABLET ORAL at 06:32

## 2023-03-20 RX ADMIN — FENTANYL CITRATE 25 MCG: 50 INJECTION, SOLUTION INTRAMUSCULAR; INTRAVENOUS at 07:30

## 2023-03-20 RX ADMIN — ONDANSETRON HYDROCHLORIDE 4 MG: 2 INJECTION, SOLUTION INTRAMUSCULAR; INTRAVENOUS at 08:04

## 2023-03-20 RX ADMIN — SODIUM CHLORIDE, POTASSIUM CHLORIDE, SODIUM LACTATE AND CALCIUM CHLORIDE 50 ML/HR: 600; 310; 30; 20 INJECTION, SOLUTION INTRAVENOUS at 06:41

## 2023-03-20 NOTE — ANESTHESIA POSTPROCEDURE EVALUATION
Post-Anesthesia Evaluation and Assessment    Patient: Xenia Mcguire MRN: [id number]  SSN: [id number]    YOB: 1975  Age: 50 y.o. Sex: female      I have evaluated the patient and they are stable and ready for discharge from the PACU. Cardiovascular Function/Vital Signs  Visit Vitals  BP (!) 160/74   Pulse 63   Temp 36.4 °C (97.6 °F)   Resp 14   Ht 5' 2\" (1.575 m)   Wt 83.4 kg (183 lb 13.8 oz)   SpO2 98%   BMI 33.63 kg/m²       Patient is status post General anesthesia for Procedure(s): HYSTEROSCOPY DILATION AND CURETTAGE WITH MYOSURE. Nausea/Vomiting: None    Postoperative hydration reviewed and adequate. Pain:  Pain Scale 1: Numeric (0 - 10) (03/20/23 0917)  Pain Intensity 1: 6 (03/20/23 0917)   Managed    Neurological Status:   Neuro (WDL): Within Defined Limits (03/20/23 0913)  Neuro  Neurologic State: Alert (03/20/23 0913)  LUE Motor Response: Purposeful (03/20/23 0822)  LLE Motor Response: Purposeful (03/20/23 0822)  RUE Motor Response: Purposeful (03/20/23 3263)  RLE Motor Response: Purposeful (03/20/23 0379)   At baseline    Mental Status, Level of Consciousness: Alert and  oriented to person, place, and time    Pulmonary Status:   O2 Device: None (03/20/23 0913)   Adequate oxygenation and airway patent    Complications related to anesthesia: None    Post-anesthesia assessment completed. No concerns    Signed By: Chava Marshall DO     March 20, 2023                Procedure(s):   HYSTEROSCOPY DILATION AND CURETTAGE WITH MYOSURE.    general    <BSHSIANPOST>

## 2023-03-20 NOTE — H&P
Gynecology History and Physical    Name: Jaleesa Gaxiola MRN: [id number] SSN: [id number]    YOB: 1975  Age: 50 y.o. Sex: female       Subjective:      Chief complaint:  Abnormal uterine bleeding    Charna Dakins is a 50 y.o.  female with a history of abnormal uterine bleeding and anemia. Previous workup included Ultrasound which revealed fibroid(s). Previous treatment measures included observation and failed in office EMBx. She is admitted for Procedure(s) (LRB):  HYSTEROSCOPY DILATION AND CURETTAGE WITH MYOSURE (N/A). The current method of family planning is BTL. OB History    No obstetric history on file. Past Medical History:   Diagnosis Date    Allergy, unspecified not elsewhere classified     Anemia     Ankle sprain 03/2009    bilateral.  Dr. Rosa Jaimes childhood    Breast mass 5/5/09    Left. benign. Chickenpox childhood    Ectopic pregnancy 04/2010    Right. Dr. Marva Roach    Essential hypertension, benign     Menorrhagia 08/2015    Dr. Neo Damian    Ovarian cyst 2011    R. Dr. Marva Roach    Pure hypercholesterolemia 05/2009    Thrombocytosis 05/2009    Tubal pregnancy 06/2011    Dr Marva Roach     Vitamin D deficiency 05/2009     Past Surgical History:   Procedure Laterality Date    DILATION AND CURETTAGE      HX DILATION AND CURETTAGE  08/11/2015    Dr. Neo Damian. due to heavy vag bleeding and thick endometrial stripe. HX SALPINGO-OOPHORECTOMY Right 04/2010    Right. due to ectopic pregnancy. Dr. Marva Roach.     HX TUBAL LIGATION  2002    Dr. Bennett Right History     Occupational History    Not on file   Tobacco Use    Smoking status: Never    Smokeless tobacco: Never   Vaping Use    Vaping Use: Never used   Substance and Sexual Activity    Alcohol use: No    Drug use: No    Sexual activity: Yes     Partners: Male     Birth control/protection: Surgical     Comment: TL     Family History   Problem Relation Age of Onset    Diabetes Father         type ll    Breast Cancer Sister     Breast Cancer Maternal Aunt     Anesth Problems Neg Hx         Allergies   Allergen Reactions    Bactrim [Sulfamethoxazole-Trimethoprim] Other (comments)     GI Upset     Prior to Admission medications    Medication Sig Start Date End Date Taking? Authorizing Provider   lisinopriL (PRINIVIL, ZESTRIL) 40 mg tablet Take 1 Tablet by mouth daily. 2/22/23  Yes Mckenna Wise MD   NIFEdipine ER (ADALAT CC) 30 mg ER tablet Take 1 Tablet by mouth daily. 2/22/23  Yes Mckenna Wise MD   ProAir HFA 90 mcg/actuation inhaler Take 2 Puffs by inhalation every four (4) hours as needed for Wheezing. 2/16/23  Yes Mckenna Wise MD   ferrous sulfate 325 mg (65 mg iron) tablet Take  by mouth Daily (before breakfast). Yes Provider, Historical   acetaminophen-caff-pyrilamine (Midol Max St MenstruaL) 500-60-15 mg tab Take  by mouth as needed. Provider, Historical        Review of Systems:  A comprehensive review of systems was negative except for that written in the History of Present Illness. Objective:     Vitals:    03/20/23 0612   BP: (!) 176/78   Pulse: 82   Resp: 16   Temp: 98.5 °F (36.9 °C)   SpO2: 100%   Weight: 183 lb 13.8 oz (83.4 kg)   Height: 5' 2\" (1.575 m)       Physical Exam:  Patient without distress. Abdom: soft, non-tender, non-distended  Extrem: warm, dry, non-tender    Assessment:   Kelly Card is a 50 y.o. AUB  -elevated risk for hyperplasia given obesity  -failed in office EMBx  Anemia  Enlarged fibroid uterus       Plan:     Procedure(s) (LRB):  HYSTEROSCOPY DILATION AND CURETTAGE WITH MYOSURE (N/A)  Discussed the risks of surgery including the risks of bleeding, infection, deep vein thrombosis, and surgical injuries to internal organs including but not limited to the bowels, bladder, rectum, and female reproductive organs. The patient understands the risks; any and all questions were answered to the patient's satisfaction.     Glenys Guardado Dena Bernal MD, [address] [facility]

## 2023-03-20 NOTE — BRIEF OP NOTE
Brief Postoperative Note    Patient: Luis A Bolivar  YOB: 1975  MRN: [id number]    Date of Procedure: 3/20/2023     Pre-Op Diagnosis:   1) AUB  2) Anemia  3) Enlarged fibroid uterus     Post-Op Diagnosis: Same as preoperative diagnosis and THICKENED ENDOMETRIUM     Procedure(s): HYSTEROSCOPY DILATION AND CURETTAGE WITH MYOSURE    Surgeon(s):  Tim Chavez MD    Surgical Assistant: None    Anesthesia: General     Estimated Blood Loss (mL): ~94     Complications: None    Specimens:   ID Type Source Tests Collected by Time Destination   1 : endometrial curettings Fresh Endometrial Curetting  Tim Chavez MD 3/20/2023 0803 Pathology        Implants: NONE      Drains: STRAIGHT CATH FOR 50 CC'S AT THE COMPLETION OF THE CASE     Findings: SLIGHTLY ENLARGED ANTEVERTED UTERUS SOUNDING TO 10 CM. THICKENED ENDOMETRIAL TISSUE. NO FIBROIDS. FLUID DEFICIT 1000 CC'S.     Electronically Signed by Thuy Ortiz MD on 3/20/2023 at 8:13 AM

## 2023-03-20 NOTE — ANESTHESIA PREPROCEDURE EVALUATION
Relevant Problems   No relevant active problems       Anesthetic History   No history of anesthetic complications            Review of Systems / Medical History  Patient summary reviewed, nursing notes reviewed and pertinent labs reviewed    Pulmonary  Within defined limits                 Neuro/Psych   Within defined limits           Cardiovascular    Hypertension              Exercise tolerance: >4 METS     GI/Hepatic/Renal  Within defined limits              Endo/Other  Within defined limits           Other Findings              Physical Exam    Airway  Mallampati: II  TM Distance: 4 - 6 cm  Neck ROM: normal range of motion   Mouth opening: Normal     Cardiovascular    Rhythm: regular  Rate: normal         Dental  No notable dental hx       Pulmonary  Breath sounds clear to auscultation               Abdominal  GI exam deferred       Other Findings            Anesthetic Plan    ASA: 2  Anesthesia type: general          Induction: Intravenous  Anesthetic plan and risks discussed with: Patient

## 2023-03-20 NOTE — DISCHARGE INSTRUCTIONS
You had a motrin-like medicine through your IV at 8 am. NO motrin for 6 hours. Hysteroscopy: What to Expect at [facility] 1808 hysteroscopy is a procedure to find and treat problems with your uterus. It may have been done to remove growths from the uterus. Or it may have been done to diagnose or treat fertility problems. It can also be used to diagnose or treat abnormal bleeding. You may have cramps, discharge, or light vaginal bleeding for several days after the test. This may last longer if the hysteroscopy was used for treatment. If the doctor filled your uterus with air, your belly may feel full. You may also have shoulder pain right after the procedure. This care sheet gives you a general idea about how long it will take for you to recover. But each person recovers at a different pace. Follow the steps below to get better as quickly as possible. How can you care for yourself at home? Activity    Rest when you feel tired. You can do your normal activities when it feels okay to do so. You may shower and take baths as usual.     Ask your doctor when it is okay for you to have sex. Diet    You can eat your normal diet. If your stomach is upset, try bland, low-fat foods like plain rice, broiled chicken, toast, and yogurt. If your bowel movements are not regular right after surgery, try to avoid constipation and straining. Drink plenty of water. Your doctor may suggest fiber, a stool softener, or a mild laxative. Medicines    Your doctor will tell you if and when you can restart your medicines. You will also be given instructions about taking any new medicines. If you take aspirin or some other blood thinner, be sure to talk to your doctor. Your doctor will tell you if and when to start taking this medicine again. Make sure that you understand exactly what your doctor wants you to do. Be safe with medicines. Take pain medicines exactly as directed.   If the doctor gave you a prescription medicine for pain, take it as prescribed. If you are not taking a prescription pain medicine, ask your doctor if you can take an over-the-counter medicine. Do not take two or more pain medicines at the same time unless the doctor told you to. Many pain medicines have acetaminophen, which is Tylenol. Too much acetaminophen (Tylenol) can be harmful. If your doctor prescribed antibiotics, take them as directed. Do not stop taking them just because you feel better. You need to take the full course of antibiotics. Other instructions    You may have some light vaginal bleeding. Wear sanitary pads if needed. Do not use tampons until your doctor says it is okay. You may want to use a heating pad on your belly to help with pain. Use a low heat setting. Talk to your doctor if you want to try to get pregnant soon. They can tell you when it's safe to do so. If you don't want to get pregnant, talk with your doctor about birth control. Follow-up care is a key part of your treatment and safety. Be sure to make and go to all appointments, and call your doctor if you are having problems. It's also a good idea to know your test results and keep a list of the medicines you take. When should you call for help? Call 911 anytime you think you may need emergency care. For example, call if:    You passed out (lost consciousness). You have chest pain, are short of breath, or cough up blood. Call your doctor now or seek immediate medical care if:    You have pain that does not get better after you take pain medicine. You cannot pass stools or gas. You have vaginal discharge that has increased in amount or smells bad. You are sick to your stomach or cannot drink fluids. You have signs of infection, such as: Increased pain, swelling, warmth, or redness. A fever. You have bright red vaginal bleeding that soaks one or more pads in an hour, or you have large clots.      You have signs of a blood clot in your leg (called a deep vein thrombosis), such as:  Pain in your calf, back of the knee, thigh, or groin. Redness and swelling in your leg. Watch closely for changes in your health, and be sure to contact your doctor if you have any problems. Current as of: November 22, 2021               Content Version: 13.4  © 2006-2022 [employer]. Care instructions adapted under license by [employer] (which disclaims liability or warranty for this information). If you have questions about a medical condition or this instruction, always ask your healthcare professional. [place] [address] any warranty or liability for your use of this information. ______________________________________________________________________    Anesthesia Discharge Instructions    After general anesthesia or intervenous sedation, for 24 hours or while taking prescription Narcotics:  Limit your activities  Do not drive or operate hazardous machinery  If you have not urinated within 8 hours after discharge, please contact your surgeon on call. Do not make important personal or business decisions  Do not drink alcoholic beverages    Report the following to your surgeon:  Excessive pain, swelling, redness or odor of or around the surgical area  Temperature over 100.5 degrees  Nausea and vomiting lasting longer than 4 hours or if unable to take medication  Any signs of decreased circulation or nerve impairment to extremity:  Change in color, persistent numbness, tingling, coldness or increased pain.   Any questions

## 2023-03-20 NOTE — OP NOTES
[address]   OPERATIVE REPORT    Name:  Tom Art  MR#:  [id number]  :  1975  ACCOUNT #:  [de-identified]  DATE OF SERVICE:  2023    PREOPERATIVE DIAGNOSES:  1. Abnormal uterine bleeding. 2.  Anemia. 3.  Enlarged fibroid uterus. POSTOPERATIVE DIAGNOSES:  1. Abnormal uterine bleeding. 2.  Anemia. 3.  Enlarged fibroid uterus. 4.  Thickened endometrium. PROCEDURE PERFORMED:  Hysteroscopy, D and C with MyoSure. SURGEON:  Alberto Thomas MD    ASSISTANT:  None. ANESTHESIA:  General.    COMPLICATIONS:  None. SPECIMENS REMOVED:  Endometrial curettings. IMPLANTS:  None. ESTIMATED BLOOD LOSS:  50 mL. DRAINS:  Straight catheter 50 mL at the end of the procedure. FLUID DEFICIT:  1000 mL. FINDINGS:  Slightly enlarged anteverted uterus sounding to 10 cm, thickened endometrial tissue. No fibroids. PROCEDURE:  After informed consent was obtained, the patient was taken to the operating room and placed under satisfactory anesthesia. She was placed in the dorsal lithotomy position with her legs in stirrups. She was prepped and draped in normal sterile fashion. Time-out was performed to ensure correct patient and correct procedure. A speculum was placed into the vagina. The anterior lip of the cervix was grasped with a single-tooth tenaculum. The cervix was serially dilated to accommodate the MyoSure hysteroscope. The MyoSure hysteroscope was advanced to the fundus under direct visualization. Thickened endometrial tissue noted. Both ostia were visualized. No fibroids noted. MyoSure was used to sample all four quadrants of the endometrial cavity after performing a cervical block with 6 mL of 0.25% Marcaine plain. All instruments were then removed from the uterus. Tenaculum was removed from the anterior lip of the cervix. Minimal bleeding was noted. Speculum was removed from vagina.   Bladder was drained of approximately 50 mL of light yellow urine.  The patient was awoken from anesthesia and returned to recovery room in stable condition. All sponge, needle, and instrument counts were correct at the end of the procedure per OR staff.       Brian Gilmore MD      [id number]  D:  03/20/2023 8:29  T:  03/20/2023 13:38  JOB #:  [id number]

## 2023-04-04 ENCOUNTER — TELEPHONE (OUTPATIENT)
Dept: OBGYN CLINIC | Age: 48
End: 2023-04-04

## 2023-04-04 NOTE — TELEPHONE ENCOUNTER
Patient calling name and  verified. Patient calling ss patient . She had procedure on 2023 hysteroscopy d&c with Erick Sure. She is having her period she is bleeding heavy bleeding precautions given. She states she is changing pad every 10-15 minutes and passing clots size of a quarter. Patient advised she needs to go to er.          Please advise further recommendations Thank you

## 2023-05-24 ENCOUNTER — OFFICE VISIT (OUTPATIENT)
Age: 48
End: 2023-05-24
Payer: COMMERCIAL

## 2023-05-24 VITALS
DIASTOLIC BLOOD PRESSURE: 72 MMHG | HEIGHT: 62 IN | SYSTOLIC BLOOD PRESSURE: 148 MMHG | RESPIRATION RATE: 20 BRPM | BODY MASS INDEX: 35.15 KG/M2 | TEMPERATURE: 98.7 F | OXYGEN SATURATION: 100 % | HEART RATE: 78 BPM | WEIGHT: 191 LBS

## 2023-05-24 DIAGNOSIS — E55.9 VITAMIN D DEFICIENCY, UNSPECIFIED: ICD-10-CM

## 2023-05-24 DIAGNOSIS — Z12.31 ENCOUNTER FOR SCREENING MAMMOGRAM FOR BREAST CANCER: ICD-10-CM

## 2023-05-24 DIAGNOSIS — I10 HYPERTENSION GOAL BP (BLOOD PRESSURE) < 130/80: ICD-10-CM

## 2023-05-24 DIAGNOSIS — Z11.4 SCREENING FOR HIV (HUMAN IMMUNODEFICIENCY VIRUS): ICD-10-CM

## 2023-05-24 DIAGNOSIS — E78.00 HYPERCHOLESTEROLEMIA: ICD-10-CM

## 2023-05-24 DIAGNOSIS — D50.9 IRON DEFICIENCY ANEMIA, UNSPECIFIED IRON DEFICIENCY ANEMIA TYPE: ICD-10-CM

## 2023-05-24 DIAGNOSIS — I10 HYPERTENSION GOAL BP (BLOOD PRESSURE) < 130/80: Primary | ICD-10-CM

## 2023-05-24 PROCEDURE — 3074F SYST BP LT 130 MM HG: CPT | Performed by: INTERNAL MEDICINE

## 2023-05-24 PROCEDURE — 3078F DIAST BP <80 MM HG: CPT | Performed by: INTERNAL MEDICINE

## 2023-05-24 PROCEDURE — 99214 OFFICE O/P EST MOD 30 MIN: CPT | Performed by: INTERNAL MEDICINE

## 2023-05-24 PROCEDURE — 1036F TOBACCO NON-USER: CPT | Performed by: INTERNAL MEDICINE

## 2023-05-24 PROCEDURE — G8427 DOCREV CUR MEDS BY ELIG CLIN: HCPCS | Performed by: INTERNAL MEDICINE

## 2023-05-24 PROCEDURE — G8417 CALC BMI ABV UP PARAM F/U: HCPCS | Performed by: INTERNAL MEDICINE

## 2023-05-24 RX ORDER — ACETAMINOPHEN 160 MG
1 TABLET,DISINTEGRATING ORAL DAILY
Qty: 90 CAPSULE | Refills: 1 | Status: SHIPPED | OUTPATIENT
Start: 2023-05-24

## 2023-05-24 SDOH — ECONOMIC STABILITY: TRANSPORTATION INSECURITY
IN THE PAST 12 MONTHS, HAS LACK OF TRANSPORTATION KEPT YOU FROM MEETINGS, WORK, OR FROM GETTING THINGS NEEDED FOR DAILY LIVING?: NO

## 2023-05-24 SDOH — ECONOMIC STABILITY: HOUSING INSECURITY
IN THE LAST 12 MONTHS, WAS THERE A TIME WHEN YOU DID NOT HAVE A STEADY PLACE TO SLEEP OR SLEPT IN A SHELTER (INCLUDING NOW)?: NO

## 2023-05-24 SDOH — ECONOMIC STABILITY: FOOD INSECURITY: WITHIN THE PAST 12 MONTHS, THE FOOD YOU BOUGHT JUST DIDN'T LAST AND YOU DIDN'T HAVE MONEY TO GET MORE.: NEVER TRUE

## 2023-05-24 SDOH — ECONOMIC STABILITY: INCOME INSECURITY: HOW HARD IS IT FOR YOU TO PAY FOR THE VERY BASICS LIKE FOOD, HOUSING, MEDICAL CARE, AND HEATING?: NOT HARD AT ALL

## 2023-05-24 SDOH — ECONOMIC STABILITY: FOOD INSECURITY: WITHIN THE PAST 12 MONTHS, YOU WORRIED THAT YOUR FOOD WOULD RUN OUT BEFORE YOU GOT MONEY TO BUY MORE.: NEVER TRUE

## 2023-05-24 ASSESSMENT — ANXIETY QUESTIONNAIRES
2. NOT BEING ABLE TO STOP OR CONTROL WORRYING: 0
6. BECOMING EASILY ANNOYED OR IRRITABLE: 0
GAD7 TOTAL SCORE: 0
3. WORRYING TOO MUCH ABOUT DIFFERENT THINGS: 0
IF YOU CHECKED OFF ANY PROBLEMS ON THIS QUESTIONNAIRE, HOW DIFFICULT HAVE THESE PROBLEMS MADE IT FOR YOU TO DO YOUR WORK, TAKE CARE OF THINGS AT HOME, OR GET ALONG WITH OTHER PEOPLE: NOT DIFFICULT AT ALL
5. BEING SO RESTLESS THAT IT IS HARD TO SIT STILL: 0
4. TROUBLE RELAXING: 0
7. FEELING AFRAID AS IF SOMETHING AWFUL MIGHT HAPPEN: 0
1. FEELING NERVOUS, ANXIOUS, OR ON EDGE: 0

## 2023-05-24 ASSESSMENT — PATIENT HEALTH QUESTIONNAIRE - PHQ9
SUM OF ALL RESPONSES TO PHQ QUESTIONS 1-9: 0
SUM OF ALL RESPONSES TO PHQ QUESTIONS 1-9: 0
1. LITTLE INTEREST OR PLEASURE IN DOING THINGS: 0
SUM OF ALL RESPONSES TO PHQ QUESTIONS 1-9: 0
SUM OF ALL RESPONSES TO PHQ9 QUESTIONS 1 & 2: 0
2. FEELING DOWN, DEPRESSED OR HOPELESS: 0
SUM OF ALL RESPONSES TO PHQ QUESTIONS 1-9: 0

## 2023-05-24 NOTE — PROGRESS NOTES
Chief Complaint   Patient presents with    Follow-up     3 month routine check up     Fatigue     HPI:  Amrit Craig is a 50 y.o. AA female with hypertension, hypercholesterolemia, anemia, vit D def presents for 3 month follow-up. Blood pressure is elevated on Nifedipine and Lisinopril. Reports she took medications this morning. Patient has c/o fatigue. Denies headaches, chest pain, sob, leg swelling. Review of Systems  As per hpi    Past Medical History:   Diagnosis Date    Allergy, unspecified not elsewhere classified     Anemia     Ankle sprain 03/2009    bilateral.  Dr. Michael Honeycutt childhood    Breast mass 5/5/09    Left. benign. Chickenpox childhood    Ectopic pregnancy 04/2010    Right. Dr. Kerry Lama    Essential hypertension, benign     Menorrhagia 08/2015    Dr. Bailey Stinson    Ovarian cyst 2011    R. Dr. Kerry Lama    Pure hypercholesterolemia 05/2009    Thrombocytosis 05/2009    Tubal pregnancy 06/2011    Dr Kerry Lama     Vitamin D deficiency 05/2009     Past Surgical History:   Procedure Laterality Date    DILATION AND CURETTAGE      DILATION AND CURETTAGE OF UTERUS  08/11/2015    Dr. Bailey Stinson. due to heavy vag bleeding and thick endometrial stripe. SALPINGO-OOPHORECTOMY Right 04/2010    Right. due to ectopic pregnancy. Dr. Krery Lama.     TUBAL LIGATION  2002    Dr. Bailey Stinson     Social History     Socioeconomic History    Marital status:      Spouse name: None    Number of children: None    Years of education: None    Highest education level: None   Tobacco Use    Smoking status: Never    Smokeless tobacco: Never   Substance and Sexual Activity    Alcohol use: No    Drug use: No     Social Determinants of Health     Financial Resource Strain: Low Risk     Difficulty of Paying Living Expenses: Not hard at all   Food Insecurity: No Food Insecurity    Worried About [facility] in the Last Year: Never true    Ran Out of Food in the

## 2023-05-24 NOTE — PROGRESS NOTES
Chief Complaint   Patient presents with    Follow-up     3 month routine check up     Fatigue     1. Have you been to the ER, urgent care clinic since your last visit? Hospitalized since your last visit? No    2. Have you seen or consulted any other health care providers outside of the [address] [place] [address] since your last visit? Include any pap smears or colon screening.  yes

## 2023-05-25 LAB
ALBUMIN SERPL-MCNC: 3.6 G/DL (ref 3.5–5)
ALBUMIN/GLOB SERPL: 0.9 (ref 1.1–2.2)
ALP SERPL-CCNC: 79 U/L (ref 45–117)
ALT SERPL-CCNC: 18 U/L (ref 12–78)
ANION GAP SERPL CALC-SCNC: 4 MMOL/L (ref 5–15)
AST SERPL-CCNC: 15 U/L (ref 15–37)
BASOPHILS # BLD: 0 K/UL (ref 0–0.1)
BASOPHILS NFR BLD: 0 % (ref 0–1)
BILIRUB SERPL-MCNC: 0.3 MG/DL (ref 0.2–1)
BUN SERPL-MCNC: 12 MG/DL (ref 6–20)
BUN/CREAT SERPL: 11 (ref 12–20)
CALCIUM SERPL-MCNC: 9.4 MG/DL (ref 8.5–10.1)
CHLORIDE SERPL-SCNC: 105 MMOL/L (ref 97–108)
CHOLEST SERPL-MCNC: 223 MG/DL
CO2 SERPL-SCNC: 29 MMOL/L (ref 21–32)
CREAT SERPL-MCNC: 1.1 MG/DL (ref 0.55–1.02)
DIFFERENTIAL METHOD BLD: ABNORMAL
EOSINOPHIL # BLD: 0.3 K/UL (ref 0–0.4)
EOSINOPHIL NFR BLD: 3 % (ref 0–7)
ERYTHROCYTE [DISTWIDTH] IN BLOOD BY AUTOMATED COUNT: 14.1 % (ref 11.5–14.5)
GLOBULIN SER CALC-MCNC: 3.9 G/DL (ref 2–4)
GLUCOSE SERPL-MCNC: 91 MG/DL (ref 65–100)
HCT VFR BLD AUTO: 36.4 % (ref 35–47)
HDLC SERPL-MCNC: 98 MG/DL
HDLC SERPL: 2.3 (ref 0–5)
HGB BLD-MCNC: 10.4 G/DL (ref 11.5–16)
HIV 1+2 AB+HIV1 P24 AG SERPL QL IA: NONREACTIVE
HIV 1/2 RESULT COMMENT: NORMAL
IMM GRANULOCYTES # BLD AUTO: 0.1 K/UL (ref 0–0.04)
IMM GRANULOCYTES NFR BLD AUTO: 1 % (ref 0–0.5)
LDLC SERPL CALC-MCNC: 109.4 MG/DL (ref 0–100)
LYMPHOCYTES # BLD: 1.4 K/UL (ref 0.8–3.5)
LYMPHOCYTES NFR BLD: 15 % (ref 12–49)
MCH RBC QN AUTO: 27.7 PG (ref 26–34)
MCHC RBC AUTO-ENTMCNC: 28.6 G/DL (ref 30–36.5)
MCV RBC AUTO: 96.8 FL (ref 80–99)
MONOCYTES # BLD: 0.5 K/UL (ref 0–1)
MONOCYTES NFR BLD: 5 % (ref 5–13)
NEUTS SEG # BLD: 7 K/UL (ref 1.8–8)
NEUTS SEG NFR BLD: 76 % (ref 32–75)
NRBC # BLD: 0 K/UL (ref 0–0.01)
NRBC BLD-RTO: 0 PER 100 WBC
PLATELET # BLD AUTO: 315 K/UL (ref 150–400)
PMV BLD AUTO: 11.8 FL (ref 8.9–12.9)
POTASSIUM SERPL-SCNC: 4.4 MMOL/L (ref 3.5–5.1)
PROT SERPL-MCNC: 7.5 G/DL (ref 6.4–8.2)
RBC # BLD AUTO: 3.76 M/UL (ref 3.8–5.2)
RBC MORPH BLD: ABNORMAL
SODIUM SERPL-SCNC: 138 MMOL/L (ref 136–145)
TRIGL SERPL-MCNC: 78 MG/DL
VLDLC SERPL CALC-MCNC: 15.6 MG/DL
WBC # BLD AUTO: 9.3 K/UL (ref 3.6–11)

## 2023-06-28 ENCOUNTER — HOSPITAL ENCOUNTER (OUTPATIENT)
Facility: HOSPITAL | Age: 48
Discharge: HOME | End: 2023-07-01
Attending: INTERNAL MEDICINE
Payer: COMMERCIAL

## 2023-06-28 ENCOUNTER — TRANSCRIBE ORDERS (OUTPATIENT)
Facility: HOSPITAL | Age: 48
End: 2023-06-28

## 2023-06-28 DIAGNOSIS — Z12.31 ENCOUNTER FOR SCREENING MAMMOGRAM FOR BREAST CANCER: ICD-10-CM

## 2023-06-28 DIAGNOSIS — Z12.31 VISIT FOR SCREENING MAMMOGRAM: Primary | ICD-10-CM

## 2023-06-28 PROCEDURE — 77067 SCR MAMMO BI INCL CAD: CPT

## 2023-06-28 PROCEDURE — 77063 BREAST TOMOSYNTHESIS BI: CPT

## 2023-07-12 ENCOUNTER — OFFICE VISIT (OUTPATIENT)
Age: 48
End: 2023-07-12
Payer: COMMERCIAL

## 2023-07-12 ENCOUNTER — HOSPITAL ENCOUNTER (OUTPATIENT)
Facility: HOSPITAL | Age: 48
Setting detail: SPECIMEN
Discharge: HOME | End: 2023-07-15
Payer: COMMERCIAL

## 2023-07-12 VITALS — BODY MASS INDEX: 36.4 KG/M2 | WEIGHT: 199 LBS | DIASTOLIC BLOOD PRESSURE: 70 MMHG | SYSTOLIC BLOOD PRESSURE: 140 MMHG

## 2023-07-12 DIAGNOSIS — N92.4 EXCESSIVE BLEEDING IN PREMENOPAUSAL PERIOD: ICD-10-CM

## 2023-07-12 DIAGNOSIS — Z00.00 ANNUAL PHYSICAL EXAM: Primary | ICD-10-CM

## 2023-07-12 PROCEDURE — 3077F SYST BP >= 140 MM HG: CPT | Performed by: OBSTETRICS & GYNECOLOGY

## 2023-07-12 PROCEDURE — G8417 CALC BMI ABV UP PARAM F/U: HCPCS | Performed by: OBSTETRICS & GYNECOLOGY

## 2023-07-12 PROCEDURE — 88175 CYTOPATH C/V AUTO FLUID REDO: CPT

## 2023-07-12 PROCEDURE — 99213 OFFICE O/P EST LOW 20 MIN: CPT | Performed by: OBSTETRICS & GYNECOLOGY

## 2023-07-12 PROCEDURE — 87491 CHLMYD TRACH DNA AMP PROBE: CPT

## 2023-07-12 PROCEDURE — 87661 TRICHOMONAS VAGINALIS AMPLIF: CPT

## 2023-07-12 PROCEDURE — G8427 DOCREV CUR MEDS BY ELIG CLIN: HCPCS | Performed by: OBSTETRICS & GYNECOLOGY

## 2023-07-12 PROCEDURE — 1036F TOBACCO NON-USER: CPT | Performed by: OBSTETRICS & GYNECOLOGY

## 2023-07-12 PROCEDURE — 87624 HPV HI-RISK TYP POOLED RSLT: CPT

## 2023-07-12 PROCEDURE — 87591 N.GONORRHOEAE DNA AMP PROB: CPT

## 2023-07-12 PROCEDURE — 3078F DIAST BP <80 MM HG: CPT | Performed by: OBSTETRICS & GYNECOLOGY

## 2023-07-12 PROCEDURE — 99396 PREV VISIT EST AGE 40-64: CPT | Performed by: OBSTETRICS & GYNECOLOGY

## 2023-07-12 NOTE — PROGRESS NOTES
central or paravaginal defects, no discharge present, no inflammatory lesions present, no masses present  Bladder: non-tender to palpation  Urethra: appears normal  Cervix: normal   Uterus: normal size, shape and consistency, small, mobile  Adnexa: no adnexal tenderness present, no adnexal masses present  Perineum: perineum within normal limits, no evidence of trauma, no rashes or skin lesions present    Skin  General Inspection: no rash, no lesions identified    Neurologic/Psychiatric  Mental Status:  Orientation: grossly oriented to person, place and time  Mood and Affect: mood normal, affect appropriate      Assessment/Plan:  Kelli Manzanares is a 50 y.o. female presenting for her annual exam. Overall doing well. 1. Annual physical exam  - PAP IG, CT-NG-TV, Aptima HPV and rfx 16/18,45 ([id number])    2. Excessive bleeding in premenopausal period  -we discussed the ddx to her HMB in detail. S/p recent sampling that was normal. Discussed hormaonal treatment options including Slynd and progestin IUD. Patient uncertain. Given pamphlet on IUD. Will call when she decides on POC. Well woman exam:  Normal gynecologic and breast exams. Healthy habits and lifestyle reviewed. Pap with HPV performed today. Patient desires STD screening.   Mammogram up to date     Patient will follow up PRN or next annual exam.      Osiris Gilman MD

## 2023-07-14 ENCOUNTER — TELEPHONE (OUTPATIENT)
Age: 48
End: 2023-07-14

## 2023-07-14 DIAGNOSIS — E55.9 VITAMIN D DEFICIENCY, UNSPECIFIED: ICD-10-CM

## 2023-07-14 LAB
C TRACH RRNA SPEC QL NAA+PROBE: POSITIVE
N GONORRHOEA RRNA SPEC QL NAA+PROBE: NEGATIVE
SPECIMEN SOURCE: ABNORMAL
T VAGINALIS RRNA SPEC QL NAA+PROBE: NEGATIVE

## 2023-07-14 RX ORDER — AZITHROMYCIN 500 MG/1
1000 TABLET, FILM COATED ORAL ONCE
Qty: 2 TABLET | Refills: 0 | Status: SHIPPED | OUTPATIENT
Start: 2023-07-14 | End: 2023-07-14

## 2023-07-14 NOTE — TELEPHONE ENCOUNTER
Patient calling name and  verified. Ss patient Patient saw her results form 2023. Please review, she sees she is positive for Chlamydia. Pharmacy verified.       Please advise Thank you

## 2023-07-14 NOTE — TELEPHONE ENCOUNTER
Called patient and advised per below , script sent. Saleem Granados MD  to Me    SP     2:59 PM  +chlamydia - Please inform patient and send Rx for azithromycin 1g PO x 1 dose to patient's pharmacy. Please  on safe sexual practices, treatment of partner(s), and advise no sex until at least 2 weeks after all partners treated. Thank you! Patient verbalized understanding.

## 2023-07-15 RX ORDER — DOXYCYCLINE HYCLATE 100 MG
100 TABLET ORAL 2 TIMES DAILY
Qty: 14 TABLET | Refills: 0 | Status: SHIPPED | OUTPATIENT
Start: 2023-07-15 | End: 2023-07-22

## 2023-07-17 RX ORDER — ACETAMINOPHEN 160 MG
1 TABLET,DISINTEGRATING ORAL DAILY
Qty: 90 CAPSULE | Refills: 1 | OUTPATIENT
Start: 2023-07-17

## 2023-07-17 RX ORDER — AZITHROMYCIN 500 MG/1
1000 TABLET, FILM COATED ORAL ONCE
Qty: 2 TABLET | Refills: 0 | OUTPATIENT
Start: 2023-07-17 | End: 2023-07-17

## 2023-07-28 RX ORDER — DOXYCYCLINE HYCLATE 100 MG
TABLET ORAL
Qty: 14 TABLET | Refills: 0 | OUTPATIENT
Start: 2023-07-28

## 2023-07-29 RX ORDER — NIFEDIPINE 30 MG/1
TABLET, FILM COATED, EXTENDED RELEASE ORAL
Qty: 90 TABLET | Refills: 1 | Status: SHIPPED | OUTPATIENT
Start: 2023-07-29

## 2023-08-07 RX ORDER — LISINOPRIL 40 MG/1
TABLET ORAL
Qty: 90 TABLET | Refills: 1 | Status: SHIPPED | OUTPATIENT
Start: 2023-08-07

## 2023-08-26 ENCOUNTER — OFFICE VISIT (OUTPATIENT)
Age: 48
End: 2023-08-26

## 2023-08-26 VITALS
DIASTOLIC BLOOD PRESSURE: 93 MMHG | BODY MASS INDEX: 36.44 KG/M2 | HEIGHT: 62 IN | TEMPERATURE: 99.2 F | OXYGEN SATURATION: 96 % | HEART RATE: 98 BPM | WEIGHT: 198 LBS | SYSTOLIC BLOOD PRESSURE: 220 MMHG

## 2023-08-26 DIAGNOSIS — J02.9 PHARYNGITIS, UNSPECIFIED ETIOLOGY: ICD-10-CM

## 2023-08-26 DIAGNOSIS — H66.012 NON-RECURRENT ACUTE SUPPURATIVE OTITIS MEDIA OF LEFT EAR WITH SPONTANEOUS RUPTURE OF TYMPANIC MEMBRANE: Primary | ICD-10-CM

## 2023-08-26 DIAGNOSIS — H66.91 ACUTE OTITIS MEDIA, RIGHT: ICD-10-CM

## 2023-08-26 DIAGNOSIS — I10 HYPERTENSION, UNSPECIFIED TYPE: ICD-10-CM

## 2023-08-26 RX ORDER — AMOXICILLIN AND CLAVULANATE POTASSIUM 875; 125 MG/1; MG/1
1 TABLET, FILM COATED ORAL 2 TIMES DAILY
Qty: 14 TABLET | Refills: 0 | Status: SHIPPED | OUTPATIENT
Start: 2023-08-26 | End: 2023-09-02

## 2023-08-26 ASSESSMENT — ENCOUNTER SYMPTOMS
SINUS PAIN: 0
RHINORRHEA: 1
SORE THROAT: 1
SINUS PRESSURE: 0
SHORTNESS OF BREATH: 0
WHEEZING: 0
COUGH: 0

## 2023-08-26 NOTE — PROGRESS NOTES
eben Choi (:  1975) is a 50 y.o. female, here for evaluation of the following chief complaint(s):  New Patient and URI (Started Wenesday morning, diagnosed with an ear infection and ear started bleeding. Sore throat and cough, disturbing sleep. Nose is clogged up.)      ASSESSMENT/PLAN:   Diagnosis Orders   1. Non-recurrent acute suppurative otitis media of left ear with spontaneous rupture of tympanic membrane        2. Acute otitis media, right        3. Pharyngitis, unspecified etiology        4. Hypertension, unspecified type          Orders Placed This Encounter    amoxicillin-clavulanate (AUGMENTIN) 875-125 MG per tablet     Sig: Take 1 tablet by mouth 2 times daily for 7 days     Dispense:  14 tablet     Refill:  0     No results found for any visits on 23. The patients condition was discussed with the patient and they understand. The patient is to follow up with primary care doctor. If signs and symptoms become worse the pt is to go to the ER. The patient is to take medications as prescribed. Patient advised to take b/p medication as prescribed and check her b/p at home. If not responsive to b/p medication (under 200 SBP) she is to go to the emergency room. S/S of hypertensive crisis explained to the patient. SUBJECTIVE/OBJECTIVE:  Patient presents with a 3-day history of ear discomfort. Since then her ear has had some bloody drainage. She additionally has sore throat cough nasal congestion. She presents today due to ongoing sleep disturbance due to her symptoms. She has been using a wax removal kit on the left ear. Otherwise she has not taken any thing for her symptoms. Denies flushing, headache or vision changes. Review of Systems   Constitutional:  Negative for activity change, appetite change, chills and fever. HENT:  Positive for congestion, ear discharge, ear pain, hearing loss (Clogged feeling), postnasal drip, rhinorrhea and sore throat.  Negative

## 2023-08-30 ENCOUNTER — OFFICE VISIT (OUTPATIENT)
Age: 48
End: 2023-08-30

## 2023-08-30 ENCOUNTER — NURSE TRIAGE (OUTPATIENT)
Dept: OTHER | Facility: CLINIC | Age: 48
End: 2023-08-30

## 2023-08-30 VITALS
WEIGHT: 199 LBS | HEIGHT: 62 IN | OXYGEN SATURATION: 95 % | RESPIRATION RATE: 16 BRPM | TEMPERATURE: 97.7 F | DIASTOLIC BLOOD PRESSURE: 98 MMHG | HEART RATE: 81 BPM | BODY MASS INDEX: 36.62 KG/M2 | SYSTOLIC BLOOD PRESSURE: 214 MMHG

## 2023-08-30 DIAGNOSIS — H65.02 NON-RECURRENT ACUTE SEROUS OTITIS MEDIA OF LEFT EAR: Primary | ICD-10-CM

## 2023-08-30 RX ORDER — LORATADINE 10 MG/1
10 TABLET ORAL DAILY
Qty: 15 TABLET | Refills: 0 | Status: SHIPPED | OUTPATIENT
Start: 2023-08-30

## 2023-08-30 RX ORDER — PREDNISONE 20 MG/1
20 TABLET ORAL 2 TIMES DAILY
Qty: 10 TABLET | Refills: 0 | Status: SHIPPED | OUTPATIENT
Start: 2023-08-30 | End: 2023-09-04

## 2023-08-30 RX ORDER — FLUTICASONE PROPIONATE 50 MCG
1 SPRAY, SUSPENSION (ML) NASAL DAILY
Qty: 16 G | Refills: 0 | Status: SHIPPED | OUTPATIENT
Start: 2023-08-30

## 2023-08-30 ASSESSMENT — ENCOUNTER SYMPTOMS: RHINORRHEA: 0

## 2023-08-30 NOTE — TELEPHONE ENCOUNTER
[facility]    Received call from [address] at [facility] with [employer]. Subjective: Caller states \" went to [facility] Saturday, runny nose and coughing and ear clogged. L ear hard to hear out of. Put on antibiotics. \"     Current Symptoms:   \"Went for ear infection, got checked and one ear had blood and was clogged, they put me on antibitcs for the cough and sore throat, cleared up sore throat and ear soreness, ears are still clogged and still coughing and I had a fever Saturday. \"    Started antibiotics saturday. Diarrhea started yesterday, more than 7 times a day. Onset: 4 days ago;     Associated Symptoms: NA    Pain Severity: 0/10; N/A; none    Temperature: Denies      What has been tried: On antibiotics    Recommended disposition: See in Office Today    Care advice provided, patient verbalizes understanding; denies any other questions or concerns; instructed to call back for any new or worsening symptoms. Patient/Caller agrees with recommended disposition; writer provided warm transfer to Audrey Goodell at [facility] for appointment scheduling    Attention Provider: Thank you for allowing me to participate in the care of your patient. The patient was connected to triage in response to information provided to the ECC/PSC. Please do not respond through this encounter as the response is not directed to a shared pool.       Reason for Disposition   SEVERE diarrhea (e.g., 7 or more times / day more than normal) and present > 24 hours (1 day)    Protocols used: Diarrhea-ADULT-[place]

## 2023-09-13 ENCOUNTER — OFFICE VISIT (OUTPATIENT)
Age: 48
End: 2023-09-13
Payer: COMMERCIAL

## 2023-09-13 VITALS
DIASTOLIC BLOOD PRESSURE: 102 MMHG | HEIGHT: 62 IN | BODY MASS INDEX: 36.8 KG/M2 | WEIGHT: 200 LBS | HEART RATE: 88 BPM | OXYGEN SATURATION: 98 % | SYSTOLIC BLOOD PRESSURE: 198 MMHG | TEMPERATURE: 98.7 F | RESPIRATION RATE: 20 BRPM

## 2023-09-13 VITALS
SYSTOLIC BLOOD PRESSURE: 140 MMHG | BODY MASS INDEX: 36.99 KG/M2 | DIASTOLIC BLOOD PRESSURE: 80 MMHG | WEIGHT: 201 LBS | HEIGHT: 62 IN

## 2023-09-13 DIAGNOSIS — I10 HYPERTENSION GOAL BP (BLOOD PRESSURE) < 130/80: Primary | ICD-10-CM

## 2023-09-13 DIAGNOSIS — A74.9 CHLAMYDIA: Primary | ICD-10-CM

## 2023-09-13 PROCEDURE — 1036F TOBACCO NON-USER: CPT | Performed by: OBSTETRICS & GYNECOLOGY

## 2023-09-13 PROCEDURE — 1036F TOBACCO NON-USER: CPT | Performed by: INTERNAL MEDICINE

## 2023-09-13 PROCEDURE — 3080F DIAST BP >= 90 MM HG: CPT | Performed by: INTERNAL MEDICINE

## 2023-09-13 PROCEDURE — 99213 OFFICE O/P EST LOW 20 MIN: CPT | Performed by: OBSTETRICS & GYNECOLOGY

## 2023-09-13 PROCEDURE — G8417 CALC BMI ABV UP PARAM F/U: HCPCS | Performed by: OBSTETRICS & GYNECOLOGY

## 2023-09-13 PROCEDURE — G8428 CUR MEDS NOT DOCUMENT: HCPCS | Performed by: OBSTETRICS & GYNECOLOGY

## 2023-09-13 PROCEDURE — 3077F SYST BP >= 140 MM HG: CPT | Performed by: INTERNAL MEDICINE

## 2023-09-13 PROCEDURE — 99213 OFFICE O/P EST LOW 20 MIN: CPT | Performed by: INTERNAL MEDICINE

## 2023-09-13 PROCEDURE — 3077F SYST BP >= 140 MM HG: CPT | Performed by: OBSTETRICS & GYNECOLOGY

## 2023-09-13 PROCEDURE — G8427 DOCREV CUR MEDS BY ELIG CLIN: HCPCS | Performed by: INTERNAL MEDICINE

## 2023-09-13 PROCEDURE — G8417 CALC BMI ABV UP PARAM F/U: HCPCS | Performed by: INTERNAL MEDICINE

## 2023-09-13 PROCEDURE — 3079F DIAST BP 80-89 MM HG: CPT | Performed by: OBSTETRICS & GYNECOLOGY

## 2023-09-13 RX ORDER — LISINOPRIL 40 MG/1
40 TABLET ORAL DAILY
Qty: 90 TABLET | Refills: 1 | Status: SHIPPED | OUTPATIENT
Start: 2023-09-13

## 2023-09-13 RX ORDER — NIFEDIPINE 60 MG/1
60 TABLET, EXTENDED RELEASE ORAL DAILY
Qty: 90 TABLET | Refills: 1 | Status: SHIPPED | OUTPATIENT
Start: 2023-09-13

## 2023-09-13 RX ORDER — HYDROCHLOROTHIAZIDE 25 MG/1
25 TABLET ORAL EVERY MORNING
Qty: 90 TABLET | Refills: 1 | Status: SHIPPED | OUTPATIENT
Start: 2023-09-13

## 2023-09-13 RX ORDER — NIFEDIPINE 30 MG/1
30 TABLET, FILM COATED, EXTENDED RELEASE ORAL DAILY
Qty: 60 TABLET | Refills: 1 | Status: CANCELLED | OUTPATIENT
Start: 2023-09-13

## 2023-09-13 SDOH — ECONOMIC STABILITY: INCOME INSECURITY: HOW HARD IS IT FOR YOU TO PAY FOR THE VERY BASICS LIKE FOOD, HOUSING, MEDICAL CARE, AND HEATING?: NOT HARD AT ALL

## 2023-09-13 SDOH — ECONOMIC STABILITY: FOOD INSECURITY: WITHIN THE PAST 12 MONTHS, THE FOOD YOU BOUGHT JUST DIDN'T LAST AND YOU DIDN'T HAVE MONEY TO GET MORE.: NEVER TRUE

## 2023-09-13 SDOH — ECONOMIC STABILITY: FOOD INSECURITY: WITHIN THE PAST 12 MONTHS, YOU WORRIED THAT YOUR FOOD WOULD RUN OUT BEFORE YOU GOT MONEY TO BUY MORE.: NEVER TRUE

## 2023-09-13 ASSESSMENT — PATIENT HEALTH QUESTIONNAIRE - PHQ9
1. LITTLE INTEREST OR PLEASURE IN DOING THINGS: 0
SUM OF ALL RESPONSES TO PHQ QUESTIONS 1-9: 0
SUM OF ALL RESPONSES TO PHQ QUESTIONS 1-9: 0
2. FEELING DOWN, DEPRESSED OR HOPELESS: 0
SUM OF ALL RESPONSES TO PHQ QUESTIONS 1-9: 0
SUM OF ALL RESPONSES TO PHQ9 QUESTIONS 1 & 2: 0
SUM OF ALL RESPONSES TO PHQ QUESTIONS 1-9: 0

## 2023-09-13 ASSESSMENT — ANXIETY QUESTIONNAIRES
7. FEELING AFRAID AS IF SOMETHING AWFUL MIGHT HAPPEN: 0
2. NOT BEING ABLE TO STOP OR CONTROL WORRYING: 0
5. BEING SO RESTLESS THAT IT IS HARD TO SIT STILL: 0
4. TROUBLE RELAXING: 0
3. WORRYING TOO MUCH ABOUT DIFFERENT THINGS: 0
1. FEELING NERVOUS, ANXIOUS, OR ON EDGE: 0
GAD7 TOTAL SCORE: 0
IF YOU CHECKED OFF ANY PROBLEMS ON THIS QUESTIONNAIRE, HOW DIFFICULT HAVE THESE PROBLEMS MADE IT FOR YOU TO DO YOUR WORK, TAKE CARE OF THINGS AT HOME, OR GET ALONG WITH OTHER PEOPLE: NOT DIFFICULT AT ALL
6. BECOMING EASILY ANNOYED OR IRRITABLE: 0

## 2023-09-13 NOTE — PROGRESS NOTES
Financial Resource Strain: Low Risk  (9/13/2023)    Overall Financial Resource Strain (CARDIA)     Difficulty of Paying Living Expenses: Not hard at all   Food Insecurity: No Food Insecurity (9/13/2023)    Hunger Vital Sign     Worried About Running Out of Food in the Last Year: Never true     Ran Out of Food in the Last Year: Never true   Transportation Needs: Unknown (9/13/2023)    PRAPARE - Transportation     Lack of Transportation (Medical): Not on file     Lack of Transportation (Non-Medical): No   Physical Activity: Not on file   Stress: Not on file   Social Connections: Not on file   Intimate Partner Violence: Not on file   Housing Stability: Unknown (9/13/2023)    Housing Stability Vital Sign     Unable to Pay for Housing in the Last Year: Not on file     Number of Places Lived in the Last Year: Not on file     Unstable Housing in the Last Year: No       Current Outpatient Medications   Medication Sig Dispense Refill    lisinopril (PRINIVIL;ZESTRIL) 40 MG tablet Take 1 tablet by mouth daily 90 tablet 1    NIFEdipine (PROCARDIA XL) 60 MG extended release tablet Take 1 tablet by mouth daily 90 tablet 1    hydroCHLOROthiazide (HYDRODIURIL) 25 MG tablet Take 1 tablet by mouth every morning 90 tablet 1    fluticasone (FLONASE) 50 MCG/ACT nasal spray 1 spray by Each Nostril route daily 16 g 0    loratadine (CLARITIN) 10 MG tablet Take 1 tablet by mouth daily 15 tablet 0    Cholecalciferol (VITAMIN D3) 50 MCG (2000 UT) CAPS Take 1 capsule by mouth daily 90 capsule 1    albuterol sulfate HFA (PROVENTIL;VENTOLIN;PROAIR) 108 (90 Base) MCG/ACT inhaler Inhale 2 puffs into the lungs every 4 hours as needed      ferrous sulfate (IRON 325) 325 (65 Fe) MG tablet Take by mouth every morning (before breakfast)       No current facility-administered medications for this visit.        Allergies   Allergen Reactions    Sulfamethoxazole-Trimethoprim Other (See Comments)     GI Upset       Review of Systems - History obtained

## 2023-09-16 LAB
C TRACH RRNA SPEC QL NAA+PROBE: NEGATIVE
N GONORRHOEA RRNA SPEC QL NAA+PROBE: NEGATIVE
T VAGINALIS RRNA SPEC QL NAA+PROBE: NEGATIVE

## 2023-09-20 ENCOUNTER — OFFICE VISIT (OUTPATIENT)
Age: 48
End: 2023-09-20
Payer: COMMERCIAL

## 2023-09-20 VITALS
RESPIRATION RATE: 20 BRPM | DIASTOLIC BLOOD PRESSURE: 86 MMHG | BODY MASS INDEX: 37.36 KG/M2 | SYSTOLIC BLOOD PRESSURE: 156 MMHG | WEIGHT: 203 LBS | OXYGEN SATURATION: 98 % | HEART RATE: 90 BPM | TEMPERATURE: 98 F | HEIGHT: 62 IN

## 2023-09-20 DIAGNOSIS — I10 HYPERTENSION GOAL BP (BLOOD PRESSURE) < 130/80: Primary | ICD-10-CM

## 2023-09-20 PROCEDURE — G8427 DOCREV CUR MEDS BY ELIG CLIN: HCPCS | Performed by: INTERNAL MEDICINE

## 2023-09-20 PROCEDURE — 3074F SYST BP LT 130 MM HG: CPT | Performed by: INTERNAL MEDICINE

## 2023-09-20 PROCEDURE — G8417 CALC BMI ABV UP PARAM F/U: HCPCS | Performed by: INTERNAL MEDICINE

## 2023-09-20 PROCEDURE — 1036F TOBACCO NON-USER: CPT | Performed by: INTERNAL MEDICINE

## 2023-09-20 PROCEDURE — 3078F DIAST BP <80 MM HG: CPT | Performed by: INTERNAL MEDICINE

## 2023-09-20 PROCEDURE — 99213 OFFICE O/P EST LOW 20 MIN: CPT | Performed by: INTERNAL MEDICINE

## 2023-09-20 RX ORDER — HYDRALAZINE HYDROCHLORIDE 50 MG/1
50 TABLET, FILM COATED ORAL 2 TIMES DAILY
Qty: 60 TABLET | Refills: 3 | Status: SHIPPED | OUTPATIENT
Start: 2023-09-20

## 2023-09-20 NOTE — PROGRESS NOTES
edema    Assessment/Plan:  Shakila Adler was seen today for results. Diagnoses and all orders for this visit:    Hypertension goal BP (blood pressure) < 130/80  -     hydrALAZINE (APRESOLINE) 50 MG tablet; Take 1 tablet by mouth in the morning and at bedtime      Return in about 2 weeks (around 10/4/2023) for blood pressure.

## 2023-10-04 ENCOUNTER — OFFICE VISIT (OUTPATIENT)
Age: 48
End: 2023-10-04
Payer: COMMERCIAL

## 2023-10-04 VITALS
OXYGEN SATURATION: 98 % | TEMPERATURE: 97.7 F | WEIGHT: 207 LBS | BODY MASS INDEX: 38.09 KG/M2 | SYSTOLIC BLOOD PRESSURE: 127 MMHG | DIASTOLIC BLOOD PRESSURE: 76 MMHG | HEIGHT: 62 IN | HEART RATE: 76 BPM | RESPIRATION RATE: 20 BRPM

## 2023-10-04 DIAGNOSIS — I10 HYPERTENSION, WELL CONTROLLED: Primary | ICD-10-CM

## 2023-10-04 DIAGNOSIS — J30.89 ALLERGIC RHINITIS DUE TO OTHER ALLERGIC TRIGGER, UNSPECIFIED SEASONALITY: ICD-10-CM

## 2023-10-04 DIAGNOSIS — Z12.12 ENCOUNTER FOR COLORECTAL CANCER SCREENING: ICD-10-CM

## 2023-10-04 DIAGNOSIS — Z12.11 ENCOUNTER FOR COLORECTAL CANCER SCREENING: ICD-10-CM

## 2023-10-04 PROCEDURE — 1036F TOBACCO NON-USER: CPT | Performed by: INTERNAL MEDICINE

## 2023-10-04 PROCEDURE — 99214 OFFICE O/P EST MOD 30 MIN: CPT | Performed by: INTERNAL MEDICINE

## 2023-10-04 PROCEDURE — 3078F DIAST BP <80 MM HG: CPT | Performed by: INTERNAL MEDICINE

## 2023-10-04 PROCEDURE — 3074F SYST BP LT 130 MM HG: CPT | Performed by: INTERNAL MEDICINE

## 2023-10-04 PROCEDURE — G8417 CALC BMI ABV UP PARAM F/U: HCPCS | Performed by: INTERNAL MEDICINE

## 2023-10-04 PROCEDURE — G8484 FLU IMMUNIZE NO ADMIN: HCPCS | Performed by: INTERNAL MEDICINE

## 2023-10-04 PROCEDURE — G8427 DOCREV CUR MEDS BY ELIG CLIN: HCPCS | Performed by: INTERNAL MEDICINE

## 2023-10-04 RX ORDER — HYDRALAZINE HYDROCHLORIDE 50 MG/1
50 TABLET, FILM COATED ORAL 2 TIMES DAILY
Qty: 60 TABLET | Refills: 3 | Status: SHIPPED | OUTPATIENT
Start: 2023-10-04

## 2023-10-04 RX ORDER — LISINOPRIL 40 MG/1
40 TABLET ORAL DAILY
Qty: 90 TABLET | Refills: 1 | Status: SHIPPED | OUTPATIENT
Start: 2023-10-04

## 2023-10-04 RX ORDER — HYDROCHLOROTHIAZIDE 25 MG/1
25 TABLET ORAL EVERY MORNING
Qty: 90 TABLET | Refills: 1 | Status: SHIPPED | OUTPATIENT
Start: 2023-10-04

## 2023-10-04 RX ORDER — LORATADINE 10 MG/1
10 TABLET ORAL DAILY
Qty: 90 TABLET | Refills: 1 | Status: SHIPPED | OUTPATIENT
Start: 2023-10-04

## 2023-10-04 RX ORDER — ALBUTEROL SULFATE 90 UG/1
2 AEROSOL, METERED RESPIRATORY (INHALATION) EVERY 4 HOURS PRN
Qty: 18 G | Refills: 2 | Status: SHIPPED | OUTPATIENT
Start: 2023-10-04

## 2023-10-04 RX ORDER — NIFEDIPINE 60 MG/1
60 TABLET, EXTENDED RELEASE ORAL DAILY
Qty: 90 TABLET | Refills: 1 | Status: SHIPPED | OUTPATIENT
Start: 2023-10-04

## 2023-10-04 ASSESSMENT — PATIENT HEALTH QUESTIONNAIRE - PHQ9
SUM OF ALL RESPONSES TO PHQ9 QUESTIONS 1 & 2: 0
2. FEELING DOWN, DEPRESSED OR HOPELESS: 0
SUM OF ALL RESPONSES TO PHQ QUESTIONS 1-9: 0
SUM OF ALL RESPONSES TO PHQ QUESTIONS 1-9: 0
1. LITTLE INTEREST OR PLEASURE IN DOING THINGS: 0
SUM OF ALL RESPONSES TO PHQ QUESTIONS 1-9: 0
SUM OF ALL RESPONSES TO PHQ QUESTIONS 1-9: 0

## 2023-10-04 NOTE — PROGRESS NOTES
Chief Complaint   Patient presents with    Blood Pressure Check       HPI:  Bernadine Pérez is a 50 y.o. female presents for blood pressure check. Blood pressure reading is at goal on current treatment. Advise to continue medications  Patient is requesting refill on inhaler. Also, she has not done colon cancer screen    Review of Systems  As per hpi    Past Medical History:   Diagnosis Date    Allergy, unspecified not elsewhere classified     Anemia     Ankle sprain 03/2009    bilateral.  Dr. Josué Flaherty childhood    Breast mass 5/5/09    Left. benign. Chickenpox childhood    Ectopic pregnancy 04/2010    Right. Dr. Eliz Read    Essential hypertension, benign     Menorrhagia 08/2015    Dr. Hilario Hicks    Ovarian cyst 2011    R. Dr. Eliz Read    Pure hypercholesterolemia 05/2009    Thrombocytosis 05/2009    Tubal pregnancy 06/2011    Dr Eliz Read     Vitamin D deficiency 05/2009     Past Surgical History:   Procedure Laterality Date    DILATION AND CURETTAGE      DILATION AND CURETTAGE OF UTERUS  08/11/2015    Dr. Hilario Hicks. due to heavy vag bleeding and thick endometrial stripe. SALPINGO-OOPHORECTOMY Right 04/2010    Right. due to ectopic pregnancy. Dr. Eliz Read.     TUBAL LIGATION  2002    Dr. Hilario Hicks     Social History     Socioeconomic History    Marital status:    Tobacco Use    Smoking status: Never    Smokeless tobacco: Never   Vaping Use    Vaping Use: Never used   Substance and Sexual Activity    Alcohol use: No    Drug use: No    Sexual activity: Yes     Birth control/protection: Surgical     Comment: TL     Social Determinants of Health     Financial Resource Strain: Low Risk  (9/13/2023)    Overall Financial Resource Strain (CARDIA)     Difficulty of Paying Living Expenses: Not hard at all   Food Insecurity: No Food Insecurity (9/13/2023)    Hunger Vital Sign     Worried About Running Out of Food in the Last Year: Never true     Ran Out of

## 2023-10-11 ENCOUNTER — TELEPHONE (OUTPATIENT)
Age: 48
End: 2023-10-11

## 2023-10-11 NOTE — TELEPHONE ENCOUNTER
Called patient to follow up on sent message about needing an appointment. Left message with call back number.

## 2023-11-08 ENCOUNTER — OFFICE VISIT (OUTPATIENT)
Age: 48
End: 2023-11-08
Payer: COMMERCIAL

## 2023-11-08 VITALS — DIASTOLIC BLOOD PRESSURE: 98 MMHG | WEIGHT: 210 LBS | SYSTOLIC BLOOD PRESSURE: 142 MMHG | BODY MASS INDEX: 38.41 KG/M2

## 2023-11-08 DIAGNOSIS — Z13.9 SCREENING DUE: Primary | ICD-10-CM

## 2023-11-08 DIAGNOSIS — N95.1 VASOMOTOR SYMPTOMS DUE TO MENOPAUSE: ICD-10-CM

## 2023-11-08 DIAGNOSIS — N92.6 IRREGULAR MENSES: ICD-10-CM

## 2023-11-08 PROCEDURE — 1036F TOBACCO NON-USER: CPT | Performed by: OBSTETRICS & GYNECOLOGY

## 2023-11-08 PROCEDURE — 3017F COLORECTAL CA SCREEN DOC REV: CPT | Performed by: OBSTETRICS & GYNECOLOGY

## 2023-11-08 PROCEDURE — G8484 FLU IMMUNIZE NO ADMIN: HCPCS | Performed by: OBSTETRICS & GYNECOLOGY

## 2023-11-08 PROCEDURE — G8427 DOCREV CUR MEDS BY ELIG CLIN: HCPCS | Performed by: OBSTETRICS & GYNECOLOGY

## 2023-11-08 PROCEDURE — 99214 OFFICE O/P EST MOD 30 MIN: CPT | Performed by: OBSTETRICS & GYNECOLOGY

## 2023-11-08 PROCEDURE — 3080F DIAST BP >= 90 MM HG: CPT | Performed by: OBSTETRICS & GYNECOLOGY

## 2023-11-08 PROCEDURE — 3077F SYST BP >= 140 MM HG: CPT | Performed by: OBSTETRICS & GYNECOLOGY

## 2023-11-08 PROCEDURE — G8417 CALC BMI ABV UP PARAM F/U: HCPCS | Performed by: OBSTETRICS & GYNECOLOGY

## 2023-11-08 NOTE — PROGRESS NOTES
Established patient GYN problem visit:    Juana Marmolejo is a 46 y.o.  presenting with concerns of irregular menses and hot flashes. Patient is also been experiencing night sweats. This is interfering with her sleep. This is progressively worsening over the last few months. Patient reports having a menstrual cycle in October that last for 2 days which consisted of spotting. She had a menstrual cycle that lasted 1 day this month. Ob/Gyn Hx:  G P  -  LMP- 11/05/2023 x1 day  Menses- 4d  Contraception- Tubal Ligation  SA- Yes, male    Health maintenance:  Pap -up-to-date  Mammo - 2023, BI-RADS1  Colonoscopy -has never had      Past Medical History:   Diagnosis Date    Allergy, unspecified not elsewhere classified     Anemia     Ankle sprain 2009    bilateral.  Dr. Danny Arthur childhood    Breast mass 09    Left. benign. Chickenpox childhood    Ectopic pregnancy 2010    Right. Dr. Tashi Barry    Essential hypertension, benign     Menorrhagia 2015    Dr. Chani Longoria    Ovarian cyst     R. Dr. Tashi Barry    Pure hypercholesterolemia 2009    Thrombocytosis 2009    Tubal pregnancy 2011    Dr Tashi Barry     Vitamin D deficiency 2009       Past Surgical History:   Procedure Laterality Date    DILATION AND CURETTAGE      DILATION AND CURETTAGE OF UTERUS  2015    Dr. Chani Longoria. due to heavy vag bleeding and thick endometrial stripe. SALPINGO-OOPHORECTOMY Right 2010    Right. due to ectopic pregnancy. Dr. Tashi Barry.     Fran Cruz      Dr. Chani Longoria       Family History   Problem Relation Age of Onset    Diabetes Father         type ll    Breast Cancer Sister 72    Breast Cancer Maternal Aunt 67    Anesth Problems Neg Hx        Social History     Socioeconomic History    Marital status:      Spouse name: Not on file    Number of children: Not on file    Years of education: Not on file    Highest education

## 2023-11-16 DIAGNOSIS — J30.89 ALLERGIC RHINITIS DUE TO OTHER ALLERGIC TRIGGER, UNSPECIFIED SEASONALITY: ICD-10-CM

## 2023-11-17 RX ORDER — ALBUTEROL SULFATE 90 UG/1
2 AEROSOL, METERED RESPIRATORY (INHALATION) EVERY 4 HOURS PRN
Qty: 6.7 G | Refills: 3 | Status: SHIPPED | OUTPATIENT
Start: 2023-11-17

## 2024-01-10 ENCOUNTER — OFFICE VISIT (OUTPATIENT)
Age: 49
End: 2024-01-10
Payer: COMMERCIAL

## 2024-01-10 VITALS
HEIGHT: 62 IN | BODY MASS INDEX: 38.83 KG/M2 | DIASTOLIC BLOOD PRESSURE: 78 MMHG | RESPIRATION RATE: 20 BRPM | SYSTOLIC BLOOD PRESSURE: 165 MMHG | HEART RATE: 100 BPM | OXYGEN SATURATION: 100 % | TEMPERATURE: 98.7 F | WEIGHT: 211 LBS

## 2024-01-10 DIAGNOSIS — J20.9 ACUTE BRONCHITIS DUE TO INFECTION: ICD-10-CM

## 2024-01-10 DIAGNOSIS — E78.00 PURE HYPERCHOLESTEROLEMIA: ICD-10-CM

## 2024-01-10 DIAGNOSIS — E55.9 VITAMIN D DEFICIENCY: ICD-10-CM

## 2024-01-10 DIAGNOSIS — J45.20 MILD INTERMITTENT ASTHMA, UNCOMPLICATED: ICD-10-CM

## 2024-01-10 DIAGNOSIS — I10 HYPERTENSION, WELL CONTROLLED: ICD-10-CM

## 2024-01-10 DIAGNOSIS — D50.9 HYPOCHROMIC MICROCYTIC ANEMIA: ICD-10-CM

## 2024-01-10 DIAGNOSIS — E03.9 HYPOTHYROIDISM, UNSPECIFIED TYPE: ICD-10-CM

## 2024-01-10 DIAGNOSIS — R73.03 BORDERLINE DIABETES MELLITUS: ICD-10-CM

## 2024-01-10 DIAGNOSIS — I10 ESSENTIAL HYPERTENSION, BENIGN: Primary | ICD-10-CM

## 2024-01-10 PROCEDURE — 3077F SYST BP >= 140 MM HG: CPT | Performed by: INTERNAL MEDICINE

## 2024-01-10 PROCEDURE — 3078F DIAST BP <80 MM HG: CPT | Performed by: INTERNAL MEDICINE

## 2024-01-10 PROCEDURE — 1036F TOBACCO NON-USER: CPT | Performed by: INTERNAL MEDICINE

## 2024-01-10 PROCEDURE — G8427 DOCREV CUR MEDS BY ELIG CLIN: HCPCS | Performed by: INTERNAL MEDICINE

## 2024-01-10 PROCEDURE — 99214 OFFICE O/P EST MOD 30 MIN: CPT | Performed by: INTERNAL MEDICINE

## 2024-01-10 PROCEDURE — G8484 FLU IMMUNIZE NO ADMIN: HCPCS | Performed by: INTERNAL MEDICINE

## 2024-01-10 PROCEDURE — 3017F COLORECTAL CA SCREEN DOC REV: CPT | Performed by: INTERNAL MEDICINE

## 2024-01-10 PROCEDURE — G8417 CALC BMI ABV UP PARAM F/U: HCPCS | Performed by: INTERNAL MEDICINE

## 2024-01-10 RX ORDER — BENZONATATE 100 MG/1
100 CAPSULE ORAL 2 TIMES DAILY PRN
Qty: 20 CAPSULE | Refills: 0 | Status: SHIPPED | OUTPATIENT
Start: 2024-01-10 | End: 2024-01-17

## 2024-01-10 RX ORDER — LISINOPRIL 40 MG/1
40 TABLET ORAL DAILY
Qty: 90 TABLET | Refills: 1 | Status: SHIPPED | OUTPATIENT
Start: 2024-01-10

## 2024-01-10 RX ORDER — HYDRALAZINE HYDROCHLORIDE 100 MG/1
100 TABLET, FILM COATED ORAL 2 TIMES DAILY
Qty: 60 TABLET | Refills: 3 | Status: SHIPPED | OUTPATIENT
Start: 2024-01-10

## 2024-01-10 RX ORDER — NIFEDIPINE 60 MG/1
60 TABLET, EXTENDED RELEASE ORAL DAILY
Qty: 90 TABLET | Refills: 1 | Status: SHIPPED | OUTPATIENT
Start: 2024-01-10

## 2024-01-10 RX ORDER — AZITHROMYCIN 250 MG/1
250 TABLET, FILM COATED ORAL DAILY
Qty: 10 TABLET | Refills: 0 | Status: SHIPPED | OUTPATIENT
Start: 2024-01-10 | End: 2024-01-20

## 2024-01-10 RX ORDER — HYDROCHLOROTHIAZIDE 25 MG/1
25 TABLET ORAL EVERY MORNING
Qty: 90 TABLET | Refills: 1 | Status: SHIPPED | OUTPATIENT
Start: 2024-01-10

## 2024-01-10 SDOH — ECONOMIC STABILITY: FOOD INSECURITY: WITHIN THE PAST 12 MONTHS, THE FOOD YOU BOUGHT JUST DIDN'T LAST AND YOU DIDN'T HAVE MONEY TO GET MORE.: NEVER TRUE

## 2024-01-10 SDOH — ECONOMIC STABILITY: FOOD INSECURITY: WITHIN THE PAST 12 MONTHS, YOU WORRIED THAT YOUR FOOD WOULD RUN OUT BEFORE YOU GOT MONEY TO BUY MORE.: NEVER TRUE

## 2024-01-10 SDOH — ECONOMIC STABILITY: INCOME INSECURITY: HOW HARD IS IT FOR YOU TO PAY FOR THE VERY BASICS LIKE FOOD, HOUSING, MEDICAL CARE, AND HEATING?: NOT HARD AT ALL

## 2024-01-10 ASSESSMENT — PATIENT HEALTH QUESTIONNAIRE - PHQ9
SUM OF ALL RESPONSES TO PHQ QUESTIONS 1-9: 0
SUM OF ALL RESPONSES TO PHQ QUESTIONS 1-9: 0
1. LITTLE INTEREST OR PLEASURE IN DOING THINGS: 0
2. FEELING DOWN, DEPRESSED OR HOPELESS: 0
SUM OF ALL RESPONSES TO PHQ9 QUESTIONS 1 & 2: 0
SUM OF ALL RESPONSES TO PHQ QUESTIONS 1-9: 0
SUM OF ALL RESPONSES TO PHQ QUESTIONS 1-9: 0

## 2024-01-10 ASSESSMENT — ANXIETY QUESTIONNAIRES
GAD7 TOTAL SCORE: 0
IF YOU CHECKED OFF ANY PROBLEMS ON THIS QUESTIONNAIRE, HOW DIFFICULT HAVE THESE PROBLEMS MADE IT FOR YOU TO DO YOUR WORK, TAKE CARE OF THINGS AT HOME, OR GET ALONG WITH OTHER PEOPLE: NOT DIFFICULT AT ALL
4. TROUBLE RELAXING: 0
2. NOT BEING ABLE TO STOP OR CONTROL WORRYING: 0
6. BECOMING EASILY ANNOYED OR IRRITABLE: 0
7. FEELING AFRAID AS IF SOMETHING AWFUL MIGHT HAPPEN: 0
1. FEELING NERVOUS, ANXIOUS, OR ON EDGE: 0
5. BEING SO RESTLESS THAT IT IS HARD TO SIT STILL: 0
3. WORRYING TOO MUCH ABOUT DIFFERENT THINGS: 0

## 2024-01-10 NOTE — PROGRESS NOTES
Chief Complaint   Patient presents with    3 Month Follow-Up    Cough     HPI:  Tracie Umana is a 48 y.o. female with significant medical history below presents for 3 month follow-Up.  Blood pressure reading is not at goal. Denies headaches, dizziness, shortness of breath.  Patient says she has difficulty getting refills from the pharmacy. She is requesting refill on all her medication.  Also, she has additional complaints of cough, now productive of yellow sputum, no fever/chills, no wheezing.    Review of Systems  As per hpi    Past Medical History:   Diagnosis Date    Allergy, unspecified not elsewhere classified     Anemia     Ankle sprain 03/2009    bilateral.  Dr. Laureano    Asthma childhood    Breast mass 5/5/09    Left.   benign.      Chickenpox childhood    Ectopic pregnancy 04/2010    Right.  Dr. Cristy Garcia    Essential hypertension, benign     Menorrhagia 08/2015    Dr. Stephanie Jenkins    Ovarian cyst 2011    R.  Dr. Cristy Garcai    Pure hypercholesterolemia 05/2009    Thrombocytosis 05/2009    Tubal pregnancy 06/2011    Dr Cristy Garcia     Vitamin D deficiency 05/2009     Past Surgical History:   Procedure Laterality Date    DILATION AND CURETTAGE      DILATION AND CURETTAGE OF UTERUS  08/11/2015    Dr. Stephanie Jenkins.  due to heavy vag bleeding and thick endometrial stripe.    SALPINGO-OOPHORECTOMY Right 04/2010    Right.  due to ectopic pregnancy.  Dr. Cirsty Garcia.    TUBAL LIGATION  2002    Dr. Stephanie Jenkins     Social History     Socioeconomic History    Marital status:      Spouse name: None    Number of children: None    Years of education: None    Highest education level: None   Tobacco Use    Smoking status: Never    Smokeless tobacco: Never   Vaping Use    Vaping Use: Never used   Substance and Sexual Activity    Alcohol use: No    Drug use: No    Sexual activity: Yes     Birth control/protection: Surgical     Comment: TL     Social Determinants of Health     Financial

## 2024-01-10 NOTE — PROGRESS NOTES
Chief Complaint   Patient presents with    3 Month Follow-Up    Cough     1. Have you been to the ER, urgent care clinic since your last visit?  Hospitalized since your last visit?No    2. Have you seen or consulted any other health care providers outside of the [facility] since your last visit?  Include any pap smears or colon screening. No

## 2024-01-14 DIAGNOSIS — I10 HYPERTENSION GOAL BP (BLOOD PRESSURE) < 130/80: ICD-10-CM

## 2024-01-15 RX ORDER — HYDRALAZINE HYDROCHLORIDE 50 MG/1
50 TABLET, FILM COATED ORAL 2 TIMES DAILY
Qty: 60 TABLET | Refills: 3 | OUTPATIENT
Start: 2024-01-15

## 2024-01-15 NOTE — TELEPHONE ENCOUNTER
Apresoline 100mg was sent on 1/10/24 for #60 with 3 refills      For Pharmacy Admin Tracking Only    Program: Medication Refill  CPA in place:    Recommendation Provided To:   Intervention Detail: Discontinued Rx: 1, reason: Therapy Complete  Intervention Accepted By:   Gap Closed?:    Time Spent (min): 5

## 2024-01-17 DIAGNOSIS — I10 ESSENTIAL HYPERTENSION, BENIGN: ICD-10-CM

## 2024-01-17 DIAGNOSIS — E78.00 PURE HYPERCHOLESTEROLEMIA: ICD-10-CM

## 2024-01-17 DIAGNOSIS — E03.9 HYPOTHYROIDISM, UNSPECIFIED TYPE: ICD-10-CM

## 2024-01-17 DIAGNOSIS — E55.9 VITAMIN D DEFICIENCY: ICD-10-CM

## 2024-01-17 DIAGNOSIS — I10 HYPERTENSION, WELL CONTROLLED: ICD-10-CM

## 2024-01-17 DIAGNOSIS — R73.03 BORDERLINE DIABETES MELLITUS: ICD-10-CM

## 2024-01-17 DIAGNOSIS — D50.9 HYPOCHROMIC MICROCYTIC ANEMIA: ICD-10-CM

## 2024-01-18 LAB
25(OH)D3 SERPL-MCNC: 27.7 NG/ML (ref 30–100)
ALBUMIN SERPL-MCNC: 3.3 G/DL (ref 3.5–5)
ALBUMIN/GLOB SERPL: 0.8 (ref 1.1–2.2)
ALP SERPL-CCNC: 101 U/L (ref 45–117)
ALT SERPL-CCNC: 16 U/L (ref 12–78)
ANION GAP SERPL CALC-SCNC: 8 MMOL/L (ref 5–15)
AST SERPL-CCNC: 13 U/L (ref 15–37)
BASOPHILS # BLD: 0 K/UL (ref 0–0.1)
BASOPHILS NFR BLD: 0 % (ref 0–1)
BILIRUB SERPL-MCNC: 0.3 MG/DL (ref 0.2–1)
BUN SERPL-MCNC: 12 MG/DL (ref 6–20)
BUN/CREAT SERPL: 9 (ref 12–20)
CALCIUM SERPL-MCNC: 8.9 MG/DL (ref 8.5–10.1)
CHLORIDE SERPL-SCNC: 105 MMOL/L (ref 97–108)
CHOLEST SERPL-MCNC: 232 MG/DL
CO2 SERPL-SCNC: 26 MMOL/L (ref 21–32)
CREAT SERPL-MCNC: 1.27 MG/DL (ref 0.55–1.02)
DIFFERENTIAL METHOD BLD: ABNORMAL
EOSINOPHIL # BLD: 0.6 K/UL (ref 0–0.4)
EOSINOPHIL NFR BLD: 5 % (ref 0–7)
ERYTHROCYTE [DISTWIDTH] IN BLOOD BY AUTOMATED COUNT: 18.6 % (ref 11.5–14.5)
EST. AVERAGE GLUCOSE BLD GHB EST-MCNC: ABNORMAL MG/DL
GLOBULIN SER CALC-MCNC: 4.4 G/DL (ref 2–4)
GLUCOSE SERPL-MCNC: 156 MG/DL (ref 65–100)
HBA1C MFR BLD: <3.8 % (ref 4–5.6)
HCT VFR BLD AUTO: 29.9 % (ref 35–47)
HDLC SERPL-MCNC: 107 MG/DL
HDLC SERPL: 2.2 (ref 0–5)
HGB BLD-MCNC: 8.1 G/DL (ref 11.5–16)
IMM GRANULOCYTES # BLD AUTO: 0 K/UL (ref 0–0.04)
IMM GRANULOCYTES NFR BLD AUTO: 0 % (ref 0–0.5)
LDLC SERPL CALC-MCNC: 107.6 MG/DL (ref 0–100)
LYMPHOCYTES # BLD: 1.8 K/UL (ref 0.8–3.5)
LYMPHOCYTES NFR BLD: 15 % (ref 12–49)
MCHC RBC AUTO-ENTMCNC: 27.1 G/DL (ref 30–36.5)
MCV RBC AUTO: 79.3 FL (ref 80–99)
MONOCYTES # BLD: 0.6 K/UL (ref 0–1)
MONOCYTES NFR BLD: 5 % (ref 5–13)
NEUTS SEG # BLD: 8.8 K/UL (ref 1.8–8)
NEUTS SEG NFR BLD: 75 % (ref 32–75)
NRBC # BLD: 0 K/UL (ref 0–0.01)
NRBC BLD-RTO: 0 PER 100 WBC
PLATELET # BLD AUTO: 482 K/UL (ref 150–400)
PMV BLD AUTO: 11.3 FL (ref 8.9–12.9)
POTASSIUM SERPL-SCNC: 3.6 MMOL/L (ref 3.5–5.1)
PROT SERPL-MCNC: 7.7 G/DL (ref 6.4–8.2)
RBC # BLD AUTO: 3.77 M/UL (ref 3.8–5.2)
RBC MORPH BLD: ABNORMAL
SODIUM SERPL-SCNC: 139 MMOL/L (ref 136–145)
TRIGL SERPL-MCNC: 87 MG/DL
TSH SERPL DL<=0.05 MIU/L-ACNC: 1.06 UIU/ML (ref 0.36–3.74)
VLDLC SERPL CALC-MCNC: 17.4 MG/DL
WBC # BLD AUTO: 11.8 K/UL (ref 3.6–11)

## 2024-01-24 ENCOUNTER — OFFICE VISIT (OUTPATIENT)
Age: 49
End: 2024-01-24
Payer: COMMERCIAL

## 2024-01-24 VITALS
RESPIRATION RATE: 20 BRPM | HEART RATE: 90 BPM | WEIGHT: 210 LBS | HEIGHT: 62 IN | SYSTOLIC BLOOD PRESSURE: 131 MMHG | OXYGEN SATURATION: 98 % | DIASTOLIC BLOOD PRESSURE: 75 MMHG | BODY MASS INDEX: 38.64 KG/M2 | TEMPERATURE: 97.7 F

## 2024-01-24 DIAGNOSIS — D50.9 HYPOCHROMIC MICROCYTIC ANEMIA: ICD-10-CM

## 2024-01-24 DIAGNOSIS — J30.89 ALLERGIC RHINITIS DUE TO OTHER ALLERGIC TRIGGER, UNSPECIFIED SEASONALITY: ICD-10-CM

## 2024-01-24 DIAGNOSIS — E78.00 PURE HYPERCHOLESTEROLEMIA: ICD-10-CM

## 2024-01-24 DIAGNOSIS — E55.9 VITAMIN D DEFICIENCY, UNSPECIFIED: ICD-10-CM

## 2024-01-24 DIAGNOSIS — I10 HYPERTENSION, WELL CONTROLLED: Primary | ICD-10-CM

## 2024-01-24 PROCEDURE — G8484 FLU IMMUNIZE NO ADMIN: HCPCS | Performed by: INTERNAL MEDICINE

## 2024-01-24 PROCEDURE — 1036F TOBACCO NON-USER: CPT | Performed by: INTERNAL MEDICINE

## 2024-01-24 PROCEDURE — 3075F SYST BP GE 130 - 139MM HG: CPT | Performed by: INTERNAL MEDICINE

## 2024-01-24 PROCEDURE — G8417 CALC BMI ABV UP PARAM F/U: HCPCS | Performed by: INTERNAL MEDICINE

## 2024-01-24 PROCEDURE — 3017F COLORECTAL CA SCREEN DOC REV: CPT | Performed by: INTERNAL MEDICINE

## 2024-01-24 PROCEDURE — G8427 DOCREV CUR MEDS BY ELIG CLIN: HCPCS | Performed by: INTERNAL MEDICINE

## 2024-01-24 PROCEDURE — 3078F DIAST BP <80 MM HG: CPT | Performed by: INTERNAL MEDICINE

## 2024-01-24 PROCEDURE — 99214 OFFICE O/P EST MOD 30 MIN: CPT | Performed by: INTERNAL MEDICINE

## 2024-01-24 RX ORDER — FERROUS SULFATE 325(65) MG
325 TABLET ORAL
Qty: 30 TABLET | Refills: 3 | Status: SHIPPED | OUTPATIENT
Start: 2024-01-24

## 2024-01-24 RX ORDER — ALBUTEROL SULFATE 90 UG/1
2 AEROSOL, METERED RESPIRATORY (INHALATION) EVERY 4 HOURS PRN
Qty: 6.7 G | Refills: 3 | Status: SHIPPED | OUTPATIENT
Start: 2024-01-24

## 2024-01-24 RX ORDER — ACETAMINOPHEN 160 MG
1 TABLET,DISINTEGRATING ORAL DAILY
Qty: 90 CAPSULE | Refills: 1 | Status: SHIPPED | OUTPATIENT
Start: 2024-01-24

## 2024-01-24 NOTE — PROGRESS NOTES
Overall Financial Resource Strain (CARDIA)     Difficulty of Paying Living Expenses: Not hard at all   Food Insecurity: No Food Insecurity (1/10/2024)    Hunger Vital Sign     Worried About Running Out of Food in the Last Year: Never true     Ran Out of Food in the Last Year: Never true   Transportation Needs: Unknown (1/10/2024)    PRAPARE - Transportation     Lack of Transportation (Non-Medical): No   Housing Stability: Unknown (1/10/2024)    Housing Stability Vital Sign     Unstable Housing in the Last Year: No     Family History   Problem Relation Age of Onset    Diabetes Father         type ll    Breast Cancer Sister 65    Breast Cancer Maternal Aunt 72    Anesth Problems Neg Hx      Current Outpatient Medications   Medication Sig Dispense Refill    lisinopril (PRINIVIL;ZESTRIL) 40 MG tablet Take 1 tablet by mouth daily 90 tablet 1    hydrALAZINE (APRESOLINE) 100 MG tablet Take 1 tablet by mouth 2 times daily 60 tablet 3    NIFEdipine (PROCARDIA XL) 60 MG extended release tablet Take 1 tablet by mouth daily 90 tablet 1    hydroCHLOROthiazide (HYDRODIURIL) 25 MG tablet Take 1 tablet by mouth every morning 90 tablet 1    albuterol sulfate HFA (PROVENTIL;VENTOLIN;PROAIR) 108 (90 Base) MCG/ACT inhaler INHALE 2 PUFFS INTO THE LUNGS EVERY 4 HOURS AS NEEDED FOR WHEEZING 6.7 g 3    loratadine (CLARITIN) 10 MG tablet Take 1 tablet by mouth daily 90 tablet 1    Cholecalciferol (VITAMIN D3) 50 MCG (2000 UT) CAPS Take 1 capsule by mouth daily 90 capsule 1    ferrous sulfate (IRON 325) 325 (65 Fe) MG tablet Take by mouth every morning (before breakfast)       No current facility-administered medications for this visit.     Allergies   Allergen Reactions    Sulfamethoxazole-Trimethoprim Other (See Comments)     GI Upset     Objective:  /75   Pulse 90   Temp 97.7 °F (36.5 °C) (Temporal)   Resp 20   Ht 1.575 m (5' 2\")   Wt 95.3 kg (210 lb)   SpO2 98%   BMI 38.41 kg/m²   Physical Exam:   General appearance - alert,

## 2024-02-07 ENCOUNTER — OFFICE VISIT (OUTPATIENT)
Age: 49
End: 2024-02-07
Payer: COMMERCIAL

## 2024-02-07 VITALS — BODY MASS INDEX: 38.96 KG/M2 | WEIGHT: 213 LBS | SYSTOLIC BLOOD PRESSURE: 172 MMHG | DIASTOLIC BLOOD PRESSURE: 94 MMHG

## 2024-02-07 DIAGNOSIS — N95.1 VASOMOTOR SYMPTOMS DUE TO MENOPAUSE: ICD-10-CM

## 2024-02-07 DIAGNOSIS — N95.1 PERIMENOPAUSE: Primary | ICD-10-CM

## 2024-02-07 PROCEDURE — G8417 CALC BMI ABV UP PARAM F/U: HCPCS | Performed by: OBSTETRICS & GYNECOLOGY

## 2024-02-07 PROCEDURE — 3017F COLORECTAL CA SCREEN DOC REV: CPT | Performed by: OBSTETRICS & GYNECOLOGY

## 2024-02-07 PROCEDURE — 3077F SYST BP >= 140 MM HG: CPT | Performed by: OBSTETRICS & GYNECOLOGY

## 2024-02-07 PROCEDURE — 99213 OFFICE O/P EST LOW 20 MIN: CPT | Performed by: OBSTETRICS & GYNECOLOGY

## 2024-02-07 PROCEDURE — G8427 DOCREV CUR MEDS BY ELIG CLIN: HCPCS | Performed by: OBSTETRICS & GYNECOLOGY

## 2024-02-07 PROCEDURE — 3080F DIAST BP >= 90 MM HG: CPT | Performed by: OBSTETRICS & GYNECOLOGY

## 2024-02-07 PROCEDURE — G8484 FLU IMMUNIZE NO ADMIN: HCPCS | Performed by: OBSTETRICS & GYNECOLOGY

## 2024-02-07 PROCEDURE — 1036F TOBACCO NON-USER: CPT | Performed by: OBSTETRICS & GYNECOLOGY

## 2024-02-07 NOTE — PROGRESS NOTES
Problem Visit    Tracie Umana is a 48 y.o. presenting for problem visit. Here to follow up on hot flashes and night sweats. Never started Estrovera. States she never got a phone call from the company. Had a cycle this month that lasted for 5 days.  Colonoscopy scheduled for .    Quitting her job at [employer].  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  She plans to travel more with this job.        Ob/Gyn Hx:    LMP- 24  Menses- irregular 5-day cycle this month.  Contraception-Tubal ligation nothing additional  SA-yes        Past Medical History:   Diagnosis Date    Allergy, unspecified not elsewhere classified     Anemia     Ankle sprain 2009    bilateral.  Dr. Georgi Pang childhood    Breast mass 09    Left.   benign.      Chickenpox childhood    Ectopic pregnancy 2010    Right.  Dr. Cristy Garcia    Essential hypertension, benign     Menorrhagia 2015    Dr. Stephanie Jenkins    Ovarian cyst     R.  Dr. Cristy Garcia    Pure hypercholesterolemia 2009    Thrombocytosis 2009    Tubal pregnancy 2011    Dr Cristy Garcia     Vitamin D deficiency 2009       Past Surgical History:   Procedure Laterality Date    DILATION AND CURETTAGE      DILATION AND CURETTAGE OF UTERUS  2015    Dr. Stephanie Jenkins.  due to heavy vag bleeding and thick endometrial stripe.    SALPINGO-OOPHORECTOMY Right 2010    Right.  due to ectopic pregnancy.  Dr. Cristy Garcia.    TUBAL LIGATION      Dr. Stephanie Jenkins       Family History   Problem Relation Age of Onset    Diabetes Father         type ll    Breast Cancer Sister 65    Breast Cancer Maternal Aunt 72    Anesth Problems Neg Hx        Social History     Socioeconomic History    Marital status:      Spouse name: Not on file    Number of children: Not on file    Years of education: Not on file    Highest education level: Not on file   Occupational History    Not on

## 2024-04-01 DIAGNOSIS — J30.89 ALLERGIC RHINITIS DUE TO OTHER ALLERGIC TRIGGER, UNSPECIFIED SEASONALITY: ICD-10-CM

## 2024-04-02 ENCOUNTER — ANESTHESIA (OUTPATIENT)
Facility: HOSPITAL | Age: 49
End: 2024-04-02
Payer: COMMERCIAL

## 2024-04-02 ENCOUNTER — ANESTHESIA EVENT (OUTPATIENT)
Facility: HOSPITAL | Age: 49
End: 2024-04-02
Payer: COMMERCIAL

## 2024-04-02 ENCOUNTER — HOSPITAL ENCOUNTER (OUTPATIENT)
Facility: HOSPITAL | Age: 49
Setting detail: OUTPATIENT SURGERY
Discharge: HOME | End: 2024-04-02
Attending: SPECIALIST | Admitting: SPECIALIST
Payer: COMMERCIAL

## 2024-04-02 VITALS
OXYGEN SATURATION: 96 % | RESPIRATION RATE: 18 BRPM | HEART RATE: 92 BPM | HEIGHT: 62 IN | SYSTOLIC BLOOD PRESSURE: 171 MMHG | TEMPERATURE: 98.2 F | WEIGHT: 205 LBS | DIASTOLIC BLOOD PRESSURE: 93 MMHG | BODY MASS INDEX: 37.73 KG/M2

## 2024-04-02 PROCEDURE — 2500000003 HC RX 250 WO HCPCS: Performed by: NURSE ANESTHETIST, CERTIFIED REGISTERED

## 2024-04-02 PROCEDURE — 3700000000 HC ANESTHESIA ATTENDED CARE: Performed by: SPECIALIST

## 2024-04-02 PROCEDURE — 7100000011 HC PHASE II RECOVERY - ADDTL 15 MIN: Performed by: SPECIALIST

## 2024-04-02 PROCEDURE — 3600007502: Performed by: SPECIALIST

## 2024-04-02 PROCEDURE — 6370000000 HC RX 637 (ALT 250 FOR IP): Performed by: SPECIALIST

## 2024-04-02 PROCEDURE — 2580000003 HC RX 258: Performed by: NURSE ANESTHETIST, CERTIFIED REGISTERED

## 2024-04-02 PROCEDURE — 3600007512: Performed by: SPECIALIST

## 2024-04-02 PROCEDURE — 6360000002 HC RX W HCPCS: Performed by: NURSE ANESTHETIST, CERTIFIED REGISTERED

## 2024-04-02 PROCEDURE — 3700000001 HC ADD 15 MINUTES (ANESTHESIA): Performed by: SPECIALIST

## 2024-04-02 PROCEDURE — 7100000010 HC PHASE II RECOVERY - FIRST 15 MIN: Performed by: SPECIALIST

## 2024-04-02 RX ORDER — SIMETHICONE 40MG/0.6ML
SUSPENSION, DROPS(FINAL DOSAGE FORM)(ML) ORAL PRN
Status: DISCONTINUED | OUTPATIENT
Start: 2024-04-02 | End: 2024-04-02 | Stop reason: ALTCHOICE

## 2024-04-02 RX ORDER — SODIUM CHLORIDE 0.9 % (FLUSH) 0.9 %
5-40 SYRINGE (ML) INJECTION EVERY 12 HOURS SCHEDULED
Status: DISCONTINUED | OUTPATIENT
Start: 2024-04-02 | End: 2024-04-02 | Stop reason: HOSPADM

## 2024-04-02 RX ORDER — SODIUM CHLORIDE 0.9 % (FLUSH) 0.9 %
5-40 SYRINGE (ML) INJECTION PRN
Status: DISCONTINUED | OUTPATIENT
Start: 2024-04-02 | End: 2024-04-02 | Stop reason: HOSPADM

## 2024-04-02 RX ORDER — SODIUM CHLORIDE 9 MG/ML
25 INJECTION, SOLUTION INTRAVENOUS PRN
Status: DISCONTINUED | OUTPATIENT
Start: 2024-04-02 | End: 2024-04-02 | Stop reason: HOSPADM

## 2024-04-02 RX ORDER — ALBUTEROL SULFATE 90 UG/1
2 AEROSOL, METERED RESPIRATORY (INHALATION) EVERY 4 HOURS PRN
Qty: 6.7 G | Refills: 5 | Status: SHIPPED | OUTPATIENT
Start: 2024-04-02

## 2024-04-02 RX ORDER — SODIUM CHLORIDE 9 MG/ML
INJECTION, SOLUTION INTRAVENOUS CONTINUOUS
Status: DISCONTINUED | OUTPATIENT
Start: 2024-04-02 | End: 2024-04-02 | Stop reason: HOSPADM

## 2024-04-02 RX ORDER — SODIUM CHLORIDE, SODIUM LACTATE, POTASSIUM CHLORIDE, CALCIUM CHLORIDE 600; 310; 30; 20 MG/100ML; MG/100ML; MG/100ML; MG/100ML
INJECTION, SOLUTION INTRAVENOUS CONTINUOUS PRN
Status: DISCONTINUED | OUTPATIENT
Start: 2024-04-02 | End: 2024-04-02 | Stop reason: SDUPTHER

## 2024-04-02 RX ADMIN — PROPOFOL 30 MG: 10 INJECTION, EMULSION INTRAVENOUS at 10:03

## 2024-04-02 RX ADMIN — LIDOCAINE HYDROCHLORIDE 50 MG: 20 INJECTION, SOLUTION EPIDURAL; INFILTRATION; INTRACAUDAL; PERINEURAL at 10:00

## 2024-04-02 RX ADMIN — PROPOFOL 20 MG: 10 INJECTION, EMULSION INTRAVENOUS at 10:02

## 2024-04-02 RX ADMIN — PROPOFOL 30 MG: 10 INJECTION, EMULSION INTRAVENOUS at 10:06

## 2024-04-02 RX ADMIN — PROPOFOL 40 MG: 10 INJECTION, EMULSION INTRAVENOUS at 10:09

## 2024-04-02 RX ADMIN — PROPOFOL 80 MG: 10 INJECTION, EMULSION INTRAVENOUS at 10:00

## 2024-04-02 RX ADMIN — SODIUM CHLORIDE, SODIUM LACTATE, POTASSIUM CHLORIDE, AND CALCIUM CHLORIDE: 600; 310; 30; 20 INJECTION, SOLUTION INTRAVENOUS at 09:49

## 2024-04-02 RX ADMIN — PROPOFOL 30 MG: 10 INJECTION, EMULSION INTRAVENOUS at 10:12

## 2024-04-02 NOTE — OP NOTE
[facility]  [address], [place] [address]                 Colonoscopy Procedure Note    Indications:   See Preoperative Diagnosis above  Referring Physician: Dao Momin MD  Anesthesia/Sedation: MAC anesthesia Propofol  Endoscopist:  Dr. Attila Marshall  Assistant:  Circulator: Terri Rivas RN  Endoscopy Technician: Denys Lisa  Preoperative diagnosis: Screen for colon cancer [Z12.11]    Procedure in Detail:  Informed consent was obtained for the procedure, including sedation.  Risks of perforation, hemorrhage, adverse drug reaction, and aspiration were discussed. The patient was placed in the left lateral decubitus position.  Based on the pre-procedure assessment, including review of the patient's medical history, medications, allergies, and review of systems, she had been deemed to be an appropriate candidate for  sedation; she was therefore sedated with the medications listed above.   The patient was monitored continuously with ECG tracing, pulse oximetry, blood pressure monitoring, and direct observations.      A rectal examination was performed. The [id number] was inserted into the rectum and advanced under direct vision to the cecum, which was identified by the ileocecal valve and appendiceal orifice.  The quality of the colonic preparation was fair.  A careful inspection was made as the colonoscope was withdrawn, including a retroflexed view of the rectum; findings and interventions are described below.  Appropriate photodocumentation was obtained.    Findings:  Rectum: normal  Sigmoid: normal  Descending Colon: normal  Transverse Colon: normal  Ascending Colon: normal  Cecum: normal    Specimens:    * No specimens in log *    EBL: None    Complications: None; patient tolerated the procedure well.    Recommendations:     - Repeat colonoscopy in 5 years.      Signed By: Attila Marshall MD                        April 2, 2024

## 2024-04-02 NOTE — ANESTHESIA POSTPROCEDURE EVALUATION
Department of Anesthesiology  Postprocedure Note    Patient: Tracie Umana  MRN: [id number]  YOB: 1975  Date of evaluation: 4/2/2024    Procedure Summary       Date: 04/02/24 Room / Location: [facility] ENDO 04 / [facility] ENDOSCOPY    Anesthesia Start: 0949 Anesthesia Stop: 1018    Procedure: COLONOSCOPY DIAGNOSTIC (Lower GI Region) Diagnosis:       Screen for colon cancer      (Screen for colon cancer [Z12.11])    Surgeons: Attila Marshall MD Responsible Provider: David Bruce MD    Anesthesia Type: MAC ASA Status: 2            Anesthesia Type: No value filed.    Alberto Phase I: Alberto Score: 10    Alberto Phase II:      Anesthesia Post Evaluation    Patient location during evaluation: bedside  Nausea & Vomiting: no nausea  Cardiovascular status: blood pressure returned to baseline  Respiratory status: acceptable    No notable events documented.

## 2024-04-02 NOTE — DISCHARGE INSTRUCTIONS
Tracie Umana  [id number]  1975    COLON DISCHARGE INSTRUCTIONS  Discomfort:  Redness at IV site- apply warm compress to area; if redness or soreness persist- contact your physician  There may be a slight amount of blood passed from the rectum  Gaseous discomfort- walking, belching will help relieve any discomfort    DIET:   High fiber diet.   - however -  remember your colon is empty and a heavy meal will produce gas.   Avoid these foods:  vegetables, fried / greasy foods, carbonated drinks for today.   You may not drink alcoholic beverages for at least 12 hours    MEDICATIONS:   Regarding Aspirin or Nonsteroidal medications, please see below.    ACTIVITY:  You may resume your normal daily activities it is recommended that you spend the remainder of the day resting -  avoid any strenuous activity.  You may not operate a vehicle for 12 hours  You may not engage in an occupation involving machinery or appliances for rest of today  Avoid making any critical decisions for at least 24 hour    CALL M.D.  ANY SIGN OF:  Increasing pain, nausea, vomiting  Abdominal distension (swelling)  New increased bleeding (oral or rectal)  Fever (chills)  Pain in chest area  Bloody discharge from nose or mouth  Shortness of breath    You may take Tylenol as needed for pain. You may  take any Advil, Aspirin, Ibuprofen, Motrin, Aleve, or Goody’s for 10 days,      Post procedure diagnosis: Normal colon  Post-procedure recommendations: colon in 5 yrs    Follow-up Instructions:   Call Dr. Marshall  Results of procedure / biopsy in 10 days if biopsies were done  Telephone #  [phone]

## 2024-04-02 NOTE — H&P
Pre-endoscopy H and P for Colonoscopy    The patient was seen and examined.Date of last colonoscopy: none, Polyps  No      The airway was assessed and documented.  The problem list, past medical history, and medications were reviewed.     Patient Active Problem List   Diagnosis    Iron deficiency anemia    Vitamin D deficiency    Seasonal allergic rhinitis    Ovarian cyst    Essential hypertension, benign    ETD (eustachian tube dysfunction)    Pure hypercholesterolemia    Leukocytosis    Thrombocytosis     Social History     Socioeconomic History    Marital status:      Spouse name: Not on file    Number of children: Not on file    Years of education: Not on file    Highest education level: Not on file   Occupational History    Not on file   Tobacco Use    Smoking status: Never    Smokeless tobacco: Never   Vaping Use    Vaping Use: Never used   Substance and Sexual Activity    Alcohol use: No    Drug use: No    Sexual activity: Yes     Partners: Male     Birth control/protection: Surgical     Comment: TL   Other Topics Concern    Not on file   Social History Narrative    Not on file     Social Determinants of Health     Financial Resource Strain: Low Risk  (1/10/2024)    Overall Financial Resource Strain (CARDIA)     Difficulty of Paying Living Expenses: Not hard at all   Food Insecurity: No Food Insecurity (1/10/2024)    Hunger Vital Sign     Worried About Running Out of Food in the Last Year: Never true     Ran Out of Food in the Last Year: Never true   Transportation Needs: Unknown (1/10/2024)    PRAPARE - Transportation     Lack of Transportation (Medical): Not on file     Lack of Transportation (Non-Medical): No   Physical Activity: Not on file   Stress: Not on file   Social Connections: Not on file   Intimate Partner Violence: Not on file   Housing Stability: Unknown (1/10/2024)    Housing Stability Vital Sign     Unable to Pay for Housing in the Last Year: Not on file     Number of Places Lived in

## 2024-04-02 NOTE — PROGRESS NOTES

## 2024-04-02 NOTE — TELEPHONE ENCOUNTER
Last appointment: 1/24/24  Next appointment: 4/25/24  Previous refill encounter(s): 1/24/24 #1 with 3 refills    Requested Prescriptions     Pending Prescriptions Disp Refills    albuterol sulfate HFA (PROVENTIL;VENTOLIN;PROAIR) 108 (90 Base) MCG/ACT inhaler [Pharmacy Med Name: ALBUTEROL HFA INH (200 PUFFS) 6.7GM] 6.7 g 5     Sig: INHALE 2 PUFFS INTO THE LUNGS EVERY 4 HOURS AS NEEDED FOR WHEEZING         For Pharmacy Admin Tracking Only    Program: Medication Refill  CPA in place:    Recommendation Provided To:   Intervention Detail: New Rx: 1, reason: Patient Preference  Intervention Accepted By:   Gap Closed?:    Time Spent (min): 5

## 2024-04-02 NOTE — ANESTHESIA PRE PROCEDURE
Department of Anesthesiology  Preprocedure Note       Name:  Tracie Umana   Age:  48 y.o.  :  1975                                          MRN:  [id number]         Date:  2024      Surgeon: Surgeon(s):  Attila Marshall MD    Procedure: Procedure(s):  COLONOSCOPY DIAGNOSTIC    Medications prior to admission:   Prior to Admission medications    Medication Sig Start Date End Date Taking? Authorizing Provider   albuterol sulfate HFA (PROVENTIL;VENTOLIN;PROAIR) 108 (90 Base) MCG/ACT inhaler INHALE 2 PUFFS INTO THE LUNGS EVERY 4 HOURS AS NEEDED FOR WHEEZING 24   Dao Momin MD   ferrous sulfate (IRON 325) 325 (65 Fe) MG tablet Take 1 tablet by mouth every morning (before breakfast) Take by mouth every morning (before breakfast) 24   Dao Momin MD   Cholecalciferol (VITAMIN D3) 50 MCG (2000 UT) CAPS Take 1 capsule by mouth daily 24   Dao Momin MD   lisinopril (PRINIVIL;ZESTRIL) 40 MG tablet Take 1 tablet by mouth daily 1/10/24   Dao Momin MD   hydrALAZINE (APRESOLINE) 100 MG tablet Take 1 tablet by mouth 2 times daily 1/10/24   Dao Momin MD   NIFEdipine (PROCARDIA XL) 60 MG extended release tablet Take 1 tablet by mouth daily 1/10/24   Dao Momin MD   hydroCHLOROthiazide (HYDRODIURIL) 25 MG tablet Take 1 tablet by mouth every morning  Patient not taking: Reported on 2024 1/10/24   Dao Momin MD   loratadine (CLARITIN) 10 MG tablet Take 1 tablet by mouth daily  Patient not taking: Reported on 2024 10/4/23   Dao Momin MD       Current medications:    Current Facility-Administered Medications   Medication Dose Route Frequency Provider Last Rate Last Admin   • 0.9 % sodium chloride infusion   IntraVENous Continuous tAtila Marshall MD       • sodium chloride flush 0.9 % injection 5-40 mL  5-40 mL IntraVENous 2 times per day Attila Marshall MD       • sodium chloride flush 0.9 % injection 5-40 mL  5-40 mL IntraVENous PRN Rolando

## 2024-04-25 ENCOUNTER — OFFICE VISIT (OUTPATIENT)
Age: 49
End: 2024-04-25
Payer: COMMERCIAL

## 2024-04-25 VITALS
BODY MASS INDEX: 38.28 KG/M2 | HEIGHT: 62 IN | TEMPERATURE: 97.3 F | DIASTOLIC BLOOD PRESSURE: 96 MMHG | RESPIRATION RATE: 22 BRPM | OXYGEN SATURATION: 94 % | WEIGHT: 208 LBS | HEART RATE: 100 BPM | SYSTOLIC BLOOD PRESSURE: 166 MMHG

## 2024-04-25 DIAGNOSIS — I10 ESSENTIAL HYPERTENSION, BENIGN: ICD-10-CM

## 2024-04-25 DIAGNOSIS — E55.9 VITAMIN D DEFICIENCY, UNSPECIFIED: ICD-10-CM

## 2024-04-25 DIAGNOSIS — I10 HYPERTENSION, WELL CONTROLLED: ICD-10-CM

## 2024-04-25 DIAGNOSIS — J20.9 ACUTE BRONCHITIS DUE TO INFECTION: Primary | ICD-10-CM

## 2024-04-25 PROBLEM — J45.909 ASTHMA: Status: ACTIVE | Noted: 2024-04-25

## 2024-04-25 PROCEDURE — 3080F DIAST BP >= 90 MM HG: CPT | Performed by: INTERNAL MEDICINE

## 2024-04-25 PROCEDURE — 3077F SYST BP >= 140 MM HG: CPT | Performed by: INTERNAL MEDICINE

## 2024-04-25 PROCEDURE — 99214 OFFICE O/P EST MOD 30 MIN: CPT | Performed by: INTERNAL MEDICINE

## 2024-04-25 RX ORDER — HYDROCHLOROTHIAZIDE 25 MG/1
25 TABLET ORAL EVERY MORNING
Qty: 90 TABLET | Refills: 1 | Status: SHIPPED | OUTPATIENT
Start: 2024-04-25

## 2024-04-25 RX ORDER — AZITHROMYCIN 250 MG/1
TABLET, FILM COATED ORAL
Qty: 6 TABLET | Refills: 0 | Status: SHIPPED | OUTPATIENT
Start: 2024-04-25 | End: 2024-05-05

## 2024-04-25 RX ORDER — LISINOPRIL 40 MG/1
40 TABLET ORAL DAILY
Qty: 90 TABLET | Refills: 1 | Status: SHIPPED | OUTPATIENT
Start: 2024-04-25

## 2024-04-25 RX ORDER — NIFEDIPINE 60 MG/1
60 TABLET, EXTENDED RELEASE ORAL DAILY
Qty: 90 TABLET | Refills: 1 | Status: SHIPPED | OUTPATIENT
Start: 2024-04-25

## 2024-04-25 RX ORDER — DEXTROMETHORPHAN HYDROBROMIDE AND PROMETHAZINE HYDROCHLORIDE 15; 6.25 MG/5ML; MG/5ML
5 SYRUP ORAL 4 TIMES DAILY PRN
Qty: 240 ML | Refills: 0 | Status: SHIPPED | OUTPATIENT
Start: 2024-04-25 | End: 2024-05-07

## 2024-04-25 RX ORDER — HYDRALAZINE HYDROCHLORIDE 100 MG/1
100 TABLET, FILM COATED ORAL 2 TIMES DAILY
Qty: 60 TABLET | Refills: 3 | Status: SHIPPED | OUTPATIENT
Start: 2024-04-25

## 2024-04-25 RX ORDER — ACETAMINOPHEN 160 MG
2000 TABLET,DISINTEGRATING ORAL DAILY
Qty: 90 CAPSULE | Refills: 1 | Status: SHIPPED | OUTPATIENT
Start: 2024-04-25

## 2024-04-25 ASSESSMENT — PATIENT HEALTH QUESTIONNAIRE - PHQ9
SUM OF ALL RESPONSES TO PHQ9 QUESTIONS 1 & 2: 0
SUM OF ALL RESPONSES TO PHQ QUESTIONS 1-9: 0
2. FEELING DOWN, DEPRESSED OR HOPELESS: NOT AT ALL
1. LITTLE INTEREST OR PLEASURE IN DOING THINGS: NOT AT ALL

## 2024-04-25 NOTE — PROGRESS NOTES
1. Have you been to the ER, urgent care clinic since your last visit?  Hospitalized since your last visit?No    2. Have you seen or consulted any other health care providers outside of the [facility] since your last visit?  Include any pap smears or colon screening. No    
Comment: TL     Social Determinants of Health     Financial Resource Strain: Low Risk  (1/10/2024)    Overall Financial Resource Strain (CARDIA)     Difficulty of Paying Living Expenses: Not hard at all   Food Insecurity: No Food Insecurity (1/10/2024)    Hunger Vital Sign     Worried About Running Out of Food in the Last Year: Never true     Ran Out of Food in the Last Year: Never true   Transportation Needs: Unknown (1/10/2024)    PRAPARE - Transportation     Lack of Transportation (Non-Medical): No   Housing Stability: Unknown (1/10/2024)    Housing Stability Vital Sign     Unstable Housing in the Last Year: No     Family History   Problem Relation Age of Onset    Diabetes Father         type ll    Breast Cancer Sister 65    Breast Cancer Maternal Aunt 72    Anesth Problems Neg Hx      Current Outpatient Medications   Medication Sig Dispense Refill    albuterol sulfate HFA (PROVENTIL;VENTOLIN;PROAIR) 108 (90 Base) MCG/ACT inhaler INHALE 2 PUFFS INTO THE LUNGS EVERY 4 HOURS AS NEEDED FOR WHEEZING 6.7 g 5    ferrous sulfate (IRON 325) 325 (65 Fe) MG tablet Take 1 tablet by mouth every morning (before breakfast) Take by mouth every morning (before breakfast) 30 tablet 3    Cholecalciferol (VITAMIN D3) 50 MCG (2000 UT) CAPS Take 1 capsule by mouth daily 90 capsule 1    lisinopril (PRINIVIL;ZESTRIL) 40 MG tablet Take 1 tablet by mouth daily 90 tablet 1    hydrALAZINE (APRESOLINE) 100 MG tablet Take 1 tablet by mouth 2 times daily 60 tablet 3    NIFEdipine (PROCARDIA XL) 60 MG extended release tablet Take 1 tablet by mouth daily 90 tablet 1    hydroCHLOROthiazide (HYDRODIURIL) 25 MG tablet Take 1 tablet by mouth every morning 90 tablet 1    loratadine (CLARITIN) 10 MG tablet Take 1 tablet by mouth daily 90 tablet 1     No current facility-administered medications for this visit.     Allergies   Allergen Reactions    Sulfamethoxazole-Trimethoprim Other (See Comments)     GI Upset       Objective:  BP (!) 166/96   Pulse

## 2024-07-01 ENCOUNTER — TRANSCRIBE ORDERS (OUTPATIENT)
Facility: HOSPITAL | Age: 49
End: 2024-07-01

## 2024-07-01 ENCOUNTER — HOSPITAL ENCOUNTER (OUTPATIENT)
Facility: HOSPITAL | Age: 49
Discharge: HOME | End: 2024-07-04
Payer: COMMERCIAL

## 2024-07-01 VITALS — HEIGHT: 62 IN | WEIGHT: 190 LBS | BODY MASS INDEX: 34.96 KG/M2

## 2024-07-01 DIAGNOSIS — Z12.31 VISIT FOR SCREENING MAMMOGRAM: Primary | ICD-10-CM

## 2024-07-01 DIAGNOSIS — Z12.31 VISIT FOR SCREENING MAMMOGRAM: ICD-10-CM

## 2024-07-01 PROCEDURE — 77063 BREAST TOMOSYNTHESIS BI: CPT

## 2024-07-07 DIAGNOSIS — J30.89 ALLERGIC RHINITIS DUE TO OTHER ALLERGIC TRIGGER, UNSPECIFIED SEASONALITY: ICD-10-CM

## 2024-07-08 RX ORDER — ALBUTEROL SULFATE 90 UG/1
2 AEROSOL, METERED RESPIRATORY (INHALATION) EVERY 4 HOURS PRN
Qty: 6.7 G | Refills: 5 | Status: SHIPPED | OUTPATIENT
Start: 2024-07-08

## 2024-08-20 DIAGNOSIS — I10 ESSENTIAL HYPERTENSION, BENIGN: ICD-10-CM

## 2024-08-20 DIAGNOSIS — I10 HYPERTENSION, WELL CONTROLLED: ICD-10-CM

## 2024-08-21 RX ORDER — HYDRALAZINE HYDROCHLORIDE 100 MG/1
100 TABLET, FILM COATED ORAL 2 TIMES DAILY
Qty: 60 TABLET | Refills: 3 | Status: SHIPPED | OUTPATIENT
Start: 2024-08-21

## 2024-09-05 ENCOUNTER — OFFICE VISIT (OUTPATIENT)
Age: 49
End: 2024-09-05
Payer: COMMERCIAL

## 2024-09-05 VITALS
HEART RATE: 76 BPM | DIASTOLIC BLOOD PRESSURE: 83 MMHG | WEIGHT: 206 LBS | RESPIRATION RATE: 13 BRPM | HEIGHT: 62 IN | SYSTOLIC BLOOD PRESSURE: 152 MMHG | BODY MASS INDEX: 37.91 KG/M2 | TEMPERATURE: 97.1 F | OXYGEN SATURATION: 98 %

## 2024-09-05 DIAGNOSIS — Z01.419 WELL WOMAN EXAM: Primary | ICD-10-CM

## 2024-09-05 PROCEDURE — 3079F DIAST BP 80-89 MM HG: CPT | Performed by: OBSTETRICS & GYNECOLOGY

## 2024-09-05 PROCEDURE — 3077F SYST BP >= 140 MM HG: CPT | Performed by: OBSTETRICS & GYNECOLOGY

## 2024-09-05 PROCEDURE — 99396 PREV VISIT EST AGE 40-64: CPT | Performed by: OBSTETRICS & GYNECOLOGY

## 2024-09-05 NOTE — PROGRESS NOTES
ANNUAL EXAM    Tracie Umana is a 48 y.o.  presenting for annual exam. Her main concerns today include       No chief complaint on file.      Ob/Gyn Hx:  A1  LMP - No LMP recorded. Patient is perimenopausal.  Menses - ***   Contraception - tubal ligation.  Hx of STI - {YES / NO:}    SA - {YES / NO:}      Health maintenance:  Last Pap: 2023 NILM/HPV neg  Last Mammogram: 2024 B1; Eulalia 17.86%  Last colonoscopy: ***    1. Have you been to the ER, urgent care clinic, or hospitalized since your last visit?{Yes when where and reason for visit:30913}    2. Have you seen or consulted any other health care providers outside of the [facility] since your last visit? {Yes when where and reason for visit:18522}    She {declines/accept:93942}  a chaperone during the gynecologic exam today.      Erika Nelson RN  
Chief Complaint   Patient presents with    Annual Exam     Patient arrived to room with no complaints.  She would like to discuss her menstrual cycle irregularity.  She states that she skipped her menstrual cycle with past  and July, but in August she had multiple shortened episodes of spotting or light vaginal bleeding.  She states she is currently spotting.      Ob/Gyn Hx:    LMP - Patient's last menstrual period was 2024 (exact date).  Menses - irregular and light   Contraception - none.  Hx of STI - denies testing today    SA - yes      Health maintenance:  Last Pap: 2023 WNL  Last Mammogram: 2024 BI-RADS 1  Last Bone Density: na  Last colonoscopy:  WNL per patient    1. Have you been to the ER, urgent care clinic, or hospitalized since your last visit?No    2. Have you seen or consulted any other health care providers outside of the [facility] since your last visit? No    She declines  a chaperone during the gynecologic exam today.      Leslie Osuna RN  
unanticipated grammatical, syntax, homophones, and other interpretive errors are inadvertently transcribed by the computer software.  Please disregard these errors.  Please excuse any errors that have escaped final proofreading.  Thank you.       Clara Zaldivar MD

## 2024-09-08 LAB
CYTOLOGIST CVX/VAG CYTO: NORMAL
CYTOLOGY CVX/VAG DOC CYTO: NORMAL
CYTOLOGY CVX/VAG DOC THIN PREP: NORMAL
DX ICD CODE: NORMAL
HPV GENOTYPE REFLEX: NORMAL
HPV I/H RISK 4 DNA CVX QL PROBE+SIG AMP: NEGATIVE
Lab: NORMAL
OTHER STN SPEC: NORMAL
STAT OF ADQ CVX/VAG CYTO-IMP: NORMAL

## 2024-09-23 ENCOUNTER — OFFICE VISIT (OUTPATIENT)
Age: 49
End: 2024-09-23

## 2024-09-23 VITALS
TEMPERATURE: 97.8 F | WEIGHT: 205 LBS | DIASTOLIC BLOOD PRESSURE: 80 MMHG | BODY MASS INDEX: 37.73 KG/M2 | HEART RATE: 90 BPM | OXYGEN SATURATION: 97 % | HEIGHT: 62 IN | SYSTOLIC BLOOD PRESSURE: 148 MMHG | RESPIRATION RATE: 20 BRPM

## 2024-09-23 DIAGNOSIS — E55.9 VITAMIN D DEFICIENCY: ICD-10-CM

## 2024-09-23 DIAGNOSIS — D50.0 IRON DEFICIENCY ANEMIA DUE TO CHRONIC BLOOD LOSS: ICD-10-CM

## 2024-09-23 DIAGNOSIS — Z23 IMMUNIZATION DUE: ICD-10-CM

## 2024-09-23 DIAGNOSIS — D50.9 HYPOCHROMIC MICROCYTIC ANEMIA: ICD-10-CM

## 2024-09-23 DIAGNOSIS — I10 HYPERTENSION, WELL CONTROLLED: ICD-10-CM

## 2024-09-23 DIAGNOSIS — I10 ESSENTIAL HYPERTENSION, BENIGN: ICD-10-CM

## 2024-09-23 DIAGNOSIS — E78.00 HYPERCHOLESTEROLEMIA: ICD-10-CM

## 2024-09-23 DIAGNOSIS — J30.89 ALLERGIC RHINITIS DUE TO OTHER ALLERGIC TRIGGER, UNSPECIFIED SEASONALITY: ICD-10-CM

## 2024-09-23 DIAGNOSIS — R73.03 BORDERLINE DIABETES MELLITUS: ICD-10-CM

## 2024-09-23 DIAGNOSIS — I10 ESSENTIAL HYPERTENSION, BENIGN: Primary | ICD-10-CM

## 2024-09-23 LAB
25(OH)D3 SERPL-MCNC: 31.2 NG/ML (ref 30–100)
ALBUMIN SERPL-MCNC: 3.6 G/DL (ref 3.5–5)
ALBUMIN/GLOB SERPL: 0.8 (ref 1.1–2.2)
ALP SERPL-CCNC: 99 U/L (ref 45–117)
ALT SERPL-CCNC: 19 U/L (ref 12–78)
ANION GAP SERPL CALC-SCNC: 5 MMOL/L (ref 2–12)
AST SERPL-CCNC: 10 U/L (ref 15–37)
BASOPHILS # BLD: 0 K/UL (ref 0–0.1)
BASOPHILS NFR BLD: 1 % (ref 0–1)
BILIRUB SERPL-MCNC: 0.4 MG/DL (ref 0.2–1)
BUN SERPL-MCNC: 23 MG/DL (ref 6–20)
BUN/CREAT SERPL: 16 (ref 12–20)
CALCIUM SERPL-MCNC: 9.7 MG/DL (ref 8.5–10.1)
CHLORIDE SERPL-SCNC: 102 MMOL/L (ref 97–108)
CHOLEST SERPL-MCNC: 216 MG/DL
CO2 SERPL-SCNC: 31 MMOL/L (ref 21–32)
CREAT SERPL-MCNC: 1.45 MG/DL (ref 0.55–1.02)
DIFFERENTIAL METHOD BLD: ABNORMAL
EOSINOPHIL # BLD: 0.5 K/UL (ref 0–0.4)
EOSINOPHIL NFR BLD: 6 % (ref 0–7)
ERYTHROCYTE [DISTWIDTH] IN BLOOD BY AUTOMATED COUNT: 14.3 % (ref 11.5–14.5)
EST. AVERAGE GLUCOSE BLD GHB EST-MCNC: 91 MG/DL
GLOBULIN SER CALC-MCNC: 4.4 G/DL (ref 2–4)
GLUCOSE SERPL-MCNC: 96 MG/DL (ref 65–100)
HBA1C MFR BLD: 4.8 % (ref 4–5.6)
HCT VFR BLD AUTO: 41.7 % (ref 35–47)
HDLC SERPL-MCNC: 89 MG/DL
HDLC SERPL: 2.4 (ref 0–5)
HGB BLD-MCNC: 12.7 G/DL (ref 11.5–16)
IMM GRANULOCYTES # BLD AUTO: 0 K/UL (ref 0–0.04)
IMM GRANULOCYTES NFR BLD AUTO: 0 % (ref 0–0.5)
LDLC SERPL CALC-MCNC: 113.4 MG/DL (ref 0–100)
LYMPHOCYTES # BLD: 1.4 K/UL (ref 0.8–3.5)
LYMPHOCYTES NFR BLD: 17 % (ref 12–49)
MCH RBC QN AUTO: 26.3 PG (ref 26–34)
MCHC RBC AUTO-ENTMCNC: 30.5 G/DL (ref 30–36.5)
MCV RBC AUTO: 86.3 FL (ref 80–99)
MONOCYTES # BLD: 0.6 K/UL (ref 0–1)
MONOCYTES NFR BLD: 7 % (ref 5–13)
NEUTS SEG # BLD: 5.7 K/UL (ref 1.8–8)
NEUTS SEG NFR BLD: 69 % (ref 32–75)
NRBC # BLD: 0 K/UL (ref 0–0.01)
NRBC BLD-RTO: 0 PER 100 WBC
PLATELET # BLD AUTO: 352 K/UL (ref 150–400)
PMV BLD AUTO: 11.4 FL (ref 8.9–12.9)
POTASSIUM SERPL-SCNC: 3.6 MMOL/L (ref 3.5–5.1)
PROT SERPL-MCNC: 8 G/DL (ref 6.4–8.2)
RBC # BLD AUTO: 4.83 M/UL (ref 3.8–5.2)
SODIUM SERPL-SCNC: 138 MMOL/L (ref 136–145)
TRIGL SERPL-MCNC: 68 MG/DL
VLDLC SERPL CALC-MCNC: 13.6 MG/DL
WBC # BLD AUTO: 8.2 K/UL (ref 3.6–11)

## 2024-09-23 RX ORDER — ACETAMINOPHEN 160 MG
2000 TABLET,DISINTEGRATING ORAL DAILY
Qty: 90 CAPSULE | Refills: 1 | Status: SHIPPED | OUTPATIENT
Start: 2024-09-23

## 2024-09-23 RX ORDER — HYDRALAZINE HYDROCHLORIDE 100 MG/1
100 TABLET, FILM COATED ORAL 2 TIMES DAILY
Qty: 60 TABLET | Refills: 3 | Status: SHIPPED | OUTPATIENT
Start: 2024-09-23

## 2024-09-23 RX ORDER — LISINOPRIL 40 MG/1
40 TABLET ORAL DAILY
Qty: 90 TABLET | Refills: 1 | Status: SHIPPED | OUTPATIENT
Start: 2024-09-23

## 2024-09-23 RX ORDER — FERROUS SULFATE 325(65) MG
325 TABLET ORAL
Qty: 30 TABLET | Refills: 3 | Status: SHIPPED | OUTPATIENT
Start: 2024-09-23

## 2024-09-23 RX ORDER — HYDROCHLOROTHIAZIDE 25 MG/1
25 TABLET ORAL EVERY MORNING
Qty: 90 TABLET | Refills: 1 | Status: SHIPPED | OUTPATIENT
Start: 2024-09-23

## 2024-09-23 RX ORDER — NIFEDIPINE 60 MG/1
60 TABLET, EXTENDED RELEASE ORAL DAILY
Qty: 90 TABLET | Refills: 1 | Status: SHIPPED | OUTPATIENT
Start: 2024-09-23

## 2024-09-23 SDOH — ECONOMIC STABILITY: INCOME INSECURITY: HOW HARD IS IT FOR YOU TO PAY FOR THE VERY BASICS LIKE FOOD, HOUSING, MEDICAL CARE, AND HEATING?: NOT HARD AT ALL

## 2024-09-23 SDOH — ECONOMIC STABILITY: FOOD INSECURITY: WITHIN THE PAST 12 MONTHS, THE FOOD YOU BOUGHT JUST DIDN'T LAST AND YOU DIDN'T HAVE MONEY TO GET MORE.: NEVER TRUE

## 2025-01-06 ENCOUNTER — OFFICE VISIT (OUTPATIENT)
Age: 50
End: 2025-01-06
Payer: COMMERCIAL

## 2025-01-06 VITALS
SYSTOLIC BLOOD PRESSURE: 130 MMHG | DIASTOLIC BLOOD PRESSURE: 74 MMHG | BODY MASS INDEX: 39.38 KG/M2 | RESPIRATION RATE: 20 BRPM | OXYGEN SATURATION: 98 % | HEART RATE: 82 BPM | HEIGHT: 62 IN | TEMPERATURE: 97.8 F | WEIGHT: 214 LBS

## 2025-01-06 DIAGNOSIS — E78.00 HYPERCHOLESTEROLEMIA: ICD-10-CM

## 2025-01-06 DIAGNOSIS — I10 HYPERTENSION, WELL CONTROLLED: Primary | ICD-10-CM

## 2025-01-06 DIAGNOSIS — J30.89 ALLERGIC RHINITIS DUE TO OTHER ALLERGIC TRIGGER, UNSPECIFIED SEASONALITY: ICD-10-CM

## 2025-01-06 PROBLEM — N94.6 DYSMENORRHEA: Status: ACTIVE | Noted: 2025-01-06

## 2025-01-06 PROBLEM — J06.9 UPPER RESPIRATORY INFECTION: Status: ACTIVE | Noted: 2019-03-18

## 2025-01-06 PROBLEM — N92.1 MENORRHAGIA WITH IRREGULAR CYCLE: Status: ACTIVE | Noted: 2025-01-06

## 2025-01-06 PROBLEM — R10.31 RIGHT LOWER QUADRANT PAIN: Status: ACTIVE | Noted: 2025-01-06

## 2025-01-06 PROCEDURE — 99214 OFFICE O/P EST MOD 30 MIN: CPT | Performed by: INTERNAL MEDICINE

## 2025-01-06 PROCEDURE — 3075F SYST BP GE 130 - 139MM HG: CPT | Performed by: INTERNAL MEDICINE

## 2025-01-06 PROCEDURE — 3078F DIAST BP <80 MM HG: CPT | Performed by: INTERNAL MEDICINE

## 2025-01-06 RX ORDER — NIFEDIPINE 60 MG/1
60 TABLET, EXTENDED RELEASE ORAL DAILY
Qty: 90 TABLET | Refills: 1 | Status: SHIPPED | OUTPATIENT
Start: 2025-01-06

## 2025-01-06 RX ORDER — ALBUTEROL SULFATE 90 UG/1
2 INHALANT RESPIRATORY (INHALATION) EVERY 4 HOURS PRN
Qty: 6.7 G | Refills: 5 | Status: SHIPPED | OUTPATIENT
Start: 2025-01-06

## 2025-01-06 RX ORDER — LISINOPRIL 40 MG/1
40 TABLET ORAL DAILY
Qty: 90 TABLET | Refills: 1 | Status: SHIPPED | OUTPATIENT
Start: 2025-01-06

## 2025-01-06 RX ORDER — HYDRALAZINE HYDROCHLORIDE 100 MG/1
100 TABLET, FILM COATED ORAL 2 TIMES DAILY
Qty: 60 TABLET | Refills: 3 | Status: SHIPPED | OUTPATIENT
Start: 2025-01-06

## 2025-01-06 RX ORDER — HYDROCHLOROTHIAZIDE 25 MG/1
25 TABLET ORAL EVERY MORNING
Qty: 90 TABLET | Refills: 1 | Status: SHIPPED | OUTPATIENT
Start: 2025-01-06

## 2025-01-06 ASSESSMENT — ANXIETY QUESTIONNAIRES
IF YOU CHECKED OFF ANY PROBLEMS ON THIS QUESTIONNAIRE, HOW DIFFICULT HAVE THESE PROBLEMS MADE IT FOR YOU TO DO YOUR WORK, TAKE CARE OF THINGS AT HOME, OR GET ALONG WITH OTHER PEOPLE: NOT DIFFICULT AT ALL
2. NOT BEING ABLE TO STOP OR CONTROL WORRYING: NOT AT ALL
6. BECOMING EASILY ANNOYED OR IRRITABLE: NOT AT ALL
3. WORRYING TOO MUCH ABOUT DIFFERENT THINGS: NOT AT ALL
5. BEING SO RESTLESS THAT IT IS HARD TO SIT STILL: NOT AT ALL
7. FEELING AFRAID AS IF SOMETHING AWFUL MIGHT HAPPEN: NOT AT ALL
4. TROUBLE RELAXING: NOT AT ALL
1. FEELING NERVOUS, ANXIOUS, OR ON EDGE: NOT AT ALL
GAD7 TOTAL SCORE: 0

## 2025-01-06 ASSESSMENT — PATIENT HEALTH QUESTIONNAIRE - PHQ9
1. LITTLE INTEREST OR PLEASURE IN DOING THINGS: NOT AT ALL
SUM OF ALL RESPONSES TO PHQ QUESTIONS 1-9: 0
2. FEELING DOWN, DEPRESSED OR HOPELESS: NOT AT ALL
SUM OF ALL RESPONSES TO PHQ QUESTIONS 1-9: 0
SUM OF ALL RESPONSES TO PHQ QUESTIONS 1-9: 0
SUM OF ALL RESPONSES TO PHQ9 QUESTIONS 1 & 2: 0
SUM OF ALL RESPONSES TO PHQ QUESTIONS 1-9: 0

## 2025-01-06 NOTE — PROGRESS NOTES
Chief Complaint   Patient presents with    4 month check up     Medication Refill     HPI:  Tracie Umana is a 49 y.o. female presents for 4 month check up.  Patient is doing well. Blood pressure is at goal. She is requesting medications refill.    Review of Systems  As per hpi    Past Medical History:   Diagnosis Date    Allergies - environmental     Anemia     Ankle sprain 03/2009    bilateral.  Dr. Georgi Pang childhood    Breast mass 5/5/09    Left.   benign.      Chickenpox childhood    Ectopic pregnancy 04/2010    Right.  Dr. Cristy Garcia    Essential hypertension, benign     Menorrhagia 08/2015    Dr. Stephanie Jenkins    Obesity     Ovarian cyst 2011    R.  Dr. Cristy Garcia    Pure hypercholesterolemia 05/2009    Thrombocytosis 05/2009    Tubal pregnancy 06/2011    Dr Cristy Garcia     Vitamin D deficiency 05/2009     Past Surgical History:   Procedure Laterality Date    COLONOSCOPY N/A 4/2/2024    COLONOSCOPY DIAGNOSTIC performed by Attila Marshall MD at [facility] ENDOSCOPY    DILATION AND CURETTAGE      DILATION AND CURETTAGE OF UTERUS  08/11/2015    Dr. Stephanie Jenkins.  due to heavy vag bleeding and thick endometrial stripe.    SALPINGO-OOPHORECTOMY Right 04/2010    Right.  due to ectopic pregnancy.  Dr. Cristy Garcia.    TUBAL LIGATION  2002    Dr. Stephanie Jenkins     Social History     Socioeconomic History    Marital status:      Spouse name: None    Number of children: None    Years of education: None    Highest education level: None   Tobacco Use    Smoking status: Never    Smokeless tobacco: Never   Vaping Use    Vaping status: Never Used   Substance and Sexual Activity    Alcohol use: No    Drug use: No    Sexual activity: Yes     Partners: Male     Birth control/protection: Surgical     Comment: TL     Social Determinants of Health     Financial Resource Strain: Low Risk  (9/23/2024)    Overall Financial Resource Strain (CARDIA)     Difficulty of Paying Living Expenses: Not hard at all

## 2025-01-20 DIAGNOSIS — E78.00 HYPERCHOLESTEROLEMIA: ICD-10-CM

## 2025-01-20 DIAGNOSIS — I10 HYPERTENSION, WELL CONTROLLED: ICD-10-CM

## 2025-01-20 LAB
ALBUMIN SERPL-MCNC: 3.5 G/DL (ref 3.5–5)
ALBUMIN/GLOB SERPL: 0.8 (ref 1.1–2.2)
ALP SERPL-CCNC: 174 U/L (ref 45–117)
ALT SERPL-CCNC: 393 U/L (ref 12–78)
ANION GAP SERPL CALC-SCNC: 6 MMOL/L (ref 2–12)
AST SERPL-CCNC: 384 U/L (ref 15–37)
BILIRUB SERPL-MCNC: 0.9 MG/DL (ref 0.2–1)
BUN SERPL-MCNC: 16 MG/DL (ref 6–20)
BUN/CREAT SERPL: 11 (ref 12–20)
CALCIUM SERPL-MCNC: 9.6 MG/DL (ref 8.5–10.1)
CHLORIDE SERPL-SCNC: 101 MMOL/L (ref 97–108)
CHOLEST SERPL-MCNC: 231 MG/DL
CO2 SERPL-SCNC: 27 MMOL/L (ref 21–32)
CREAT SERPL-MCNC: 1.41 MG/DL (ref 0.55–1.02)
GLOBULIN SER CALC-MCNC: 4.5 G/DL (ref 2–4)
GLUCOSE SERPL-MCNC: 98 MG/DL (ref 65–100)
HDLC SERPL-MCNC: 118 MG/DL
HDLC SERPL: 2 (ref 0–5)
LDLC SERPL CALC-MCNC: 102.6 MG/DL (ref 0–100)
POTASSIUM SERPL-SCNC: 3.9 MMOL/L (ref 3.5–5.1)
PROT SERPL-MCNC: 8 G/DL (ref 6.4–8.2)
SODIUM SERPL-SCNC: 134 MMOL/L (ref 136–145)
TRIGL SERPL-MCNC: 52 MG/DL
VLDLC SERPL CALC-MCNC: 10.4 MG/DL

## 2025-03-10 NOTE — PROGRESS NOTES
Problem Visit    Tracie Umana is a 49 y.o. presenting for problem visit. Her main concern today is irregular menses. Last cycle was in January was on day. Had a cycle in November that lasted a week.     Ob/Gyn Hx:  A1  LMP - Patient's last menstrual period was 01/10/2025 (exact date).  Menses  - irregular.   Contraception - tubal ligation  Hx of STI - denies  SA - yes     Health maintenance:  Last Pap: 2024 NILM/HPV neg  She does not have a history of ZACK 2, 3 or cervical cancer.     Past Medical History:   Diagnosis Date    Allergies - environmental     Anemia     Ankle sprain 2009    bilateral.  Dr. Laureano    Asthma childhood    Breast mass 09    Left.   benign.      Chickenpox childhood    Ectopic pregnancy 2010    Right.  Dr. Cristy Garcia    Essential hypertension, benign     Menorrhagia 2015    Dr. Stephanie Jenkins    Obesity     Ovarian cyst     R.  Dr. Cristy Garcia    Pure hypercholesterolemia 2009    Thrombocytosis 2009    Tubal pregnancy 2011    Dr Cristy Garcia     Vitamin D deficiency 2009       Past Surgical History:   Procedure Laterality Date    COLONOSCOPY N/A 2024    COLONOSCOPY DIAGNOSTIC performed by Attila Marshall MD at [facility] ENDOSCOPY    DILATION AND CURETTAGE      DILATION AND CURETTAGE OF UTERUS  2015    Dr. Stephanie Jenkins.  due to heavy vag bleeding and thick endometrial stripe.    SALPINGO-OOPHORECTOMY Right 2010    Right.  due to ectopic pregnancy.  Dr. Cristy Garcia.    TUBAL LIGATION      Dr. Stephanie Jenkins       Family History   Problem Relation Age of Onset    Diabetes Father         type ll    Breast Cancer Sister 65    Breast Cancer Maternal Aunt 72    Anesth Problems Neg Hx        Social History     Socioeconomic History    Marital status:      Spouse name: Not on file    Number of children: Not on file    Years of education: Not on file    Highest education level: Not on file   Occupational History    Not on file

## 2025-03-11 ENCOUNTER — OFFICE VISIT (OUTPATIENT)
Age: 50
End: 2025-03-11
Payer: COMMERCIAL

## 2025-03-11 VITALS
HEIGHT: 62 IN | WEIGHT: 218.6 LBS | DIASTOLIC BLOOD PRESSURE: 86 MMHG | BODY MASS INDEX: 40.23 KG/M2 | RESPIRATION RATE: 16 BRPM | TEMPERATURE: 97.9 F | SYSTOLIC BLOOD PRESSURE: 142 MMHG | HEART RATE: 87 BPM | OXYGEN SATURATION: 98 %

## 2025-03-11 DIAGNOSIS — N92.6 IRREGULAR MENSES: Primary | ICD-10-CM

## 2025-03-11 PROCEDURE — 99213 OFFICE O/P EST LOW 20 MIN: CPT | Performed by: OBSTETRICS & GYNECOLOGY

## 2025-03-11 PROCEDURE — 3077F SYST BP >= 140 MM HG: CPT | Performed by: OBSTETRICS & GYNECOLOGY

## 2025-03-11 PROCEDURE — 3079F DIAST BP 80-89 MM HG: CPT | Performed by: OBSTETRICS & GYNECOLOGY

## 2025-03-11 SDOH — ECONOMIC STABILITY: FOOD INSECURITY: WITHIN THE PAST 12 MONTHS, YOU WORRIED THAT YOUR FOOD WOULD RUN OUT BEFORE YOU GOT MONEY TO BUY MORE.: PATIENT DECLINED

## 2025-03-11 SDOH — ECONOMIC STABILITY: TRANSPORTATION INSECURITY
IN THE PAST 12 MONTHS, HAS THE LACK OF TRANSPORTATION KEPT YOU FROM MEDICAL APPOINTMENTS OR FROM GETTING MEDICATIONS?: NO

## 2025-03-11 SDOH — ECONOMIC STABILITY: FOOD INSECURITY: WITHIN THE PAST 12 MONTHS, THE FOOD YOU BOUGHT JUST DIDN'T LAST AND YOU DIDN'T HAVE MONEY TO GET MORE.: OFTEN TRUE

## 2025-03-11 SDOH — ECONOMIC STABILITY: INCOME INSECURITY: IN THE LAST 12 MONTHS, WAS THERE A TIME WHEN YOU WERE NOT ABLE TO PAY THE MORTGAGE OR RENT ON TIME?: NO

## 2025-03-11 NOTE — PROGRESS NOTES
Tracie Umana is a 49 y.o. female presents for a problem visit.    Chief Complaint   Patient presents with    Follow-up    Vaginal Bleeding   Notes her last cycle was in January that last 1 day. She reports a normal length cycle in November.     Ob/Gyn Hx:  A1  LMP - Patient's last menstrual period was 01/10/2025 (exact date).  Menses  - irregular.   Contraception - tubal ligation  Hx of STI - denies  SA - yes    Health maintenance:  Last Pap: 2024 NILM/HPV neg  She does not have a history of ZACK 2, 3 or cervical cancer.     1. Have you been to the ER, urgent care clinic, or hospitalized since your last visit? No    2. Have you seen or consulted any other health care providers outside of the [facility] since your last visit? Yes - primary care    She declines  a chaperone during the gynecologic exam today.

## 2025-04-10 DIAGNOSIS — J30.89 ALLERGIC RHINITIS DUE TO OTHER ALLERGIC TRIGGER, UNSPECIFIED SEASONALITY: ICD-10-CM

## 2025-04-10 NOTE — TELEPHONE ENCOUNTER
Last appointment: 1/6/25  Next appointment: 5/6/25  Previous refill encounter(s): 1/6/25 #1 with 5 refills    Requested Prescriptions     Pending Prescriptions Disp Refills    albuterol sulfate HFA (PROVENTIL;VENTOLIN;PROAIR) 108 (90 Base) MCG/ACT inhaler [Pharmacy Med Name: ALBUTEROL HFA INH (200 PUFFS) 6.7GM] 6.7 g 5     Sig: INHALE 2 PUFFS BY MOUTH EVERY 4 HOURS AS NEEDED FOR WHEEZING         For Pharmacy Admin Tracking Only    Program: Medication Refill  CPA in place:    Recommendation Provided To:   Intervention Detail: New Rx: 1, reason: Patient Preference  Intervention Accepted By:   Gap Closed?:    Time Spent (min): 5

## 2025-04-11 RX ORDER — ALBUTEROL SULFATE 90 UG/1
2 INHALANT RESPIRATORY (INHALATION) EVERY 4 HOURS PRN
Qty: 6.7 G | Refills: 5 | Status: SHIPPED | OUTPATIENT
Start: 2025-04-11

## 2025-06-04 ENCOUNTER — OFFICE VISIT (OUTPATIENT)
Age: 50
End: 2025-06-04
Payer: COMMERCIAL

## 2025-06-04 VITALS
OXYGEN SATURATION: 99 % | DIASTOLIC BLOOD PRESSURE: 80 MMHG | HEIGHT: 62 IN | RESPIRATION RATE: 16 BRPM | HEART RATE: 93 BPM | BODY MASS INDEX: 40.74 KG/M2 | WEIGHT: 221.4 LBS | SYSTOLIC BLOOD PRESSURE: 140 MMHG | TEMPERATURE: 97.8 F

## 2025-06-04 DIAGNOSIS — I10 HYPERTENSION, WELL CONTROLLED: Primary | ICD-10-CM

## 2025-06-04 DIAGNOSIS — E78.00 HYPERCHOLESTEROLEMIA: ICD-10-CM

## 2025-06-04 DIAGNOSIS — I10 ESSENTIAL HYPERTENSION, BENIGN: ICD-10-CM

## 2025-06-04 DIAGNOSIS — E55.9 VITAMIN D DEFICIENCY: ICD-10-CM

## 2025-06-04 DIAGNOSIS — R05.3 PERSISTENT DRY COUGH: ICD-10-CM

## 2025-06-04 DIAGNOSIS — J30.89 ALLERGIC RHINITIS DUE TO OTHER ALLERGIC TRIGGER, UNSPECIFIED SEASONALITY: ICD-10-CM

## 2025-06-04 DIAGNOSIS — R79.89 ELEVATED LFTS: ICD-10-CM

## 2025-06-04 LAB
ALBUMIN SERPL-MCNC: 3.3 G/DL (ref 3.5–5)
ALBUMIN/GLOB SERPL: 0.8 (ref 1.1–2.2)
ALP SERPL-CCNC: 102 U/L (ref 45–117)
ALT SERPL-CCNC: 25 U/L (ref 12–78)
ANION GAP SERPL CALC-SCNC: 7 MMOL/L (ref 2–12)
AST SERPL-CCNC: 17 U/L (ref 15–37)
BASOPHILS # BLD: 0.03 K/UL (ref 0–0.1)
BASOPHILS NFR BLD: 0.3 % (ref 0–1)
BILIRUB SERPL-MCNC: 0.3 MG/DL (ref 0.2–1)
BUN SERPL-MCNC: 14 MG/DL (ref 6–20)
BUN/CREAT SERPL: 12 (ref 12–20)
CALCIUM SERPL-MCNC: 9.4 MG/DL (ref 8.5–10.1)
CHLORIDE SERPL-SCNC: 102 MMOL/L (ref 97–108)
CHOLEST SERPL-MCNC: 187 MG/DL
CO2 SERPL-SCNC: 27 MMOL/L (ref 21–32)
CREAT SERPL-MCNC: 1.21 MG/DL (ref 0.55–1.02)
DIFFERENTIAL METHOD BLD: ABNORMAL
EOSINOPHIL # BLD: 0.4 K/UL (ref 0–0.4)
EOSINOPHIL NFR BLD: 3.5 % (ref 0–7)
ERYTHROCYTE [DISTWIDTH] IN BLOOD BY AUTOMATED COUNT: 17.2 % (ref 11.5–14.5)
GLOBULIN SER CALC-MCNC: 4.3 G/DL (ref 2–4)
GLUCOSE SERPL-MCNC: 104 MG/DL (ref 65–100)
HCT VFR BLD AUTO: 35.9 % (ref 35–47)
HDLC SERPL-MCNC: 90 MG/DL
HDLC SERPL: 2.1 (ref 0–5)
HGB BLD-MCNC: 10.1 G/DL (ref 11.5–16)
IMM GRANULOCYTES # BLD AUTO: 0.05 K/UL (ref 0–0.04)
IMM GRANULOCYTES NFR BLD AUTO: 0.4 % (ref 0–0.5)
LDLC SERPL CALC-MCNC: 80.8 MG/DL (ref 0–100)
LYMPHOCYTES # BLD: 1.53 K/UL (ref 0.8–3.5)
LYMPHOCYTES NFR BLD: 13.3 % (ref 12–49)
MCH RBC QN AUTO: 23.2 PG (ref 26–34)
MCHC RBC AUTO-ENTMCNC: 28.1 G/DL (ref 30–36.5)
MCV RBC AUTO: 82.5 FL (ref 80–99)
MONOCYTES # BLD: 0.99 K/UL (ref 0–1)
MONOCYTES NFR BLD: 8.6 % (ref 5–13)
NEUTS SEG # BLD: 8.5 K/UL (ref 1.8–8)
NEUTS SEG NFR BLD: 73.9 % (ref 32–75)
NRBC # BLD: 0 K/UL (ref 0–0.01)
NRBC BLD-RTO: 0 PER 100 WBC
PLATELET # BLD AUTO: 403 K/UL (ref 150–400)
PMV BLD AUTO: 11.2 FL (ref 8.9–12.9)
POTASSIUM SERPL-SCNC: 3.8 MMOL/L (ref 3.5–5.1)
PROT SERPL-MCNC: 7.6 G/DL (ref 6.4–8.2)
RBC # BLD AUTO: 4.35 M/UL (ref 3.8–5.2)
RBC MORPH BLD: ABNORMAL
SODIUM SERPL-SCNC: 136 MMOL/L (ref 136–145)
TRIGL SERPL-MCNC: 81 MG/DL
VLDLC SERPL CALC-MCNC: 16.2 MG/DL
WBC # BLD AUTO: 11.5 K/UL (ref 3.6–11)

## 2025-06-04 PROCEDURE — 3077F SYST BP >= 140 MM HG: CPT | Performed by: INTERNAL MEDICINE

## 2025-06-04 PROCEDURE — 99214 OFFICE O/P EST MOD 30 MIN: CPT | Performed by: INTERNAL MEDICINE

## 2025-06-04 PROCEDURE — 3078F DIAST BP <80 MM HG: CPT | Performed by: INTERNAL MEDICINE

## 2025-06-04 RX ORDER — LORATADINE 10 MG/1
10 TABLET ORAL DAILY
Qty: 90 TABLET | Refills: 1 | Status: SHIPPED | OUTPATIENT
Start: 2025-06-04

## 2025-06-04 RX ORDER — ACETAMINOPHEN 160 MG
2000 TABLET,DISINTEGRATING ORAL DAILY
Qty: 90 CAPSULE | Refills: 1 | Status: SHIPPED | OUTPATIENT
Start: 2025-06-04

## 2025-06-04 ASSESSMENT — PATIENT HEALTH QUESTIONNAIRE - PHQ9
SUM OF ALL RESPONSES TO PHQ QUESTIONS 1-9: 0
SUM OF ALL RESPONSES TO PHQ QUESTIONS 1-9: 0
2. FEELING DOWN, DEPRESSED OR HOPELESS: NOT AT ALL
1. LITTLE INTEREST OR PLEASURE IN DOING THINGS: NOT AT ALL
SUM OF ALL RESPONSES TO PHQ QUESTIONS 1-9: 0
SUM OF ALL RESPONSES TO PHQ QUESTIONS 1-9: 0

## 2025-06-04 NOTE — PROGRESS NOTES
Chief Complaint   Patient presents with    Follow-up    Cough     HPI:  Tracie Umana is a 49 y.o. female presents for routine follow-up.  Patient c/o dry hacking cough ongoing for few months. She has received antibiotic tx,  Cough is non productive with wheezing, sob, fever or chills and worse at night.  Note that she has been lisinopril for many years for hypertension.   Advised to stop lisinopril and return to clinic in 2 weeks.     Review of Systems  As per hpi    Past Medical History:   Diagnosis Date    Allergies - environmental     Anemia     Ankle sprain 03/2009    bilateral.  Dr. Laureano    Asthma childhood    Breast mass 5/5/09    Left.   benign.      Chickenpox childhood    Ectopic pregnancy 04/2010    Right.  Dr. Cristy Garcia    Essential hypertension, benign     Menorrhagia 08/2015    Dr. Stephanie Jenkins    Obesity     Ovarian cyst 2011    R.  Dr. Cristy Garcia    Pure hypercholesterolemia 05/2009    Thrombocytosis 05/2009    Tubal pregnancy 06/2011    Dr Cristy Garcia     Vitamin D deficiency 05/2009     Past Surgical History:   Procedure Laterality Date    COLONOSCOPY N/A 4/2/2024    COLONOSCOPY DIAGNOSTIC performed by Attila Marshall MD at [facility] ENDOSCOPY    DILATION AND CURETTAGE      DILATION AND CURETTAGE OF UTERUS  08/11/2015    Dr. Stephanie Jenkins.  due to heavy vag bleeding and thick endometrial stripe.    SALPINGO-OOPHORECTOMY Right 04/2010    Right.  due to ectopic pregnancy.  Dr. Cristy Garcia.    TUBAL LIGATION  2002    Dr. Stephanie Jenkins     Social History     Socioeconomic History    Marital status:      Spouse name: None    Number of children: None    Years of education: None    Highest education level: None   Tobacco Use    Smoking status: Never    Smokeless tobacco: Never   Vaping Use    Vaping status: Never Used   Substance and Sexual Activity    Alcohol use: No    Drug use: No    Sexual activity: Yes     Partners: Male     Birth control/protection: Surgical     Comment: TL

## 2025-06-04 NOTE — PROGRESS NOTES
Chief Complaint   Patient presents with    Follow-up    Cough       \"Have you been to the ER, urgent care clinic since your last visit?  Hospitalized since your last visit?\"    NO    “Have you seen or consulted any other health care providers outside of [facility] since your last visit?”    NO            Click Here for Release of Records Request       There were no vitals filed for this visit.   Health Maintenance Due   Topic Date Due    Hepatitis B vaccine (1 of 3 - 19+ 3-dose series) Never done    DTaP/Tdap/Td vaccine (1 - Tdap) Never done    Pneumococcal 50+ years Vaccine (1 of 2 - PCV) Never done    Shingles vaccine (1 of 2) Never done    COVID-19 Vaccine (2024- season) 2024    Breast cancer screen  2025        The patient, Tracie Umana, identity was verified by name and .

## 2025-06-09 RX ORDER — HYDRALAZINE HYDROCHLORIDE 100 MG/1
100 TABLET, FILM COATED ORAL 2 TIMES DAILY
Qty: 60 TABLET | Refills: 3 | Status: SHIPPED | OUTPATIENT
Start: 2025-06-09

## 2025-06-09 RX ORDER — HYDROCHLOROTHIAZIDE 25 MG/1
25 TABLET ORAL EVERY MORNING
Qty: 90 TABLET | Refills: 1 | Status: SHIPPED | OUTPATIENT
Start: 2025-06-09

## 2025-06-09 RX ORDER — NIFEDIPINE 60 MG/1
60 TABLET, EXTENDED RELEASE ORAL DAILY
Qty: 90 TABLET | Refills: 1 | Status: SHIPPED | OUTPATIENT
Start: 2025-06-09

## 2025-07-02 ENCOUNTER — HOSPITAL ENCOUNTER (OUTPATIENT)
Facility: HOSPITAL | Age: 50
Discharge: HOME | End: 2025-07-05
Payer: COMMERCIAL

## 2025-07-02 DIAGNOSIS — Z12.31 VISIT FOR SCREENING MAMMOGRAM: ICD-10-CM

## 2025-07-02 PROCEDURE — 77063 BREAST TOMOSYNTHESIS BI: CPT

## 2025-07-09 ENCOUNTER — RESULTS FOLLOW-UP (OUTPATIENT)
Age: 50
End: 2025-07-09

## 2025-07-09 ENCOUNTER — OFFICE VISIT (OUTPATIENT)
Age: 50
End: 2025-07-09
Payer: COMMERCIAL

## 2025-07-09 VITALS
TEMPERATURE: 97.7 F | OXYGEN SATURATION: 95 % | DIASTOLIC BLOOD PRESSURE: 67 MMHG | BODY MASS INDEX: 39.45 KG/M2 | SYSTOLIC BLOOD PRESSURE: 129 MMHG | RESPIRATION RATE: 16 BRPM | HEART RATE: 93 BPM | WEIGHT: 214.4 LBS | HEIGHT: 62 IN

## 2025-07-09 DIAGNOSIS — N18.31 CHRONIC RENAL FAILURE, STAGE 3A (HCC): ICD-10-CM

## 2025-07-09 DIAGNOSIS — I10 HYPERTENSION, WELL CONTROLLED: Primary | ICD-10-CM

## 2025-07-09 DIAGNOSIS — D64.9 NORMOCHROMIC NORMOCYTIC ANEMIA: ICD-10-CM

## 2025-07-09 PROCEDURE — 3078F DIAST BP <80 MM HG: CPT | Performed by: INTERNAL MEDICINE

## 2025-07-09 PROCEDURE — 3074F SYST BP LT 130 MM HG: CPT | Performed by: INTERNAL MEDICINE

## 2025-07-09 PROCEDURE — 99214 OFFICE O/P EST MOD 30 MIN: CPT | Performed by: INTERNAL MEDICINE

## 2025-07-09 RX ORDER — NIFEDIPINE 60 MG/1
60 TABLET, EXTENDED RELEASE ORAL DAILY
Qty: 90 TABLET | Refills: 1 | Status: SHIPPED | OUTPATIENT
Start: 2025-07-09

## 2025-07-09 NOTE — PROGRESS NOTES
Chief Complaint   Patient presents with    Follow-up       \"Have you been to the ER, urgent care clinic since your last visit?  Hospitalized since your last visit?\"    NO    “Have you seen or consulted any other health care providers outside of [facility] since your last visit?”    NO            Click Here for Release of Records Request       There were no vitals filed for this visit.   Health Maintenance Due   Topic Date Due    Hepatitis B vaccine (1 of 3 - 19+ 3-dose series) Never done    DTaP/Tdap/Td vaccine (1 - Tdap) Never done    Pneumococcal 50+ years Vaccine (1 of 2 - PCV) Never done    Shingles vaccine (1 of 2) Never done    COVID-19 Vaccine (2024- season) 2024        The patient, Tracie Umana, identity was verified by name and .

## 2025-08-24 DIAGNOSIS — J30.89 ALLERGIC RHINITIS DUE TO OTHER ALLERGIC TRIGGER, UNSPECIFIED SEASONALITY: ICD-10-CM

## 2025-08-25 RX ORDER — ALBUTEROL SULFATE 90 UG/1
2 INHALANT RESPIRATORY (INHALATION) EVERY 4 HOURS PRN
Qty: 6.7 G | Refills: 5 | Status: SHIPPED | OUTPATIENT
Start: 2025-08-25

## 2025-09-02 ENCOUNTER — OFFICE VISIT (OUTPATIENT)
Age: 50
End: 2025-09-02
Payer: COMMERCIAL

## 2025-09-02 VITALS
DIASTOLIC BLOOD PRESSURE: 82 MMHG | TEMPERATURE: 98.1 F | HEART RATE: 93 BPM | WEIGHT: 214.8 LBS | HEIGHT: 62 IN | OXYGEN SATURATION: 99 % | SYSTOLIC BLOOD PRESSURE: 132 MMHG | RESPIRATION RATE: 16 BRPM | BODY MASS INDEX: 39.53 KG/M2

## 2025-09-02 DIAGNOSIS — D50.8 OTHER IRON DEFICIENCY ANEMIA: ICD-10-CM

## 2025-09-02 DIAGNOSIS — N92.6 IRREGULAR MENSES: Primary | ICD-10-CM

## 2025-09-02 DIAGNOSIS — N83.299 COMPLEX OVARIAN CYST: ICD-10-CM

## 2025-09-02 PROCEDURE — 3075F SYST BP GE 130 - 139MM HG: CPT | Performed by: OBSTETRICS & GYNECOLOGY

## 2025-09-02 PROCEDURE — 3079F DIAST BP 80-89 MM HG: CPT | Performed by: OBSTETRICS & GYNECOLOGY

## 2025-09-02 PROCEDURE — 99214 OFFICE O/P EST MOD 30 MIN: CPT | Performed by: OBSTETRICS & GYNECOLOGY

## (undated) DEVICE — NDL SPNE QNCKE 22GX3.5IN LF --

## (undated) DEVICE — BASIC SINGLE BASIN BTC-LF: Brand: MEDLINE INDUSTRIES, INC.

## (undated) DEVICE — COVER,TABLE,60X90,STERILE: Brand: MEDLINE

## (undated) DEVICE — PREMIUM WET SKIN PREP TRAY: Brand: MEDLINE INDUSTRIES, INC.

## (undated) DEVICE — GAUZE SPNG XRAY 4X4IN 16PLY -- STRL

## (undated) DEVICE — GLOVE SURG SZ 6 THK91MIL LTX FREE SYN POLYISOPRENE ANTI

## (undated) DEVICE — SOLUTION IRRIG 3000ML 0.9% SOD CHL USP UROMATIC PLAS CONT

## (undated) DEVICE — GOWN,SIRUS,NONRNF,SETINSLV,XL,20/CS: Brand: MEDLINE

## (undated) DEVICE — (D)DEVICE TISS RETRV 4MMX25.25 -- DISC BY MFR USE ITEM 335978

## (undated) DEVICE — CATHETER,URETHRAL,REDRUBBER,STRL,16FR: Brand: MEDLINE

## (undated) DEVICE — MARKER,SKIN,WI/RULER AND LABELS: Brand: MEDLINE

## (undated) DEVICE — DRAPE,LITHOTOMY,STERILE: Brand: MEDLINE

## (undated) DEVICE — PAD,SANITARY,11 IN,MAXI,N-STRL,IND WRAP: Brand: MEDLINE

## (undated) DEVICE — SHEET,DRAPE,UNDERBUTTOCK,GRAD POUCH,PORT: Brand: MEDLINE

## (undated) DEVICE — HANDLE LT SNAP ON ULT DURABLE LENS FOR TRUMPF ALC DISPOSABLE

## (undated) DEVICE — FLUID MGMT SYS FLUENT KIT 6/PK

## (undated) DEVICE — SET SEALS HYSTEROSCOPE DISP -- MYOSURE  EA=10